# Patient Record
Sex: FEMALE | Race: WHITE | NOT HISPANIC OR LATINO
[De-identification: names, ages, dates, MRNs, and addresses within clinical notes are randomized per-mention and may not be internally consistent; named-entity substitution may affect disease eponyms.]

---

## 2017-05-09 ENCOUNTER — APPOINTMENT (OUTPATIENT)
Age: 52
End: 2017-05-09

## 2017-05-09 VITALS
BODY MASS INDEX: 25.21 KG/M2 | SYSTOLIC BLOOD PRESSURE: 117 MMHG | RESPIRATION RATE: 17 BRPM | HEART RATE: 67 BPM | DIASTOLIC BLOOD PRESSURE: 82 MMHG | HEIGHT: 62 IN | TEMPERATURE: 97.9 F | WEIGHT: 137 LBS

## 2017-05-11 LAB
AFP-TM SERPL-MCNC: 1 NG/ML
ALBUMIN SERPL ELPH-MCNC: 3.8 G/DL
ALP BLD-CCNC: 46 U/L
ALT SERPL-CCNC: 17 U/L
ANION GAP SERPL CALC-SCNC: 14 MMOL/L
AST SERPL-CCNC: 20 U/L
BASOPHILS # BLD AUTO: 0.04 K/UL
BASOPHILS NFR BLD AUTO: 0.7 %
BILIRUB SERPL-MCNC: 0.3 MG/DL
BUN SERPL-MCNC: 13 MG/DL
CALCIUM SERPL-MCNC: 9.4 MG/DL
CHLORIDE SERPL-SCNC: 101 MMOL/L
CO2 SERPL-SCNC: 24 MMOL/L
CREAT SERPL-MCNC: 0.82 MG/DL
EOSINOPHIL # BLD AUTO: 0.16 K/UL
EOSINOPHIL NFR BLD AUTO: 2.7 %
HBV SURFACE AB SERPL IA-ACNC: <3 MIU/ML
HCT VFR BLD CALC: 40.6 %
HGB BLD-MCNC: 13.7 G/DL
IMM GRANULOCYTES NFR BLD AUTO: 0.2 %
LYMPHOCYTES # BLD AUTO: 2.17 K/UL
LYMPHOCYTES NFR BLD AUTO: 36.9 %
MAN DIFF?: NORMAL
MCHC RBC-ENTMCNC: 30.7 PG
MCHC RBC-ENTMCNC: 33.7 GM/DL
MCV RBC AUTO: 91 FL
MONOCYTES # BLD AUTO: 0.49 K/UL
MONOCYTES NFR BLD AUTO: 8.3 %
NEUTROPHILS # BLD AUTO: 3.01 K/UL
NEUTROPHILS NFR BLD AUTO: 51.2 %
PLATELET # BLD AUTO: 155 K/UL
POTASSIUM SERPL-SCNC: 4 MMOL/L
PROT SERPL-MCNC: 6.8 G/DL
RBC # BLD: 4.46 M/UL
RBC # FLD: 13.6 %
SODIUM SERPL-SCNC: 139 MMOL/L
WBC # FLD AUTO: 5.88 K/UL

## 2017-07-11 ENCOUNTER — APPOINTMENT (OUTPATIENT)
Dept: ULTRASOUND IMAGING | Facility: IMAGING CENTER | Age: 52
End: 2017-07-11

## 2017-07-11 ENCOUNTER — OUTPATIENT (OUTPATIENT)
Dept: OUTPATIENT SERVICES | Facility: HOSPITAL | Age: 52
LOS: 1 days | End: 2017-07-11
Payer: COMMERCIAL

## 2017-07-11 ENCOUNTER — APPOINTMENT (OUTPATIENT)
Age: 52
End: 2017-07-11

## 2017-07-11 DIAGNOSIS — K74.60 UNSPECIFIED CIRRHOSIS OF LIVER: ICD-10-CM

## 2017-07-11 PROCEDURE — 76700 US EXAM ABDOM COMPLETE: CPT

## 2017-07-14 ENCOUNTER — OUTPATIENT (OUTPATIENT)
Dept: OUTPATIENT SERVICES | Facility: HOSPITAL | Age: 52
LOS: 1 days | End: 2017-07-14
Payer: COMMERCIAL

## 2017-07-14 ENCOUNTER — APPOINTMENT (OUTPATIENT)
Age: 52
End: 2017-07-14

## 2017-07-14 DIAGNOSIS — B19.20 UNSPECIFIED VIRAL HEPATITIS C WITHOUT HEPATIC COMA: ICD-10-CM

## 2017-07-14 PROCEDURE — G0121: CPT

## 2017-07-14 PROCEDURE — 45378 DIAGNOSTIC COLONOSCOPY: CPT | Mod: GC

## 2017-08-08 ENCOUNTER — APPOINTMENT (OUTPATIENT)
Age: 52
End: 2017-08-08
Payer: COMMERCIAL

## 2017-08-08 PROCEDURE — 90471 IMMUNIZATION ADMIN: CPT

## 2017-08-08 PROCEDURE — 90740 HEPB VACC 3 DOSE IMMUNSUP IM: CPT

## 2017-09-12 ENCOUNTER — APPOINTMENT (OUTPATIENT)
Age: 52
End: 2017-09-12
Payer: COMMERCIAL

## 2017-09-12 PROCEDURE — 90740 HEPB VACC 3 DOSE IMMUNSUP IM: CPT

## 2017-09-12 PROCEDURE — 90471 IMMUNIZATION ADMIN: CPT

## 2017-11-10 ENCOUNTER — APPOINTMENT (OUTPATIENT)
Age: 52
End: 2017-11-10
Payer: COMMERCIAL

## 2017-11-10 VITALS
BODY MASS INDEX: 25.21 KG/M2 | RESPIRATION RATE: 18 BRPM | WEIGHT: 137 LBS | HEIGHT: 62 IN | DIASTOLIC BLOOD PRESSURE: 77 MMHG | HEART RATE: 65 BPM | TEMPERATURE: 97.7 F | SYSTOLIC BLOOD PRESSURE: 111 MMHG

## 2017-11-10 LAB
BASOPHILS # BLD AUTO: 0.04 K/UL
BASOPHILS NFR BLD AUTO: 0.7 %
EOSINOPHIL # BLD AUTO: 0.15 K/UL
EOSINOPHIL NFR BLD AUTO: 2.5 %
HCT VFR BLD CALC: 44.9 %
HGB BLD-MCNC: 14.6 G/DL
IMM GRANULOCYTES NFR BLD AUTO: 0.3 %
INR PPP: 1.04 RATIO
LYMPHOCYTES # BLD AUTO: 2.28 K/UL
LYMPHOCYTES NFR BLD AUTO: 38.7 %
MAN DIFF?: NORMAL
MCHC RBC-ENTMCNC: 30.3 PG
MCHC RBC-ENTMCNC: 32.5 GM/DL
MCV RBC AUTO: 93.2 FL
MONOCYTES # BLD AUTO: 0.48 K/UL
MONOCYTES NFR BLD AUTO: 8.1 %
NEUTROPHILS # BLD AUTO: 2.92 K/UL
NEUTROPHILS NFR BLD AUTO: 49.7 %
PLATELET # BLD AUTO: 173 K/UL
PT BLD: 11.8 SEC
RBC # BLD: 4.82 M/UL
RBC # FLD: 13.8 %
WBC # FLD AUTO: 5.89 K/UL

## 2017-11-10 PROCEDURE — 99213 OFFICE O/P EST LOW 20 MIN: CPT

## 2017-11-10 RX ORDER — MUPIROCIN 20 MG/G
2 OINTMENT TOPICAL
Qty: 22 | Refills: 0 | Status: COMPLETED | COMMUNITY
Start: 2017-07-07

## 2017-11-10 RX ORDER — SODIUM SULFATE, POTASSIUM SULFATE, MAGNESIUM SULFATE 17.5; 3.13; 1.6 G/ML; G/ML; G/ML
17.5-3.13-1.6 SOLUTION, CONCENTRATE ORAL
Qty: 1 | Refills: 0 | Status: DISCONTINUED | COMMUNITY
Start: 2017-05-09 | End: 2017-11-10

## 2017-11-13 LAB
AFP-TM SERPL-MCNC: 0.9 NG/ML
ALBUMIN SERPL ELPH-MCNC: 4.3 G/DL
ALP BLD-CCNC: 44 U/L
ALT SERPL-CCNC: 17 U/L
ANION GAP SERPL CALC-SCNC: 14 MMOL/L
AST SERPL-CCNC: 31 U/L
BILIRUB SERPL-MCNC: 0.5 MG/DL
BUN SERPL-MCNC: 14 MG/DL
CALCIUM SERPL-MCNC: 9.5 MG/DL
CHLORIDE SERPL-SCNC: 101 MMOL/L
CO2 SERPL-SCNC: 25 MMOL/L
CREAT SERPL-MCNC: 0.73 MG/DL
POTASSIUM SERPL-SCNC: 4.6 MMOL/L
PROT SERPL-MCNC: 7.4 G/DL
SODIUM SERPL-SCNC: 140 MMOL/L

## 2017-11-14 LAB
HCV RNA SERPL NAA DL=5-ACNC: NOT DETECTED
HCV RNA SERPL NAA+PROBE-LOG IU: NOT DETECTED LOGIU/ML

## 2017-11-22 ENCOUNTER — OUTPATIENT (OUTPATIENT)
Dept: OUTPATIENT SERVICES | Facility: HOSPITAL | Age: 52
LOS: 1 days | End: 2017-11-22
Payer: COMMERCIAL

## 2017-11-22 ENCOUNTER — APPOINTMENT (OUTPATIENT)
Dept: SURGERY | Facility: CLINIC | Age: 52
End: 2017-11-22
Payer: COMMERCIAL

## 2017-11-22 ENCOUNTER — APPOINTMENT (OUTPATIENT)
Dept: MRI IMAGING | Facility: IMAGING CENTER | Age: 52
End: 2017-11-22
Payer: COMMERCIAL

## 2017-11-22 DIAGNOSIS — Z00.8 ENCOUNTER FOR OTHER GENERAL EXAMINATION: ICD-10-CM

## 2017-11-22 PROCEDURE — 0159T: CPT | Mod: 26

## 2017-11-22 PROCEDURE — C8908: CPT

## 2017-11-22 PROCEDURE — A9585: CPT

## 2017-11-22 PROCEDURE — C8937: CPT

## 2017-11-22 PROCEDURE — 99205K: CUSTOM

## 2017-11-22 PROCEDURE — 77059 MRI BREAST BILATERAL: CPT | Mod: 26

## 2017-12-01 ENCOUNTER — RESULT REVIEW (OUTPATIENT)
Age: 52
End: 2017-12-01

## 2017-12-01 ENCOUNTER — OUTPATIENT (OUTPATIENT)
Dept: OUTPATIENT SERVICES | Facility: HOSPITAL | Age: 52
LOS: 1 days | End: 2017-12-01
Payer: COMMERCIAL

## 2017-12-01 ENCOUNTER — APPOINTMENT (OUTPATIENT)
Dept: CT IMAGING | Facility: IMAGING CENTER | Age: 52
End: 2017-12-01
Payer: COMMERCIAL

## 2017-12-01 DIAGNOSIS — Z00.8 ENCOUNTER FOR OTHER GENERAL EXAMINATION: ICD-10-CM

## 2017-12-01 PROCEDURE — 74174 CTA ABD&PLVS W/CONTRAST: CPT | Mod: 26

## 2017-12-01 PROCEDURE — 74174 CTA ABD&PLVS W/CONTRAST: CPT

## 2017-12-18 ENCOUNTER — OUTPATIENT (OUTPATIENT)
Dept: OUTPATIENT SERVICES | Facility: HOSPITAL | Age: 52
LOS: 1 days | End: 2017-12-18
Payer: COMMERCIAL

## 2017-12-18 VITALS
WEIGHT: 136.03 LBS | RESPIRATION RATE: 16 BRPM | HEIGHT: 62 IN | DIASTOLIC BLOOD PRESSURE: 76 MMHG | TEMPERATURE: 98 F | SYSTOLIC BLOOD PRESSURE: 108 MMHG | HEART RATE: 66 BPM

## 2017-12-18 DIAGNOSIS — Z01.818 ENCOUNTER FOR OTHER PREPROCEDURAL EXAMINATION: ICD-10-CM

## 2017-12-18 DIAGNOSIS — Z90.13 ACQUIRED ABSENCE OF BILATERAL BREASTS AND NIPPLES: ICD-10-CM

## 2017-12-18 DIAGNOSIS — C50.919 MALIGNANT NEOPLASM OF UNSPECIFIED SITE OF UNSPECIFIED FEMALE BREAST: Chronic | ICD-10-CM

## 2017-12-18 DIAGNOSIS — Z33.2 ENCOUNTER FOR ELECTIVE TERMINATION OF PREGNANCY: Chronic | ICD-10-CM

## 2017-12-18 DIAGNOSIS — O00.90 UNSPECIFIED ECTOPIC PREGNANCY WITHOUT INTRAUTERINE PREGNANCY: Chronic | ICD-10-CM

## 2017-12-18 DIAGNOSIS — C50.919 MALIGNANT NEOPLASM OF UNSPECIFIED SITE OF UNSPECIFIED FEMALE BREAST: ICD-10-CM

## 2017-12-18 DIAGNOSIS — M12.9 ARTHROPATHY, UNSPECIFIED: Chronic | ICD-10-CM

## 2017-12-18 LAB
ALBUMIN SERPL ELPH-MCNC: 4.1 G/DL — SIGNIFICANT CHANGE UP (ref 3.3–5)
ALBUMIN SERPL ELPH-MCNC: 4.1 G/DL — SIGNIFICANT CHANGE UP (ref 3.3–5)
ALP SERPL-CCNC: 46 U/L — SIGNIFICANT CHANGE UP (ref 40–120)
ALP SERPL-CCNC: 46 U/L — SIGNIFICANT CHANGE UP (ref 40–120)
ALT FLD-CCNC: 17 U/L — SIGNIFICANT CHANGE UP (ref 4–33)
ALT FLD-CCNC: 17 U/L — SIGNIFICANT CHANGE UP (ref 4–33)
AST SERPL-CCNC: 19 U/L — SIGNIFICANT CHANGE UP (ref 4–32)
AST SERPL-CCNC: 19 U/L — SIGNIFICANT CHANGE UP (ref 4–32)
BILIRUB DIRECT SERPL-MCNC: 0.1 MG/DL — SIGNIFICANT CHANGE UP (ref 0.1–0.2)
BILIRUB SERPL-MCNC: 0.5 MG/DL — SIGNIFICANT CHANGE UP (ref 0.2–1.2)
BILIRUB SERPL-MCNC: 0.5 MG/DL — SIGNIFICANT CHANGE UP (ref 0.2–1.2)
BLD GP AB SCN SERPL QL: NEGATIVE — SIGNIFICANT CHANGE UP
BUN SERPL-MCNC: 15 MG/DL — SIGNIFICANT CHANGE UP (ref 7–23)
CALCIUM SERPL-MCNC: 8.9 MG/DL — SIGNIFICANT CHANGE UP (ref 8.4–10.5)
CHLORIDE SERPL-SCNC: 105 MMOL/L — SIGNIFICANT CHANGE UP (ref 98–107)
CO2 SERPL-SCNC: 25 MMOL/L — SIGNIFICANT CHANGE UP (ref 22–31)
CREAT SERPL-MCNC: 0.79 MG/DL — SIGNIFICANT CHANGE UP (ref 0.5–1.3)
GLUCOSE SERPL-MCNC: 75 MG/DL — SIGNIFICANT CHANGE UP (ref 70–99)
HCT VFR BLD CALC: 44 % — SIGNIFICANT CHANGE UP (ref 34.5–45)
HGB BLD-MCNC: 13.9 G/DL — SIGNIFICANT CHANGE UP (ref 11.5–15.5)
MCHC RBC-ENTMCNC: 29.1 PG — SIGNIFICANT CHANGE UP (ref 27–34)
MCHC RBC-ENTMCNC: 31.6 % — LOW (ref 32–36)
MCV RBC AUTO: 92.1 FL — SIGNIFICANT CHANGE UP (ref 80–100)
NRBC # FLD: 0 — SIGNIFICANT CHANGE UP
PLATELET # BLD AUTO: 150 K/UL — SIGNIFICANT CHANGE UP (ref 150–400)
PMV BLD: 12.1 FL — SIGNIFICANT CHANGE UP (ref 7–13)
POTASSIUM SERPL-MCNC: 4.1 MMOL/L — SIGNIFICANT CHANGE UP (ref 3.5–5.3)
POTASSIUM SERPL-SCNC: 4.1 MMOL/L — SIGNIFICANT CHANGE UP (ref 3.5–5.3)
PROT SERPL-MCNC: 6.8 G/DL — SIGNIFICANT CHANGE UP (ref 6–8.3)
PROT SERPL-MCNC: 6.8 G/DL — SIGNIFICANT CHANGE UP (ref 6–8.3)
RBC # BLD: 4.78 M/UL — SIGNIFICANT CHANGE UP (ref 3.8–5.2)
RBC # FLD: 13.1 % — SIGNIFICANT CHANGE UP (ref 10.3–14.5)
RH IG SCN BLD-IMP: NEGATIVE — SIGNIFICANT CHANGE UP
SODIUM SERPL-SCNC: 142 MMOL/L — SIGNIFICANT CHANGE UP (ref 135–145)
WBC # BLD: 5.88 K/UL — SIGNIFICANT CHANGE UP (ref 3.8–10.5)
WBC # FLD AUTO: 5.88 K/UL — SIGNIFICANT CHANGE UP (ref 3.8–10.5)

## 2017-12-18 PROCEDURE — 93010 ELECTROCARDIOGRAM REPORT: CPT

## 2017-12-18 PROCEDURE — 71020: CPT | Mod: 26

## 2017-12-18 RX ORDER — SODIUM CHLORIDE 9 MG/ML
1000 INJECTION, SOLUTION INTRAVENOUS
Qty: 0 | Refills: 0 | Status: DISCONTINUED | OUTPATIENT
Start: 2017-12-26 | End: 2017-12-26

## 2017-12-18 NOTE — H&P PST ADULT - NSANTHOSAYNRD_GEN_A_CORE
No. ANDREE screening performed.  STOP BANG Legend: 0-2 = LOW Risk; 3-4 = INTERMEDIATE Risk; 5-8 = HIGH Risk

## 2017-12-18 NOTE — H&P PST ADULT - FAMILY HISTORY
Mother  Still living? Yes, Estimated age: Age Unknown  Family history of breast cancer in mother, Age at diagnosis: Age Unknown     Aunt  Still living? Yes, Estimated age: Age Unknown  Family history of breast cancer, Age at diagnosis: Age Unknown

## 2017-12-18 NOTE — H&P PST ADULT - PMH
Breast cancer  approx Nov/Dec. 2017 -- diagnosed with b/l breast cancer  Hepatitis C  diagnosed in 2001 -- treated with medication  Language barrier  Native language Tongan -- comprehends and verbalizes English well  Migraines

## 2017-12-18 NOTE — H&P PST ADULT - HISTORY OF PRESENT ILLNESS
This is a 51 y/o female whose native language is [place] Zealander; comprehends and verbalizes English well. Patient  presents  with h/o benign b/l breast biopsies approximately 2015. Recent mammography and sonogram revealed left breast mass and subsequent MRI revealed right breast mass. Subsequent b/l breast biopsies were positive for cancer. Scheduled forb/l nipple sparing simple mastectomies, sentinel lymph node biopsies, possible axillary dissections, b/l deep inferior epigastric  flap on 12-26-17

## 2017-12-18 NOTE — H&P PST ADULT - PROBLEM SELECTOR PLAN 1
This is a  51 y/o female who is scheduled for b/l nipple sparing simple mastectomies, sentinel lymph node biopsies, possible axillary dissections, b/l MAGALYS flap on 12-26-17  * Given scrub cleanser and pre op instructions

## 2017-12-18 NOTE — H&P PST ADULT - SOURCE OF INFORMATION, PROFILE
patient/cell [phone]; home [phone]; authorized , Mykel, and children, Yvette Gramajo Joshua to receive information

## 2017-12-18 NOTE — H&P PST ADULT - PSH
Arthropathy  left knee  Breast cancer  biopsy of b/l breasts approx Nov/Dec. 2017 -- diagnosed with b/l breast cancer  Ectopic pregnancy  1997  Termination of pregnancy (fetus)

## 2017-12-22 ENCOUNTER — APPOINTMENT (OUTPATIENT)
Dept: CT IMAGING | Facility: IMAGING CENTER | Age: 52
End: 2017-12-22
Payer: COMMERCIAL

## 2017-12-22 ENCOUNTER — OUTPATIENT (OUTPATIENT)
Dept: OUTPATIENT SERVICES | Facility: HOSPITAL | Age: 52
LOS: 1 days | End: 2017-12-22
Payer: COMMERCIAL

## 2017-12-22 DIAGNOSIS — O00.90 UNSPECIFIED ECTOPIC PREGNANCY WITHOUT INTRAUTERINE PREGNANCY: Chronic | ICD-10-CM

## 2017-12-22 DIAGNOSIS — C50.919 MALIGNANT NEOPLASM OF UNSPECIFIED SITE OF UNSPECIFIED FEMALE BREAST: Chronic | ICD-10-CM

## 2017-12-22 DIAGNOSIS — Z33.2 ENCOUNTER FOR ELECTIVE TERMINATION OF PREGNANCY: Chronic | ICD-10-CM

## 2017-12-22 DIAGNOSIS — Z00.8 ENCOUNTER FOR OTHER GENERAL EXAMINATION: ICD-10-CM

## 2017-12-22 DIAGNOSIS — M12.9 ARTHROPATHY, UNSPECIFIED: Chronic | ICD-10-CM

## 2017-12-22 PROCEDURE — 71250 CT THORAX DX C-: CPT

## 2017-12-22 PROCEDURE — 71250 CT THORAX DX C-: CPT | Mod: 26

## 2017-12-26 ENCOUNTER — TRANSCRIPTION ENCOUNTER (OUTPATIENT)
Age: 52
End: 2017-12-26

## 2017-12-26 ENCOUNTER — APPOINTMENT (OUTPATIENT)
Dept: SURGERY | Facility: HOSPITAL | Age: 52
End: 2017-12-26

## 2017-12-26 ENCOUNTER — RESULT REVIEW (OUTPATIENT)
Age: 52
End: 2017-12-26

## 2017-12-26 ENCOUNTER — APPOINTMENT (OUTPATIENT)
Dept: NUCLEAR MEDICINE | Facility: HOSPITAL | Age: 52
End: 2017-12-26

## 2017-12-26 ENCOUNTER — INPATIENT (INPATIENT)
Facility: HOSPITAL | Age: 52
LOS: 2 days | Discharge: HOME | End: 2017-12-29
Attending: PLASTIC SURGERY | Admitting: PLASTIC SURGERY
Payer: COMMERCIAL

## 2017-12-26 VITALS
DIASTOLIC BLOOD PRESSURE: 61 MMHG | HEIGHT: 62 IN | WEIGHT: 136.03 LBS | TEMPERATURE: 98 F | RESPIRATION RATE: 15 BRPM | SYSTOLIC BLOOD PRESSURE: 105 MMHG | OXYGEN SATURATION: 100 % | HEART RATE: 66 BPM

## 2017-12-26 DIAGNOSIS — C50.919 MALIGNANT NEOPLASM OF UNSPECIFIED SITE OF UNSPECIFIED FEMALE BREAST: Chronic | ICD-10-CM

## 2017-12-26 DIAGNOSIS — O00.90 UNSPECIFIED ECTOPIC PREGNANCY WITHOUT INTRAUTERINE PREGNANCY: Chronic | ICD-10-CM

## 2017-12-26 DIAGNOSIS — Z33.2 ENCOUNTER FOR ELECTIVE TERMINATION OF PREGNANCY: Chronic | ICD-10-CM

## 2017-12-26 DIAGNOSIS — M12.9 ARTHROPATHY, UNSPECIFIED: Chronic | ICD-10-CM

## 2017-12-26 DIAGNOSIS — Z90.13 ACQUIRED ABSENCE OF BILATERAL BREASTS AND NIPPLES: ICD-10-CM

## 2017-12-26 LAB
BASE EXCESS BLDA CALC-SCNC: -1.2 MMOL/L — SIGNIFICANT CHANGE UP
BASE EXCESS BLDA CALC-SCNC: -1.4 MMOL/L — SIGNIFICANT CHANGE UP
BASE EXCESS BLDA CALC-SCNC: -3.5 MMOL/L — SIGNIFICANT CHANGE UP
BUN SERPL-MCNC: 6 MG/DL — LOW (ref 7–23)
CA-I BLDA-SCNC: 1.12 MMOL/L — LOW (ref 1.15–1.29)
CA-I BLDA-SCNC: 1.12 MMOL/L — LOW (ref 1.15–1.29)
CA-I BLDA-SCNC: 1.15 MMOL/L — SIGNIFICANT CHANGE UP (ref 1.15–1.29)
CALCIUM SERPL-MCNC: 7.1 MG/DL — LOW (ref 8.4–10.5)
CHLORIDE SERPL-SCNC: 103 MMOL/L — SIGNIFICANT CHANGE UP (ref 98–107)
CO2 SERPL-SCNC: 20 MMOL/L — LOW (ref 22–31)
CREAT SERPL-MCNC: 0.41 MG/DL — LOW (ref 0.5–1.3)
GLUCOSE BLDA-MCNC: 108 MG/DL — HIGH (ref 70–99)
GLUCOSE BLDA-MCNC: 114 MG/DL — HIGH (ref 70–99)
GLUCOSE BLDA-MCNC: 162 MG/DL — HIGH (ref 70–99)
GLUCOSE SERPL-MCNC: 192 MG/DL — HIGH (ref 70–99)
HCG UR QL: NEGATIVE — SIGNIFICANT CHANGE UP
HCO3 BLDA-SCNC: 21 MMOL/L — LOW (ref 22–26)
HCO3 BLDA-SCNC: 23 MMOL/L — SIGNIFICANT CHANGE UP (ref 22–26)
HCO3 BLDA-SCNC: 24 MMOL/L — SIGNIFICANT CHANGE UP (ref 22–26)
HCT VFR BLD CALC: 26.5 % — LOW (ref 34.5–45)
HCT VFR BLD CALC: 30.9 % — LOW (ref 34.5–45)
HCT VFR BLDA CALC: 33.9 % — LOW (ref 34.5–46.5)
HCT VFR BLDA CALC: 34.4 % — LOW (ref 34.5–46.5)
HCT VFR BLDA CALC: 38.4 % — SIGNIFICANT CHANGE UP (ref 34.5–46.5)
HGB BLD-MCNC: 10 G/DL — LOW (ref 11.5–15.5)
HGB BLD-MCNC: 9 G/DL — LOW (ref 11.5–15.5)
HGB BLDA-MCNC: 11 G/DL — LOW (ref 11.5–15.5)
HGB BLDA-MCNC: 11.1 G/DL — LOW (ref 11.5–15.5)
HGB BLDA-MCNC: 12.5 G/DL — SIGNIFICANT CHANGE UP (ref 11.5–15.5)
MCHC RBC-ENTMCNC: 29.3 PG — SIGNIFICANT CHANGE UP (ref 27–34)
MCHC RBC-ENTMCNC: 31.5 PG — SIGNIFICANT CHANGE UP (ref 27–34)
MCHC RBC-ENTMCNC: 32.4 % — SIGNIFICANT CHANGE UP (ref 32–36)
MCHC RBC-ENTMCNC: 34 % — SIGNIFICANT CHANGE UP (ref 32–36)
MCV RBC AUTO: 90.6 FL — SIGNIFICANT CHANGE UP (ref 80–100)
MCV RBC AUTO: 92.7 FL — SIGNIFICANT CHANGE UP (ref 80–100)
NRBC # FLD: 0 — SIGNIFICANT CHANGE UP
NRBC # FLD: 0 — SIGNIFICANT CHANGE UP
PCO2 BLDA: 35 MMHG — SIGNIFICANT CHANGE UP (ref 32–48)
PCO2 BLDA: 40 MMHG — SIGNIFICANT CHANGE UP (ref 32–48)
PCO2 BLDA: 42 MMHG — SIGNIFICANT CHANGE UP (ref 32–48)
PH BLDA: 7.33 PH — LOW (ref 7.35–7.45)
PH BLDA: 7.38 PH — SIGNIFICANT CHANGE UP (ref 7.35–7.45)
PH BLDA: 7.43 PH — SIGNIFICANT CHANGE UP (ref 7.35–7.45)
PLATELET # BLD AUTO: 86 K/UL — LOW (ref 150–400)
PLATELET # BLD AUTO: 98 K/UL — LOW (ref 150–400)
PMV BLD: 11.3 FL — SIGNIFICANT CHANGE UP (ref 7–13)
PMV BLD: 11.6 FL — SIGNIFICANT CHANGE UP (ref 7–13)
PO2 BLDA: 263 MMHG — HIGH (ref 83–108)
PO2 BLDA: 274 MMHG — HIGH (ref 83–108)
PO2 BLDA: 499 MMHG — HIGH (ref 83–108)
POTASSIUM BLDA-SCNC: 3.2 MMOL/L — LOW (ref 3.4–4.5)
POTASSIUM BLDA-SCNC: 3.4 MMOL/L — SIGNIFICANT CHANGE UP (ref 3.4–4.5)
POTASSIUM BLDA-SCNC: 3.6 MMOL/L — SIGNIFICANT CHANGE UP (ref 3.4–4.5)
POTASSIUM SERPL-MCNC: 3.7 MMOL/L — SIGNIFICANT CHANGE UP (ref 3.5–5.3)
POTASSIUM SERPL-SCNC: 3.7 MMOL/L — SIGNIFICANT CHANGE UP (ref 3.5–5.3)
RBC # BLD: 2.86 M/UL — LOW (ref 3.8–5.2)
RBC # BLD: 3.41 M/UL — LOW (ref 3.8–5.2)
RBC # FLD: 13.1 % — SIGNIFICANT CHANGE UP (ref 10.3–14.5)
RBC # FLD: 13.2 % — SIGNIFICANT CHANGE UP (ref 10.3–14.5)
SAO2 % BLDA: 98.1 % — SIGNIFICANT CHANGE UP (ref 95–99)
SAO2 % BLDA: 99 % — SIGNIFICANT CHANGE UP (ref 95–99)
SAO2 % BLDA: 99.1 % — HIGH (ref 95–99)
SODIUM BLDA-SCNC: 131 MMOL/L — LOW (ref 136–146)
SODIUM BLDA-SCNC: 134 MMOL/L — LOW (ref 136–146)
SODIUM BLDA-SCNC: 135 MMOL/L — LOW (ref 136–146)
SODIUM SERPL-SCNC: 137 MMOL/L — SIGNIFICANT CHANGE UP (ref 135–145)
WBC # BLD: 6.69 K/UL — SIGNIFICANT CHANGE UP (ref 3.8–10.5)
WBC # BLD: 8.28 K/UL — SIGNIFICANT CHANGE UP (ref 3.8–10.5)
WBC # FLD AUTO: 6.69 K/UL — SIGNIFICANT CHANGE UP (ref 3.8–10.5)
WBC # FLD AUTO: 8.28 K/UL — SIGNIFICANT CHANGE UP (ref 3.8–10.5)

## 2017-12-26 PROCEDURE — 88331 PATH CONSLTJ SURG 1 BLK 1SPC: CPT | Mod: 26

## 2017-12-26 PROCEDURE — 88304 TISSUE EXAM BY PATHOLOGIST: CPT | Mod: 26

## 2017-12-26 PROCEDURE — 88307 TISSUE EXAM BY PATHOLOGIST: CPT | Mod: 26

## 2017-12-26 PROCEDURE — 19303K: CUSTOM | Mod: 50

## 2017-12-26 PROCEDURE — 38525K: CUSTOM | Mod: 50

## 2017-12-26 PROCEDURE — 88309 TISSUE EXAM BY PATHOLOGIST: CPT | Mod: 26

## 2017-12-26 PROCEDURE — 88305 TISSUE EXAM BY PATHOLOGIST: CPT | Mod: 26

## 2017-12-26 PROCEDURE — 38792K: CUSTOM | Mod: 50

## 2017-12-26 RX ORDER — KETOROLAC TROMETHAMINE 30 MG/ML
15 SYRINGE (ML) INJECTION EVERY 6 HOURS
Qty: 0 | Refills: 0 | Status: DISCONTINUED | OUTPATIENT
Start: 2017-12-26 | End: 2017-12-28

## 2017-12-26 RX ORDER — ONDANSETRON 8 MG/1
4 TABLET, FILM COATED ORAL EVERY 6 HOURS
Qty: 0 | Refills: 0 | Status: DISCONTINUED | OUTPATIENT
Start: 2017-12-26 | End: 2017-12-29

## 2017-12-26 RX ORDER — HYDROMORPHONE HYDROCHLORIDE 2 MG/ML
1 INJECTION INTRAMUSCULAR; INTRAVENOUS; SUBCUTANEOUS ONCE
Qty: 0 | Refills: 0 | Status: DISCONTINUED | OUTPATIENT
Start: 2017-12-26 | End: 2017-12-27

## 2017-12-26 RX ORDER — SODIUM CHLORIDE 9 MG/ML
1000 INJECTION, SOLUTION INTRAVENOUS
Qty: 0 | Refills: 0 | Status: DISCONTINUED | OUTPATIENT
Start: 2017-12-26 | End: 2017-12-28

## 2017-12-26 RX ORDER — ACETAMINOPHEN 500 MG
1000 TABLET ORAL ONCE
Qty: 0 | Refills: 0 | Status: COMPLETED | OUTPATIENT
Start: 2017-12-27 | End: 2017-12-27

## 2017-12-26 RX ORDER — FENTANYL CITRATE 50 UG/ML
25 INJECTION INTRAVENOUS
Qty: 0 | Refills: 0 | Status: DISCONTINUED | OUTPATIENT
Start: 2017-12-26 | End: 2017-12-27

## 2017-12-26 RX ORDER — ONDANSETRON 8 MG/1
4 TABLET, FILM COATED ORAL ONCE
Qty: 0 | Refills: 0 | Status: COMPLETED | OUTPATIENT
Start: 2017-12-26 | End: 2017-12-26

## 2017-12-26 RX ORDER — ALBUMIN HUMAN 25 %
250 VIAL (ML) INTRAVENOUS ONCE
Qty: 0 | Refills: 0 | Status: COMPLETED | OUTPATIENT
Start: 2017-12-26 | End: 2017-12-26

## 2017-12-26 RX ORDER — HYDROMORPHONE HYDROCHLORIDE 2 MG/ML
0.5 INJECTION INTRAMUSCULAR; INTRAVENOUS; SUBCUTANEOUS
Qty: 0 | Refills: 0 | Status: DISCONTINUED | OUTPATIENT
Start: 2017-12-26 | End: 2017-12-26

## 2017-12-26 RX ORDER — HEPARIN SODIUM 5000 [USP'U]/ML
5000 INJECTION INTRAVENOUS; SUBCUTANEOUS EVERY 12 HOURS
Qty: 0 | Refills: 0 | Status: DISCONTINUED | OUTPATIENT
Start: 2017-12-26 | End: 2017-12-29

## 2017-12-26 RX ORDER — DOCUSATE SODIUM 100 MG
100 CAPSULE ORAL THREE TIMES A DAY
Qty: 0 | Refills: 0 | Status: DISCONTINUED | OUTPATIENT
Start: 2017-12-26 | End: 2017-12-29

## 2017-12-26 RX ORDER — DEXAMETHASONE 0.5 MG/5ML
6 ELIXIR ORAL ONCE
Qty: 0 | Refills: 0 | Status: COMPLETED | OUTPATIENT
Start: 2017-12-26 | End: 2017-12-26

## 2017-12-26 RX ORDER — SODIUM CHLORIDE 9 MG/ML
1000 INJECTION, SOLUTION INTRAVENOUS ONCE
Qty: 0 | Refills: 0 | Status: COMPLETED | OUTPATIENT
Start: 2017-12-26 | End: 2017-12-27

## 2017-12-26 RX ORDER — HYDROMORPHONE HYDROCHLORIDE 2 MG/ML
0.5 INJECTION INTRAMUSCULAR; INTRAVENOUS; SUBCUTANEOUS
Qty: 0 | Refills: 0 | Status: DISCONTINUED | OUTPATIENT
Start: 2017-12-26 | End: 2017-12-27

## 2017-12-26 RX ORDER — OXYCODONE HYDROCHLORIDE 5 MG/1
5 TABLET ORAL EVERY 4 HOURS
Qty: 0 | Refills: 0 | Status: DISCONTINUED | OUTPATIENT
Start: 2017-12-26 | End: 2017-12-27

## 2017-12-26 RX ORDER — FENTANYL CITRATE 50 UG/ML
25 INJECTION INTRAVENOUS
Qty: 0 | Refills: 0 | Status: DISCONTINUED | OUTPATIENT
Start: 2017-12-26 | End: 2017-12-26

## 2017-12-26 RX ORDER — CEFAZOLIN SODIUM 1 G
2000 VIAL (EA) INJECTION EVERY 8 HOURS
Qty: 0 | Refills: 0 | Status: COMPLETED | OUTPATIENT
Start: 2017-12-26 | End: 2017-12-27

## 2017-12-26 RX ORDER — DIPHENHYDRAMINE HCL 50 MG
25 CAPSULE ORAL EVERY 4 HOURS
Qty: 0 | Refills: 0 | Status: DISCONTINUED | OUTPATIENT
Start: 2017-12-26 | End: 2017-12-29

## 2017-12-26 RX ORDER — ACETAMINOPHEN 500 MG
1000 TABLET ORAL ONCE
Qty: 0 | Refills: 0 | Status: COMPLETED | OUTPATIENT
Start: 2017-12-26 | End: 2017-12-26

## 2017-12-26 RX ORDER — OXYCODONE HYDROCHLORIDE 5 MG/1
5 TABLET ORAL EVERY 4 HOURS
Qty: 0 | Refills: 0 | Status: DISCONTINUED | OUTPATIENT
Start: 2017-12-26 | End: 2017-12-29

## 2017-12-26 RX ORDER — ONDANSETRON 8 MG/1
4 TABLET, FILM COATED ORAL ONCE
Qty: 0 | Refills: 0 | Status: DISCONTINUED | OUTPATIENT
Start: 2017-12-26 | End: 2017-12-26

## 2017-12-26 RX ADMIN — Medication 500 MILLILITER(S): at 23:10

## 2017-12-26 RX ADMIN — Medication 15 MILLIGRAM(S): at 23:53

## 2017-12-26 RX ADMIN — Medication 400 MILLIGRAM(S): at 21:55

## 2017-12-26 RX ADMIN — Medication 500 MILLILITER(S): at 22:35

## 2017-12-26 RX ADMIN — Medication 6 MILLIGRAM(S): at 21:50

## 2017-12-26 RX ADMIN — Medication 100 MILLIGRAM(S): at 21:54

## 2017-12-26 RX ADMIN — SODIUM CHLORIDE 125 MILLILITER(S): 9 INJECTION, SOLUTION INTRAVENOUS at 23:00

## 2017-12-26 RX ADMIN — ONDANSETRON 4 MILLIGRAM(S): 8 TABLET, FILM COATED ORAL at 20:40

## 2017-12-26 NOTE — BRIEF OPERATIVE NOTE - PRE-OP DX
Malignant neoplasm of lower-outer quadrant of left female breast, unspecified estrogen receptor status  12/26/2017    Active  Wilian Gordon

## 2017-12-26 NOTE — DISCHARGE NOTE ADULT - PATIENT PORTAL LINK FT
“You can access the [facility] Patient Portal, offered by [facility], by registering with the following website: [email or web address]”

## 2017-12-26 NOTE — DISCHARGE NOTE ADULT - MEDICATION SUMMARY - MEDICATIONS TO TAKE
I will START or STAY ON the medications listed below when I get home from the hospital:    calcium magnesium and vitamin D tablet once a day  -- Indication: For Home med    acetaminophen 325 mg oral tablet  -- 2 tab(s) by mouth every 6 hours -for mild pain  -- Indication: For pain    oxyCODONE-acetaminophen 5 mg-325 mg oral tablet  -- 1 tab(s) by mouth every 4 to 6 hours, As Needed -for severe pain MDD:6 tabs   -- Caution federal law prohibits the transfer of this drug to any person other  than the person for whom it was prescribed.  May cause drowsiness.  Alcohol may intensify this effect.  Use care when operating dangerous machinery.  This prescription cannot be refilled.  This product contains acetaminophen.  Do not use  with any other product containing acetaminophen to prevent possible liver damage.  Using more of this medication than prescribed may cause serious breathing problems.    -- Indication: For pain    Relpax 40 mg oral tablet  -- 1 tab(s) by mouth once a day, As Needed  -- Indication: For Home med    naproxen 375 mg oral delayed release tablet  -- 1 tab(s) by mouth 2 times a day, As Needed last dose 12/18  -- Indication: For Home med    Delsym 30 mg/5 mL oral suspension, extended release  -- 5 milliliter(s) by mouth every 12 hours, As Needed  -- Indication: For Home med    bisacodyl 10 mg rectal suppository  -- 1 suppository(ies) rectally once a day, As needed, Constipation  -- Indication: For constipation    docusate sodium 100 mg oral capsule  -- 1 cap(s) by mouth 3 times a day, As Needed - for constipation  -- Indication: For constipation    Senna 8.6 mg oral tablet  -- 1 tab(s) by mouth once a day (at bedtime), As Needed - for constipation  -- Indication: For constipation    Multiple Vitamins oral capsule  -- 1 cap(s) by mouth once a day last dose12/18  -- Indication: For Home med    Vitamin C 1000 mg oral tablet, chewable  -- orally once a day  -- Indication: For Home med

## 2017-12-26 NOTE — DISCHARGE NOTE ADULT - ADDITIONAL INSTRUCTIONS
Please follow up with Dr. Cisse within x1 week after discharge from the hospital. You may call [phone] to schedule an appointment.    Please follow up with Dr. Childs within 1-2 weeks after discharge from the hospital. You may call [phone] to schedule an appointment.

## 2017-12-26 NOTE — DISCHARGE NOTE ADULT - PLAN OF CARE
Post-operative Recovery - Return to your regular diet  - No heavy lifting more than 10 lbs and avoid strenuous activities  - Please keep drain sites and incisions clean and dry.  - Do not lift your operated arm(s) above shoulder height. This is to protect the vascular anastomosis (the ‘plumbing’ of your new breast).  - Do not push or pull yourself onto or off the bed with your operated arm(s). Instead use the roll technique to get in or out of bed.   - You may begin a walking program, but avoid breaking a sweat   - Avoid tub bathing until the sutures are well healed, usually three weeks.  - Empty and record drain output twice a day.  - Call 911 and return to the ED for chest pain, shortness of breath, significant increase in pain, or significant change in color of surgical sites. - Return to your regular diet  - No heavy lifting more than 10 lbs and avoid strenuous activities  - May shower.  - Please keep drain sites and incisions clean and dry when not showering.  - Do not lift your operated arm(s) above shoulder height. This is to protect the vascular anastomosis (the ‘plumbing’ of your new breast).  - Do not push or pull yourself onto or off the bed with your operated arm(s). Instead use the roll technique to get in or out of bed.   - You may begin a walking program, but avoid breaking a sweat   - Avoid tub bathing until the sutures are well healed, usually three weeks.  - Empty and record drain output twice a day.  - Call 911 and return to the ED for chest pain, shortness of breath, significant increase in pain, or significant change in color of surgical sites.

## 2017-12-26 NOTE — BRIEF OPERATIVE NOTE - SPECIMENS
R & L internal mammary lymph node
L breast, L retroareolar tissue, L sentinel node, R breast, R sentinel node

## 2017-12-26 NOTE — DISCHARGE NOTE ADULT - CONDITIONS AT DISCHARGE
vss,bilateral flap +ve cap refill ,ruth milking ,emptying and recapping showed to  , able to return demonstrate without trouble ,iv discontinued ,discharge instructions  given pt verbalize understanding it

## 2017-12-26 NOTE — BRIEF OPERATIVE NOTE - OPERATION/FINDINGS
b/l MAGALYS
B/L mastectomies, b/l SLNB, both negative on frozen section  PRS MAGALYS reconstruction to follow

## 2017-12-26 NOTE — ASU PATIENT PROFILE, ADULT - PMH
Breast cancer  approx Nov/Dec. 2017 -- diagnosed with b/l breast cancer  Hepatitis C  diagnosed in 2001 -- treated with medication  Language barrier  Native language Cook Islander -- comprehends and verbalizes English well  Migraines

## 2017-12-26 NOTE — DISCHARGE NOTE ADULT - HOSPITAL COURSE
53 y/o F underwent bilateral nipple-sparing mastectomies and bilateral deep inferior epigastric  flap breast reconstruction with Dr. Cisse and Dr. Childs in the OR. The patient tolerated the procedure well. 6 drains were placed: 2 in each breast and 2 in the abdomen. Postoperatively the patient was sent to the PACU. The patient remained in the PACU overnight. The patient's flaps were monitored by Vioptix and by clinical examination. On POD 1, the patient was hemodynamically stable; was transferred to a surgical floor; was advanced to a regular diet; was placed on her home medications; was out of bed to a chair and ambulating, and the patient's Maxwell catheter was removed. On POD 2, the Vioptix monitors were removed. During the patient's hospital course, the patient's pain was controlled by IV pain medications and then by PO pain medications.    At the time of discharge, the patient was hemodynamically stable, was tolerating PO diet, was voiding urine and passing stool, was ambulating, and was comfortable with adequate pain control. The patient was instructed to follow up with Dr. Cisse within x1 week after discharge from the hospital and Dr. Gallardo within x1-2 week after discharge from the hospital. The patient felt comfortable with discharge. The patient was discharged with a prescription for oral pain medication. The patient had no other issues. 53 y/o F underwent bilateral nipple-sparing mastectomies and bilateral deep inferior epigastric  flap breast reconstruction with Dr. Cisse and Dr. Childs in the OR. The patient tolerated the procedure well. 6 drains were placed: 2 in each breast and 2 in the abdomen. Postoperatively the patient was sent to the PACU. The patient remained in the PACU overnight. The patient's flaps were monitored by Vioptix and by clinical examination. On POD 1, the patient was hemodynamically stable; was transferred to a surgical floor; was advanced to a regular diet; was placed on her home medications; was out of bed to a chair and ambulating, and the patient's Maxwell catheter was removed. On POD 2, the Vioptix monitors were removed. During the patient's hospital course, the patient's pain was controlled by IV pain medications and then by PO pain medications.    At the time of discharge, the patient was hemodynamically stable, was tolerating PO diet, was voiding urine and passing stool, was ambulating, and was comfortable with adequate pain control. The patient was instructed to follow up with Dr. Cisse within x1 week after discharge from the hospital and Dr. Gallardo within x1-2 week after discharge from the hospital. The patient felt comfortable with discharge. The patient was provided a prescription of oral pain medication by the attending preoperatively. The patient had no other issues.

## 2017-12-26 NOTE — DISCHARGE NOTE ADULT - CARE PROVIDER_API CALL
Carlos A Cisse), Plastic Surgery  [address]  [address]  [place] [address]  Phone: [phone]  Fax: [phone]    Cathryn Childs), PLJ Breast Surgery  [address]  [address]  [place], [place] [id number]  Phone: [phone]  Fax: [phone]

## 2017-12-26 NOTE — DISCHARGE NOTE ADULT - CARE PLAN
Principal Discharge DX:	Breast cancer  Goal:	Post-operative Recovery  Instructions for follow-up, activity and diet:	- Return to your regular diet  - No heavy lifting more than 10 lbs and avoid strenuous activities  - Please keep drain sites and incisions clean and dry.  - Do not lift your operated arm(s) above shoulder height. This is to protect the vascular anastomosis (the ‘plumbing’ of your new breast).  - Do not push or pull yourself onto or off the bed with your operated arm(s). Instead use the roll technique to get in or out of bed.   - You may begin a walking program, but avoid breaking a sweat   - Avoid tub bathing until the sutures are well healed, usually three weeks.  - Empty and record drain output twice a day.  - Call 911 and return to the ED for chest pain, shortness of breath, significant increase in pain, or significant change in color of surgical sites. Principal Discharge DX:	Breast cancer  Goal:	Post-operative Recovery  Instructions for follow-up, activity and diet:	- Return to your regular diet  - No heavy lifting more than 10 lbs and avoid strenuous activities  - May shower.  - Please keep drain sites and incisions clean and dry when not showering.  - Do not lift your operated arm(s) above shoulder height. This is to protect the vascular anastomosis (the ‘plumbing’ of your new breast).  - Do not push or pull yourself onto or off the bed with your operated arm(s). Instead use the roll technique to get in or out of bed.   - You may begin a walking program, but avoid breaking a sweat   - Avoid tub bathing until the sutures are well healed, usually three weeks.  - Empty and record drain output twice a day.  - Call 911 and return to the ED for chest pain, shortness of breath, significant increase in pain, or significant change in color of surgical sites.

## 2017-12-26 NOTE — CHART NOTE - NSCHARTNOTEFT_GEN_A_CORE
Post Operative Check    Patient seen & examined at bedside  No n/v.  Pain is appropriately controlled.  Tolerating sips of clears.    Objective    Vital signs  T(F): , Max: 98.1 (12-26-17 @ 06:26)  HR: 67 (12-26-17 @ 19:30)  BP: 83/52 (12-26-17 @ 19:30)  SpO2: 100% (12-26-17 @ 19:30)    Output     12-26 @ 07:01  -  12-26 @ 19:44  --------------------------------------------------------  IN: 0 mL / OUT: 170 mL / NET: -170 mL    Gen NAD  Abd transverse midline incision c/d/i  SIOBHAN's SS  Breasts b/l breast incisions c/d/i with no overlying steri's  Signals R 58% L 75%   madrid secured, draining clear urine    Labs    12-26 @ 18:30    WBC 8.28  / Hct 30.9  / SCr --    52 year old female s/p bilateral mastectomies with DIEPS    - Fluid bolus for slight hypotension  - Monitor H&H  - Monitor I/O's  - Check doppler signals   - Primary care per plastics surgery    Kevin Montero  x [phone]

## 2017-12-27 LAB
BASOPHILS NFR SPEC: 0 % — SIGNIFICANT CHANGE UP (ref 0–2)
BUN SERPL-MCNC: 4 MG/DL — LOW (ref 7–23)
CALCIUM SERPL-MCNC: 7.8 MG/DL — LOW (ref 8.4–10.5)
CHLORIDE SERPL-SCNC: 108 MMOL/L — HIGH (ref 98–107)
CO2 SERPL-SCNC: 22 MMOL/L — SIGNIFICANT CHANGE UP (ref 22–31)
CREAT SERPL-MCNC: 0.5 MG/DL — SIGNIFICANT CHANGE UP (ref 0.5–1.3)
EOSINOPHIL NFR FLD: 0 % — SIGNIFICANT CHANGE UP (ref 0–6)
GIANT PLATELETS BLD QL SMEAR: PRESENT — SIGNIFICANT CHANGE UP
GLUCOSE SERPL-MCNC: 168 MG/DL — HIGH (ref 70–99)
HCT VFR BLD CALC: 26.7 % — LOW (ref 34.5–45)
HGB BLD-MCNC: 9.1 G/DL — LOW (ref 11.5–15.5)
LYMPHOCYTES NFR SPEC AUTO: 4.5 % — LOW (ref 13–44)
MCHC RBC-ENTMCNC: 31.1 PG — SIGNIFICANT CHANGE UP (ref 27–34)
MCHC RBC-ENTMCNC: 34.1 % — SIGNIFICANT CHANGE UP (ref 32–36)
MCV RBC AUTO: 91.1 FL — SIGNIFICANT CHANGE UP (ref 80–100)
MONOCYTES NFR BLD: 0.9 % — LOW (ref 2–9)
MORPHOLOGY BLD-IMP: NORMAL — SIGNIFICANT CHANGE UP
NEUTROPHIL AB SER-ACNC: 88.3 % — HIGH (ref 43–77)
NEUTS BAND # BLD: 6.3 % — HIGH (ref 0–6)
NRBC # FLD: 0 — SIGNIFICANT CHANGE UP
PLATELET # BLD AUTO: 80 K/UL — LOW (ref 150–400)
PLATELET COUNT - ESTIMATE: SIGNIFICANT CHANGE UP
PMV BLD: 12.1 FL — SIGNIFICANT CHANGE UP (ref 7–13)
POTASSIUM SERPL-MCNC: 3.8 MMOL/L — SIGNIFICANT CHANGE UP (ref 3.5–5.3)
POTASSIUM SERPL-SCNC: 3.8 MMOL/L — SIGNIFICANT CHANGE UP (ref 3.5–5.3)
RBC # BLD: 2.93 M/UL — LOW (ref 3.8–5.2)
RBC # FLD: 13.1 % — SIGNIFICANT CHANGE UP (ref 10.3–14.5)
SODIUM SERPL-SCNC: 142 MMOL/L — SIGNIFICANT CHANGE UP (ref 135–145)
WBC # BLD: 5.37 K/UL — SIGNIFICANT CHANGE UP (ref 3.8–10.5)
WBC # FLD AUTO: 5.37 K/UL — SIGNIFICANT CHANGE UP (ref 3.8–10.5)

## 2017-12-27 RX ORDER — ACETAMINOPHEN 500 MG
1000 TABLET ORAL ONCE
Qty: 0 | Refills: 0 | Status: COMPLETED | OUTPATIENT
Start: 2017-12-27 | End: 2017-12-27

## 2017-12-27 RX ORDER — ACETAMINOPHEN 500 MG
650 TABLET ORAL EVERY 6 HOURS
Qty: 0 | Refills: 0 | Status: DISCONTINUED | OUTPATIENT
Start: 2017-12-27 | End: 2017-12-27

## 2017-12-27 RX ORDER — ACETAMINOPHEN 500 MG
650 TABLET ORAL EVERY 6 HOURS
Qty: 0 | Refills: 0 | Status: DISCONTINUED | OUTPATIENT
Start: 2017-12-28 | End: 2017-12-29

## 2017-12-27 RX ORDER — ACETAMINOPHEN 500 MG
1000 TABLET ORAL ONCE
Qty: 0 | Refills: 0 | Status: COMPLETED | OUTPATIENT
Start: 2017-12-28 | End: 2017-12-28

## 2017-12-27 RX ORDER — BENZOCAINE AND MENTHOL 5; 1 G/100ML; G/100ML
1 LIQUID ORAL
Qty: 0 | Refills: 0 | Status: DISCONTINUED | OUTPATIENT
Start: 2017-12-27 | End: 2017-12-29

## 2017-12-27 RX ADMIN — HEPARIN SODIUM 5000 UNIT(S): 5000 INJECTION INTRAVENOUS; SUBCUTANEOUS at 19:44

## 2017-12-27 RX ADMIN — Medication 100 MILLIGRAM(S): at 05:18

## 2017-12-27 RX ADMIN — Medication 1000 MILLIGRAM(S): at 05:05

## 2017-12-27 RX ADMIN — Medication 15 MILLIGRAM(S): at 12:24

## 2017-12-27 RX ADMIN — BENZOCAINE AND MENTHOL 1 LOZENGE: 5; 1 LIQUID ORAL at 22:18

## 2017-12-27 RX ADMIN — SODIUM CHLORIDE 1000 MILLILITER(S): 9 INJECTION, SOLUTION INTRAVENOUS at 00:53

## 2017-12-27 RX ADMIN — Medication 100 MILLIGRAM(S): at 21:28

## 2017-12-27 RX ADMIN — Medication 400 MILLIGRAM(S): at 22:18

## 2017-12-27 RX ADMIN — Medication 15 MILLIGRAM(S): at 05:18

## 2017-12-27 RX ADMIN — Medication 1000 MILLIGRAM(S): at 22:48

## 2017-12-27 RX ADMIN — HEPARIN SODIUM 5000 UNIT(S): 5000 INJECTION INTRAVENOUS; SUBCUTANEOUS at 05:19

## 2017-12-27 RX ADMIN — Medication 400 MILLIGRAM(S): at 10:52

## 2017-12-27 RX ADMIN — Medication 100 MILLIGRAM(S): at 12:23

## 2017-12-27 RX ADMIN — Medication 400 MILLIGRAM(S): at 16:20

## 2017-12-27 RX ADMIN — Medication 15 MILLIGRAM(S): at 06:11

## 2017-12-27 RX ADMIN — Medication 15 MILLIGRAM(S): at 19:44

## 2017-12-27 RX ADMIN — SODIUM CHLORIDE 75 MILLILITER(S): 9 INJECTION, SOLUTION INTRAVENOUS at 12:24

## 2017-12-27 RX ADMIN — Medication 400 MILLIGRAM(S): at 04:21

## 2017-12-27 NOTE — PROGRESS NOTE ADULT - SUBJECTIVE AND OBJECTIVE BOX
GENERAL SURGERY DAILY PROGRESS NOTE:     Subjective:  Pt seen and examined in PACU. Feeling well. Pain controlled. Has not been OOB yet.  Says her blood pressure normally runs 90s systolic.            Objective:    MEDICATIONS  (STANDING):  acetaminophen  IVPB. 1000 milliGRAM(s) IV Intermittent once  docusate sodium 100 milliGRAM(s) Oral three times a day  heparin  Injectable 5000 Unit(s) SubCutaneous every 12 hours  ketorolac   Injectable 15 milliGRAM(s) IV Push every 6 hours  lactated ringers. 1000 milliLiter(s) (125 mL/Hr) IV Continuous <Continuous>    MEDICATIONS  (PRN):  diphenhydrAMINE   Capsule 25 milliGRAM(s) Oral every 4 hours PRN Rash and/or Itching  fentaNYL    Injectable 25 MICROGram(s) IV Push every 5 minutes PRN Mild Pain (1 - 3)  HYDROmorphone  Injectable 1 milliGRAM(s) IV Push once PRN Severe Pain (7 - 10)  HYDROmorphone  Injectable 0.5 milliGRAM(s) IV Push every 10 minutes PRN Moderate Pain (4 - 6)  ondansetron Injectable 4 milliGRAM(s) IV Push every 6 hours PRN Nausea and/or Vomiting  oxyCODONE    IR 5 milliGRAM(s) Oral every 4 hours PRN For moderate pain  oxyCODONE    IR 5 milliGRAM(s) Oral every 4 hours PRN breakthrough pain      Vital Signs Last 24 Hrs  T(C): 37.1 (27 Dec 2017 04:00), Max: 37.1 (27 Dec 2017 04:00)  T(F): 98.8 (27 Dec 2017 04:00), Max: 98.8 (27 Dec 2017 04:00)  HR: 93 (27 Dec 2017 07:00) (65 - 106)  BP: 94/52 (27 Dec 2017 07:00) (80/44 - 105/61)  BP(mean): --  RR: 20 (27 Dec 2017 07:00) (14 - 22)  SpO2: 100% (27 Dec 2017 07:00) (100% - 100%)    NAD, awake and alert  Respirations nonlabored  CV regular  B/l MAGALYS flaps soft, pink, viable  B/l axillae soft, w/o hematoma  SIOBHAN drains serosanguinous  Abdomen soft, nontender, nondistended  No guarding or rebound tenderness  Low transverse incision c/d/i  Extremities warm      I&O's Detail    26 Dec 2017 07:01  -  27 Dec 2017 07:00  --------------------------------------------------------  IN:    IV PiggyBack: 650 mL    Lactated Ringers IV Bolus: 1000 mL    lactated ringers.: 1625 mL  Total IN: 3275 mL    OUT:    Bulb: 31 mL    Bulb: 26 mL    Bulb: 68 mL    Bulb: 107 mL    Bulb: 55.5 mL    Bulb: 70.5 mL    Indwelling Catheter - Urethral: 3220 mL  Total OUT: 3578 mL    Total NET: -303 mL          Daily     Daily     LABS:                        9.1    5.37  )-----------( 80       ( 27 Dec 2017 04:00 )             26.7     12-27    142  |  108<H>  |  4<L>  ----------------------------<  168<H>  3.8   |  22  |  0.50    Ca    7.8<L>      27 Dec 2017 04:00            RADIOLOGY & ADDITIONAL STUDIES:

## 2017-12-27 NOTE — PROGRESS NOTE ADULT - SUBJECTIVE AND OBJECTIVE BOX
NAD  Afebrile, BP - 90/50, HR - 85, good u.o.  ViOptix 55%, 75%  Bilateral breast free flap viable, closures intact.  Abdomen flat, closure intact.  Drains serosang.  Hct - 26.7 (stable)

## 2017-12-27 NOTE — PATIENT PROFILE ADULT. - PMH
Breast cancer  approx Nov/Dec. 2017 -- diagnosed with b/l breast cancer  Hepatitis C  diagnosed in 2001 -- treated with medication  Language barrier  Native language [place] Islander -- comprehends and verbalizes English well  Migraines

## 2017-12-27 NOTE — PATIENT PROFILE ADULT. - SOURCE OF INFORMATION, PROFILE
patient/cell [phone]; home [phone]; authorized , Mykel, and children, Yvette Gramajo Joshua to receive information

## 2017-12-27 NOTE — PROGRESS NOTE ADULT - ASSESSMENT
Stable POD 1  Transfer to floor  Advance diet  OOB to chair, ambulate later as tolerated  Continue ViOptix

## 2017-12-27 NOTE — PROGRESS NOTE ADULT - ASSESSMENT
52F POD#1 s/p b/l mastectomies w/ MAGALYS reconstruction  - pain contol  - advance diet as tolerated  - OOB/ PT/ Incentive spiromtery  - care per PRS    B Evan MCGUIRE  [id number]

## 2017-12-27 NOTE — CHART NOTE - NSCHARTNOTEFT_GEN_A_CORE
Called overnight for low SBP to 70s. Patient received 500 albumin ordered by anesthesia. Improvement to 80s on a-line an cuff pressures. Repeat CBC ordered by anesthesia in PACU H/H 10/30 to 9/26.5. Called with request for blood transfusion.     Patient seen and examined by me at bedside. Patient is alert and awake, no complaints of feeling dizzy or lightheaded.  SBP in 80s.     Vital Signs Last 24 Hrs  T(C): 36.4 (27 Dec 2017 00:00), Max: 36.7 (26 Dec 2017 06:26)  T(F): 97.6 (27 Dec 2017 00:00), Max: 98.1 (26 Dec 2017 06:26)  HR: 82 (27 Dec 2017 00:15) (65 - 106)  BP: 85/44 (27 Dec 2017 00:15) (80/44 - 105/61)  BP(mean): --  RR: 15 (27 Dec 2017 00:15) (14 - 22)  SpO2: 100% (27 Dec 2017 00:15) (100% - 100%)    I&O's Detail    26 Dec 2017 07:01  -  27 Dec 2017 00:53  --------------------------------------------------------  IN:    IV PiggyBack: 500 mL    lactated ringers.: 750 mL  Total IN: 1250 mL    OUT:    Bulb: 32.5 mL    Bulb: 15 mL    Bulb: 37.5 mL    Bulb: 40 mL    Bulb: 15 mL    Bulb: 50 mL    Indwelling Catheter - Urethral: 655 mL  Total OUT: 845 mL    Total NET: 405 mL    Right and left breast flaps are viable. Pink skin paddles without congestions. Soft, warm.   Abdomen soft.  No signs of bleeding or hematoma.      Patient presentation d/q Dr. Cisse and Dr. Preston.  Patient appears clinically stable despite low SBP. Baseline in low 100s pre-op.   No concern for bleeding at this time. HR is stable and UOP is adequate.  Will hold off on blood transfusion for now.   -1L LR bolus  -repeat CBC 0400  -continue current management

## 2017-12-28 ENCOUNTER — RESULT REVIEW (OUTPATIENT)
Age: 52
End: 2017-12-28

## 2017-12-28 LAB — SURGICAL PATHOLOGY STUDY: SIGNIFICANT CHANGE UP

## 2017-12-28 RX ORDER — KETOROLAC TROMETHAMINE 30 MG/ML
10 SYRINGE (ML) INJECTION EVERY 6 HOURS
Qty: 0 | Refills: 0 | Status: DISCONTINUED | OUTPATIENT
Start: 2017-12-28 | End: 2017-12-29

## 2017-12-28 RX ORDER — ACETAMINOPHEN 500 MG
2 TABLET ORAL
Qty: 0 | Refills: 0 | COMMUNITY
Start: 2017-12-28

## 2017-12-28 RX ORDER — DOCUSATE SODIUM 100 MG
1 CAPSULE ORAL
Qty: 0 | Refills: 0 | COMMUNITY
Start: 2017-12-28

## 2017-12-28 RX ADMIN — Medication 10 MILLIGRAM(S): at 21:30

## 2017-12-28 RX ADMIN — Medication 10 MILLIGRAM(S): at 20:51

## 2017-12-28 RX ADMIN — Medication 400 MILLIGRAM(S): at 04:32

## 2017-12-28 RX ADMIN — HEPARIN SODIUM 5000 UNIT(S): 5000 INJECTION INTRAVENOUS; SUBCUTANEOUS at 17:27

## 2017-12-28 RX ADMIN — Medication 650 MILLIGRAM(S): at 17:26

## 2017-12-28 RX ADMIN — Medication 10 MILLIGRAM(S): at 14:31

## 2017-12-28 RX ADMIN — Medication 15 MILLIGRAM(S): at 01:51

## 2017-12-28 RX ADMIN — Medication 10 MILLIGRAM(S): at 08:51

## 2017-12-28 RX ADMIN — HEPARIN SODIUM 5000 UNIT(S): 5000 INJECTION INTRAVENOUS; SUBCUTANEOUS at 06:14

## 2017-12-28 RX ADMIN — Medication 100 MILLIGRAM(S): at 06:15

## 2017-12-28 RX ADMIN — Medication 650 MILLIGRAM(S): at 10:54

## 2017-12-28 RX ADMIN — Medication 100 MILLIGRAM(S): at 23:21

## 2017-12-28 RX ADMIN — Medication 100 MILLIGRAM(S): at 13:39

## 2017-12-28 RX ADMIN — Medication 650 MILLIGRAM(S): at 23:21

## 2017-12-28 NOTE — PROGRESS NOTE ADULT - SUBJECTIVE AND OBJECTIVE BOX
Pt doing well this morning, no complaints. Has been ambulating and voiding. AVSS. Vioptix stable    On exam, bilateral breasts are soft, flaps viable and warm. Minimal ecchymosis noted inferiorly. Abdomen soft and flat. Incisions cdi. Drains ss    Plan  -Dc vioptix this morning  -Continue flap checks  -Continue drain care  -Ambulate  -Pain control  -Possible dc home tomorrow

## 2017-12-28 NOTE — PROGRESS NOTE ADULT - ASSESSMENT
52F POD#2 s/p b/l mastectomies w/ SLNB and b/l MAGALYS Reconstruction, recovering well  - pain control  - diet as tolerated  - ambulate  - incentive spirometry  - d/c planning per PRS    B Evan MCGUIRE  [phone]

## 2017-12-28 NOTE — PROGRESS NOTE ADULT - SUBJECTIVE AND OBJECTIVE BOX
GENERAL SURGERY DAILY PROGRESS NOTE:     Subjective:  Pt. seen and examined. Pain is "tolerable." Tolerating diet. +void +OOB            Objective:    MEDICATIONS  (STANDING):  acetaminophen   Tablet 650 milliGRAM(s) Oral every 6 hours  docusate sodium 100 milliGRAM(s) Oral three times a day  heparin  Injectable 5000 Unit(s) SubCutaneous every 12 hours  ketorolac 10 milliGRAM(s) Oral every 6 hours  sodium biphosphate Rectal Enema 1 Enema Rectal once    MEDICATIONS  (PRN):  benzocaine 15 mG/menthol 3.6 mG Lozenge 1 Lozenge Oral every 3 hours PRN Sore Throat  bisacodyl Suppository 10 milliGRAM(s) Rectal daily PRN Constipation  diphenhydrAMINE   Capsule 25 milliGRAM(s) Oral every 4 hours PRN Rash and/or Itching  ondansetron Injectable 4 milliGRAM(s) IV Push every 6 hours PRN Nausea and/or Vomiting  oxyCODONE    IR 5 milliGRAM(s) Oral every 4 hours PRN breakthrough pain      Vital Signs Last 24 Hrs  T(C): 36.7 (28 Dec 2017 09:01), Max: 37.6 (27 Dec 2017 17:34)  T(F): 98 (28 Dec 2017 09:01), Max: 99.7 (27 Dec 2017 17:34)  HR: 93 (28 Dec 2017 09:01) (77 - 100)  BP: 85/49 (28 Dec 2017 09:01) (84/43 - 94/59)  BP(mean): --  RR: 18 (28 Dec 2017 09:01) (16 - 18)  SpO2: 97% (28 Dec 2017 09:01) (97% - 100%)    NAD, awake and alert  Respirations nonlabored  B/l MAGALYS flaps soft, pink  L flap with some ecchymosis inferiorly  B/L axillae soft  JPs serosanguinous  Abdomen soft, nontender, nondistended  No guarding or rebound tenderness  Low transverse incision c/d/i  Extremities warm    I&O's Detail    27 Dec 2017 07:01  -  28 Dec 2017 07:00  --------------------------------------------------------  IN:    IV PiggyBack: 500 mL    lactated ringers.: 1825 mL    Oral Fluid: 1260 mL  Total IN: 3585 mL    OUT:    Bulb: 37.5 mL    Bulb: 112.5 mL    Bulb: 100 mL    Bulb: 65 mL    Bulb: 75 mL    Bulb: 11.5 mL    Indwelling Catheter - Urethral: 1500 mL    Voided: 1600 mL  Total OUT: 3501.5 mL    Total NET: 83.5 mL      28 Dec 2017 07:01  -  28 Dec 2017 09:44  --------------------------------------------------------  IN:  Total IN: 0 mL    OUT:    Voided: 550 mL  Total OUT: 550 mL    Total NET: -550 mL          Daily     Daily     LABS:                        9.1    5.37  )-----------( 80       ( 27 Dec 2017 04:00 )             26.7     12-27    142  |  108<H>  |  4<L>  ----------------------------<  168<H>  3.8   |  22  |  0.50    Ca    7.8<L>      27 Dec 2017 04:00            RADIOLOGY & ADDITIONAL STUDIES:

## 2017-12-29 VITALS
SYSTOLIC BLOOD PRESSURE: 94 MMHG | TEMPERATURE: 98 F | OXYGEN SATURATION: 97 % | HEART RATE: 82 BPM | DIASTOLIC BLOOD PRESSURE: 57 MMHG | RESPIRATION RATE: 18 BRPM

## 2017-12-29 RX ORDER — BACITRACIN ZINC 500 UNIT/G
1 OINTMENT IN PACKET (EA) TOPICAL
Qty: 0 | Refills: 0 | Status: DISCONTINUED | OUTPATIENT
Start: 2017-12-29 | End: 2017-12-29

## 2017-12-29 RX ORDER — BACITRACIN ZINC 500 UNIT/G
1 OINTMENT IN PACKET (EA) TOPICAL
Qty: 0 | Refills: 0 | COMMUNITY
Start: 2017-12-29

## 2017-12-29 RX ADMIN — Medication 10 MILLIGRAM(S): at 03:25

## 2017-12-29 RX ADMIN — Medication 650 MILLIGRAM(S): at 05:44

## 2017-12-29 RX ADMIN — HEPARIN SODIUM 5000 UNIT(S): 5000 INJECTION INTRAVENOUS; SUBCUTANEOUS at 05:43

## 2017-12-29 RX ADMIN — Medication 10 MILLIGRAM(S): at 04:15

## 2017-12-29 RX ADMIN — Medication 100 MILLIGRAM(S): at 05:44

## 2017-12-29 RX ADMIN — Medication 650 MILLIGRAM(S): at 12:16

## 2017-12-29 RX ADMIN — Medication 10 MILLIGRAM(S): at 10:17

## 2017-12-29 RX ADMIN — Medication 10 MILLIGRAM(S): at 10:16

## 2017-12-29 NOTE — PROGRESS NOTE ADULT - SUBJECTIVE AND OBJECTIVE BOX
GENERAL SURGERY DAILY PROGRESS NOTE:     Subjective:  Pt seen and examined. Feels well. Eating well. Has not had BM. Discussed use of OTC stool softeners prn if requiring narcotics. +OOB. Pain controlled.        Objective:    MEDICATIONS  (STANDING):  acetaminophen   Tablet 650 milliGRAM(s) Oral every 6 hours  BACItracin   Ointment 1 Application(s) Topical two times a day  docusate sodium 100 milliGRAM(s) Oral three times a day  heparin  Injectable 5000 Unit(s) SubCutaneous every 12 hours  ketorolac 10 milliGRAM(s) Oral every 6 hours  sodium biphosphate Rectal Enema 1 Enema Rectal once    MEDICATIONS  (PRN):  benzocaine 15 mG/menthol 3.6 mG Lozenge 1 Lozenge Oral every 3 hours PRN Sore Throat  bisacodyl Suppository 10 milliGRAM(s) Rectal daily PRN Constipation  diphenhydrAMINE   Capsule 25 milliGRAM(s) Oral every 4 hours PRN Rash and/or Itching  ondansetron Injectable 4 milliGRAM(s) IV Push every 6 hours PRN Nausea and/or Vomiting  oxyCODONE    IR 5 milliGRAM(s) Oral every 4 hours PRN breakthrough pain      Vital Signs Last 24 Hrs  T(C): 36.7 (29 Dec 2017 06:07), Max: 37.5 (28 Dec 2017 21:08)  T(F): 98 (29 Dec 2017 06:07), Max: 99.5 (28 Dec 2017 21:08)  HR: 87 (29 Dec 2017 06:07) (83 - 93)  BP: 94/68 (29 Dec 2017 06:07) (85/49 - 103/65)  BP(mean): --  RR: 18 (29 Dec 2017 06:07) (16 - 18)  SpO2: 98% (29 Dec 2017 06:07) (95% - 100%)    NAD, awake and alert  Respirations nonlabored  B/l MAGALYS flaps soft, viable  Ecchymosis on L flap  Axillae soft  JPs serosanguinous  Abdomen soft, nontender, nondistended  No guarding or rebound tenderness   Incision clean and dry    I&O's Detail    28 Dec 2017 07:01  -  29 Dec 2017 07:00  --------------------------------------------------------  IN:  Total IN: 0 mL    OUT:    Bulb: 25 mL    Bulb: 33 mL    Bulb: 107 mL    Bulb: 77.5 mL    Bulb: 108 mL    Bulb: 57.5 mL    Voided: 4850 mL  Total OUT: 5258 mL    Total NET: -5258 mL          Daily     Daily     LABS:                RADIOLOGY & ADDITIONAL STUDIES:

## 2017-12-29 NOTE — PROGRESS NOTE ADULT - SUBJECTIVE AND OBJECTIVE BOX
NAD  Afebrile, VSS  Bilateral breast free flap viable, closures intact. Areas of ecchymosis on native breast skin bilaterally.  Abdomen flat, closure intact.  Drains serosang.

## 2017-12-29 NOTE — PROGRESS NOTE ADULT - ASSESSMENT
52F s/p b/l mastectomy w/ MAGALYS Recon  - pain control  - stool softeners prn  - stable for d/c from Gen Surg standpoint  - f/u w/ Dr. Childs outpatient    B Evan MCGUIRE  [id number]

## 2017-12-29 NOTE — PROGRESS NOTE ADULT - ASSESSMENT
Stable POD 3  Flaps viable  Ready for discharge with drains  Instructions given  May shower  Bacitracin to areas of skin ecchymosis  Follow-up next week

## 2018-01-03 ENCOUNTER — APPOINTMENT (OUTPATIENT)
Dept: SURGERY | Facility: CLINIC | Age: 53
End: 2018-01-03
Payer: COMMERCIAL

## 2018-01-03 PROCEDURE — 99024 POSTOP FOLLOW-UP VISIT: CPT

## 2018-01-12 ENCOUNTER — OUTPATIENT (OUTPATIENT)
Dept: OUTPATIENT SERVICES | Facility: HOSPITAL | Age: 53
LOS: 1 days | Discharge: HOME | End: 2018-01-12

## 2018-01-12 DIAGNOSIS — O00.90 UNSPECIFIED ECTOPIC PREGNANCY WITHOUT INTRAUTERINE PREGNANCY: Chronic | ICD-10-CM

## 2018-01-12 DIAGNOSIS — C50.919 MALIGNANT NEOPLASM OF UNSPECIFIED SITE OF UNSPECIFIED FEMALE BREAST: ICD-10-CM

## 2018-01-12 DIAGNOSIS — C50.919 MALIGNANT NEOPLASM OF UNSPECIFIED SITE OF UNSPECIFIED FEMALE BREAST: Chronic | ICD-10-CM

## 2018-01-12 DIAGNOSIS — M12.9 ARTHROPATHY, UNSPECIFIED: Chronic | ICD-10-CM

## 2018-01-12 DIAGNOSIS — Z33.2 ENCOUNTER FOR ELECTIVE TERMINATION OF PREGNANCY: Chronic | ICD-10-CM

## 2018-01-17 ENCOUNTER — RESULT REVIEW (OUTPATIENT)
Age: 53
End: 2018-01-17

## 2018-01-17 ENCOUNTER — APPOINTMENT (OUTPATIENT)
Dept: HEMATOLOGY ONCOLOGY | Facility: CLINIC | Age: 53
End: 2018-01-17
Payer: COMMERCIAL

## 2018-01-17 VITALS
DIASTOLIC BLOOD PRESSURE: 72 MMHG | WEIGHT: 133.82 LBS | BODY MASS INDEX: 24.32 KG/M2 | TEMPERATURE: 98 F | SYSTOLIC BLOOD PRESSURE: 106 MMHG | RESPIRATION RATE: 16 BRPM | OXYGEN SATURATION: 99 % | HEART RATE: 69 BPM | HEIGHT: 62.4 IN

## 2018-01-17 DIAGNOSIS — Z80.41 FAMILY HISTORY OF MALIGNANT NEOPLASM OF OVARY: ICD-10-CM

## 2018-01-17 DIAGNOSIS — Z80.3 FAMILY HISTORY OF MALIGNANT NEOPLASM OF BREAST: ICD-10-CM

## 2018-01-17 DIAGNOSIS — Z83.2 FAMILY HISTORY OF DISEASES OF THE BLOOD AND BLOOD-FORMING ORGANS AND CERTAIN DISORDERS INVOLVING THE IMMUNE MECHANISM: ICD-10-CM

## 2018-01-17 DIAGNOSIS — Z86.69 PERSONAL HISTORY OF OTHER DISEASES OF THE NERVOUS SYSTEM AND SENSE ORGANS: ICD-10-CM

## 2018-01-17 LAB
BASOPHILS # BLD AUTO: 0 K/UL — SIGNIFICANT CHANGE UP (ref 0–0.2)
BASOPHILS NFR BLD AUTO: 0.5 % — SIGNIFICANT CHANGE UP (ref 0–2)
EOSINOPHIL # BLD AUTO: 0.1 K/UL — SIGNIFICANT CHANGE UP (ref 0–0.5)
EOSINOPHIL NFR BLD AUTO: 2.9 % — SIGNIFICANT CHANGE UP (ref 0–6)
HCT VFR BLD CALC: 35.7 % — SIGNIFICANT CHANGE UP (ref 34.5–45)
HGB BLD-MCNC: 11.8 G/DL — SIGNIFICANT CHANGE UP (ref 11.5–15.5)
LYMPHOCYTES # BLD AUTO: 1.3 K/UL — SIGNIFICANT CHANGE UP (ref 1–3.3)
LYMPHOCYTES # BLD AUTO: 27.1 % — SIGNIFICANT CHANGE UP (ref 13–44)
MCHC RBC-ENTMCNC: 30 PG — SIGNIFICANT CHANGE UP (ref 27–34)
MCHC RBC-ENTMCNC: 33 G/DL — SIGNIFICANT CHANGE UP (ref 32–36)
MCV RBC AUTO: 90.8 FL — SIGNIFICANT CHANGE UP (ref 80–100)
MONOCYTES # BLD AUTO: 0.6 K/UL — SIGNIFICANT CHANGE UP (ref 0–0.9)
MONOCYTES NFR BLD AUTO: 12.6 % — SIGNIFICANT CHANGE UP (ref 2–14)
NEUTROPHILS # BLD AUTO: 2.7 K/UL — SIGNIFICANT CHANGE UP (ref 1.8–7.4)
NEUTROPHILS NFR BLD AUTO: 57 % — SIGNIFICANT CHANGE UP (ref 43–77)
PLATELET # BLD AUTO: 174 K/UL — SIGNIFICANT CHANGE UP (ref 150–400)
RBC # BLD: 3.93 M/UL — SIGNIFICANT CHANGE UP (ref 3.8–5.2)
RBC # FLD: 12.5 % — SIGNIFICANT CHANGE UP (ref 10.3–14.5)
WBC # BLD: 4.8 K/UL — SIGNIFICANT CHANGE UP (ref 3.8–10.5)
WBC # FLD AUTO: 4.8 K/UL — SIGNIFICANT CHANGE UP (ref 3.8–10.5)

## 2018-01-17 PROCEDURE — 99205 OFFICE O/P NEW HI 60 MIN: CPT

## 2018-01-17 RX ORDER — ONDANSETRON 4 MG/1
4 TABLET ORAL
Qty: 10 | Refills: 0 | Status: DISCONTINUED | COMMUNITY
Start: 2017-12-20

## 2018-01-17 RX ORDER — TRAMADOL HYDROCHLORIDE 50 MG/1
50 TABLET, COATED ORAL
Qty: 30 | Refills: 0 | Status: DISCONTINUED | COMMUNITY
Start: 2017-12-20

## 2018-01-17 RX ORDER — SCOPOLAMINE 1.5 MG/1
1 PATCH, EXTENDED RELEASE TRANSDERMAL
Qty: 1 | Refills: 0 | Status: DISCONTINUED | COMMUNITY
Start: 2017-12-20

## 2018-01-30 LAB
25(OH)D3 SERPL-MCNC: 27.7 NG/ML
ALBUMIN SERPL ELPH-MCNC: 4 G/DL
ALP BLD-CCNC: 54 U/L
ALT SERPL-CCNC: 14 U/L
ANION GAP SERPL CALC-SCNC: 12 MMOL/L
AST SERPL-CCNC: 17 U/L
BILIRUB SERPL-MCNC: 0.5 MG/DL
BUN SERPL-MCNC: 12 MG/DL
CALCIUM SERPL-MCNC: 9.6 MG/DL
CHLORIDE SERPL-SCNC: 103 MMOL/L
CO2 SERPL-SCNC: 28 MMOL/L
CREAT SERPL-MCNC: 0.78 MG/DL
GLUCOSE SERPL-MCNC: 101 MG/DL
POTASSIUM SERPL-SCNC: 4.2 MMOL/L
PROT SERPL-MCNC: 6.7 G/DL
SODIUM SERPL-SCNC: 143 MMOL/L

## 2018-02-26 ENCOUNTER — OUTPATIENT (OUTPATIENT)
Dept: OUTPATIENT SERVICES | Facility: HOSPITAL | Age: 53
LOS: 1 days | End: 2018-02-26
Payer: COMMERCIAL

## 2018-02-26 ENCOUNTER — APPOINTMENT (OUTPATIENT)
Dept: ULTRASOUND IMAGING | Facility: IMAGING CENTER | Age: 53
End: 2018-02-26
Payer: COMMERCIAL

## 2018-02-26 DIAGNOSIS — B19.20 UNSPECIFIED VIRAL HEPATITIS C WITHOUT HEPATIC COMA: ICD-10-CM

## 2018-02-26 DIAGNOSIS — Z33.2 ENCOUNTER FOR ELECTIVE TERMINATION OF PREGNANCY: Chronic | ICD-10-CM

## 2018-02-26 DIAGNOSIS — O00.90 UNSPECIFIED ECTOPIC PREGNANCY WITHOUT INTRAUTERINE PREGNANCY: Chronic | ICD-10-CM

## 2018-02-26 DIAGNOSIS — C50.919 MALIGNANT NEOPLASM OF UNSPECIFIED SITE OF UNSPECIFIED FEMALE BREAST: Chronic | ICD-10-CM

## 2018-02-26 DIAGNOSIS — M12.9 ARTHROPATHY, UNSPECIFIED: Chronic | ICD-10-CM

## 2018-02-26 PROCEDURE — 76700 US EXAM ABDOM COMPLETE: CPT | Mod: 26

## 2018-02-26 PROCEDURE — 76700 US EXAM ABDOM COMPLETE: CPT

## 2018-04-12 ENCOUNTER — OUTPATIENT (OUTPATIENT)
Dept: OUTPATIENT SERVICES | Facility: HOSPITAL | Age: 53
LOS: 1 days | Discharge: HOME | End: 2018-04-12

## 2018-04-12 DIAGNOSIS — Z33.2 ENCOUNTER FOR ELECTIVE TERMINATION OF PREGNANCY: Chronic | ICD-10-CM

## 2018-04-12 DIAGNOSIS — M12.9 ARTHROPATHY, UNSPECIFIED: Chronic | ICD-10-CM

## 2018-04-12 DIAGNOSIS — O00.90 UNSPECIFIED ECTOPIC PREGNANCY WITHOUT INTRAUTERINE PREGNANCY: Chronic | ICD-10-CM

## 2018-04-12 DIAGNOSIS — C50.919 MALIGNANT NEOPLASM OF UNSPECIFIED SITE OF UNSPECIFIED FEMALE BREAST: ICD-10-CM

## 2018-04-12 DIAGNOSIS — C50.919 MALIGNANT NEOPLASM OF UNSPECIFIED SITE OF UNSPECIFIED FEMALE BREAST: Chronic | ICD-10-CM

## 2018-04-16 ENCOUNTER — RESULT REVIEW (OUTPATIENT)
Age: 53
End: 2018-04-16

## 2018-04-16 ENCOUNTER — APPOINTMENT (OUTPATIENT)
Dept: HEMATOLOGY ONCOLOGY | Facility: CLINIC | Age: 53
End: 2018-04-16
Payer: COMMERCIAL

## 2018-04-16 VITALS
WEIGHT: 135.8 LBS | BODY MASS INDEX: 24.52 KG/M2 | SYSTOLIC BLOOD PRESSURE: 100 MMHG | TEMPERATURE: 98.2 F | HEART RATE: 69 BPM | OXYGEN SATURATION: 99 % | DIASTOLIC BLOOD PRESSURE: 69 MMHG | RESPIRATION RATE: 16 BRPM

## 2018-04-16 LAB
BASOPHILS # BLD AUTO: 0 K/UL — SIGNIFICANT CHANGE UP (ref 0–0.2)
BASOPHILS NFR BLD AUTO: 0.7 % — SIGNIFICANT CHANGE UP (ref 0–2)
EOSINOPHIL # BLD AUTO: 0.1 K/UL — SIGNIFICANT CHANGE UP (ref 0–0.5)
EOSINOPHIL NFR BLD AUTO: 2.6 % — SIGNIFICANT CHANGE UP (ref 0–6)
HCT VFR BLD CALC: 39.6 % — SIGNIFICANT CHANGE UP (ref 34.5–45)
HGB BLD-MCNC: 12.8 G/DL — SIGNIFICANT CHANGE UP (ref 11.5–15.5)
LYMPHOCYTES # BLD AUTO: 1.5 K/UL — SIGNIFICANT CHANGE UP (ref 1–3.3)
LYMPHOCYTES # BLD AUTO: 31.6 % — SIGNIFICANT CHANGE UP (ref 13–44)
MCHC RBC-ENTMCNC: 27.2 PG — SIGNIFICANT CHANGE UP (ref 27–34)
MCHC RBC-ENTMCNC: 32.3 G/DL — SIGNIFICANT CHANGE UP (ref 32–36)
MCV RBC AUTO: 84 FL — SIGNIFICANT CHANGE UP (ref 80–100)
MONOCYTES # BLD AUTO: 0.4 K/UL — SIGNIFICANT CHANGE UP (ref 0–0.9)
MONOCYTES NFR BLD AUTO: 9.4 % — SIGNIFICANT CHANGE UP (ref 2–14)
NEUTROPHILS # BLD AUTO: 2.6 K/UL — SIGNIFICANT CHANGE UP (ref 1.8–7.4)
NEUTROPHILS NFR BLD AUTO: 55.7 % — SIGNIFICANT CHANGE UP (ref 43–77)
PLATELET # BLD AUTO: 136 K/UL — LOW (ref 150–400)
RBC # BLD: 4.71 M/UL — SIGNIFICANT CHANGE UP (ref 3.8–5.2)
RBC # FLD: 15.5 % — HIGH (ref 10.3–14.5)
WBC # BLD: 4.8 K/UL — SIGNIFICANT CHANGE UP (ref 3.8–10.5)
WBC # FLD AUTO: 4.8 K/UL — SIGNIFICANT CHANGE UP (ref 3.8–10.5)

## 2018-04-16 PROCEDURE — 99215 OFFICE O/P EST HI 40 MIN: CPT

## 2018-04-24 LAB
25(OH)D3 SERPL-MCNC: 31 NG/ML
ALBUMIN SERPL ELPH-MCNC: 3.9 G/DL
ALP BLD-CCNC: 38 U/L
ALT SERPL-CCNC: 16 U/L
ANION GAP SERPL CALC-SCNC: 8 MMOL/L
AST SERPL-CCNC: 17 U/L
BILIRUB SERPL-MCNC: 0.4 MG/DL
BUN SERPL-MCNC: 14 MG/DL
CALCIUM SERPL-MCNC: 8.8 MG/DL
CHLORIDE SERPL-SCNC: 107 MMOL/L
CO2 SERPL-SCNC: 25 MMOL/L
CREAT SERPL-MCNC: 0.84 MG/DL
GLUCOSE SERPL-MCNC: 96 MG/DL
POTASSIUM SERPL-SCNC: 4 MMOL/L
PROT SERPL-MCNC: 6.6 G/DL
SODIUM SERPL-SCNC: 140 MMOL/L

## 2018-05-04 ENCOUNTER — APPOINTMENT (OUTPATIENT)
Dept: HEPATOLOGY | Facility: CLINIC | Age: 53
End: 2018-05-04
Payer: COMMERCIAL

## 2018-05-04 VITALS
SYSTOLIC BLOOD PRESSURE: 105 MMHG | BODY MASS INDEX: 23.98 KG/M2 | HEIGHT: 62.4 IN | HEART RATE: 62 BPM | RESPIRATION RATE: 16 BRPM | WEIGHT: 132 LBS | TEMPERATURE: 98 F | OXYGEN SATURATION: 98 % | DIASTOLIC BLOOD PRESSURE: 71 MMHG

## 2018-05-04 PROCEDURE — 99213 OFFICE O/P EST LOW 20 MIN: CPT

## 2018-05-07 LAB — HBV SURFACE AB SERPL IA-ACNC: >1000 MIU/ML

## 2018-07-09 ENCOUNTER — OUTPATIENT (OUTPATIENT)
Dept: OUTPATIENT SERVICES | Facility: HOSPITAL | Age: 53
LOS: 1 days | End: 2018-07-09
Payer: COMMERCIAL

## 2018-07-09 VITALS
RESPIRATION RATE: 18 BRPM | OXYGEN SATURATION: 98 % | DIASTOLIC BLOOD PRESSURE: 70 MMHG | TEMPERATURE: 97 F | SYSTOLIC BLOOD PRESSURE: 124 MMHG | WEIGHT: 132.94 LBS | HEIGHT: 62 IN | HEART RATE: 72 BPM

## 2018-07-09 DIAGNOSIS — O00.90 UNSPECIFIED ECTOPIC PREGNANCY WITHOUT INTRAUTERINE PREGNANCY: Chronic | ICD-10-CM

## 2018-07-09 DIAGNOSIS — Z85.3 PERSONAL HISTORY OF MALIGNANT NEOPLASM OF BREAST: ICD-10-CM

## 2018-07-09 DIAGNOSIS — Z90.13 ACQUIRED ABSENCE OF BILATERAL BREASTS AND NIPPLES: Chronic | ICD-10-CM

## 2018-07-09 DIAGNOSIS — Z90.13 ACQUIRED ABSENCE OF BILATERAL BREASTS AND NIPPLES: ICD-10-CM

## 2018-07-09 DIAGNOSIS — Z33.2 ENCOUNTER FOR ELECTIVE TERMINATION OF PREGNANCY: Chronic | ICD-10-CM

## 2018-07-09 DIAGNOSIS — C50.919 MALIGNANT NEOPLASM OF UNSPECIFIED SITE OF UNSPECIFIED FEMALE BREAST: Chronic | ICD-10-CM

## 2018-07-09 DIAGNOSIS — M12.9 ARTHROPATHY, UNSPECIFIED: Chronic | ICD-10-CM

## 2018-07-09 DIAGNOSIS — Z01.818 ENCOUNTER FOR OTHER PREPROCEDURAL EXAMINATION: ICD-10-CM

## 2018-07-09 LAB
ALBUMIN SERPL ELPH-MCNC: 4.1 G/DL — SIGNIFICANT CHANGE UP (ref 3.3–5)
ALP SERPL-CCNC: 39 U/L — LOW (ref 40–120)
ALT FLD-CCNC: 23 U/L — SIGNIFICANT CHANGE UP (ref 10–45)
ANION GAP SERPL CALC-SCNC: 12 MMOL/L — SIGNIFICANT CHANGE UP (ref 5–17)
AST SERPL-CCNC: 21 U/L — SIGNIFICANT CHANGE UP (ref 10–40)
BILIRUB SERPL-MCNC: 0.5 MG/DL — SIGNIFICANT CHANGE UP (ref 0.2–1.2)
BUN SERPL-MCNC: 11 MG/DL — SIGNIFICANT CHANGE UP (ref 7–23)
CALCIUM SERPL-MCNC: 9.2 MG/DL — SIGNIFICANT CHANGE UP (ref 8.4–10.5)
CHLORIDE SERPL-SCNC: 104 MMOL/L — SIGNIFICANT CHANGE UP (ref 96–108)
CO2 SERPL-SCNC: 25 MMOL/L — SIGNIFICANT CHANGE UP (ref 22–31)
CREAT SERPL-MCNC: 0.78 MG/DL — SIGNIFICANT CHANGE UP (ref 0.5–1.3)
GLUCOSE SERPL-MCNC: 87 MG/DL — SIGNIFICANT CHANGE UP (ref 70–99)
HCT VFR BLD CALC: 40 % — SIGNIFICANT CHANGE UP (ref 34.5–45)
HGB BLD-MCNC: 13 G/DL — SIGNIFICANT CHANGE UP (ref 11.5–15.5)
MCHC RBC-ENTMCNC: 29.2 PG — SIGNIFICANT CHANGE UP (ref 27–34)
MCHC RBC-ENTMCNC: 32.5 GM/DL — SIGNIFICANT CHANGE UP (ref 32–36)
MCV RBC AUTO: 89.9 FL — SIGNIFICANT CHANGE UP (ref 80–100)
PLATELET # BLD AUTO: 144 K/UL — LOW (ref 150–400)
POTASSIUM SERPL-MCNC: 4 MMOL/L — SIGNIFICANT CHANGE UP (ref 3.5–5.3)
POTASSIUM SERPL-SCNC: 4 MMOL/L — SIGNIFICANT CHANGE UP (ref 3.5–5.3)
PROT SERPL-MCNC: 6.8 G/DL — SIGNIFICANT CHANGE UP (ref 6–8.3)
RBC # BLD: 4.45 M/UL — SIGNIFICANT CHANGE UP (ref 3.8–5.2)
RBC # FLD: 14.3 % — SIGNIFICANT CHANGE UP (ref 10.3–14.5)
SODIUM SERPL-SCNC: 141 MMOL/L — SIGNIFICANT CHANGE UP (ref 135–145)
WBC # BLD: 5.74 K/UL — SIGNIFICANT CHANGE UP (ref 3.8–10.5)
WBC # FLD AUTO: 5.74 K/UL — SIGNIFICANT CHANGE UP (ref 3.8–10.5)

## 2018-07-09 PROCEDURE — 85027 COMPLETE CBC AUTOMATED: CPT

## 2018-07-09 PROCEDURE — 80053 COMPREHEN METABOLIC PANEL: CPT

## 2018-07-09 PROCEDURE — G0463: CPT

## 2018-07-09 RX ORDER — SODIUM CHLORIDE 9 MG/ML
3 INJECTION INTRAMUSCULAR; INTRAVENOUS; SUBCUTANEOUS EVERY 8 HOURS
Qty: 0 | Refills: 0 | Status: DISCONTINUED | OUTPATIENT
Start: 2018-07-16 | End: 2018-07-30

## 2018-07-09 RX ORDER — SENNA PLUS 8.6 MG/1
1 TABLET ORAL
Qty: 0 | Refills: 0 | COMMUNITY

## 2018-07-09 RX ORDER — DEXTROMETHORPHAN POLISTIREX 30 MG/5 ML
5 SUSPENSION, EXTENDED RELEASE 12 HR ORAL
Qty: 0 | Refills: 0 | COMMUNITY

## 2018-07-09 RX ORDER — LIDOCAINE HCL 20 MG/ML
0.2 VIAL (ML) INJECTION ONCE
Qty: 0 | Refills: 0 | Status: DISCONTINUED | OUTPATIENT
Start: 2018-07-16 | End: 2018-07-30

## 2018-07-09 RX ORDER — APREPITANT 80 MG/1
40 CAPSULE ORAL ONCE
Qty: 0 | Refills: 0 | Status: COMPLETED | OUTPATIENT
Start: 2018-07-16 | End: 2018-07-16

## 2018-07-09 RX ORDER — ASCORBIC ACID 60 MG
0 TABLET,CHEWABLE ORAL
Qty: 0 | Refills: 0 | COMMUNITY

## 2018-07-09 NOTE — H&P PST ADULT - PMH
Breast cancer  approx Nov/Dec. 2017 -- diagnosed with b/l breast cancer  Hepatitis C  diagnosed in 2001 -- treated with medication  Language barrier  Native language Georgian -- comprehends and verbalizes English well  Migraines Breast cancer  approx Nov/Dec. 2017 -- diagnosed with b/l breast cancer  Hepatitis C  diagnosed in 2001 -- treated with medication  Migraines

## 2018-07-09 NOTE — H&P PST ADULT - HISTORY OF PRESENT ILLNESS
This is a 52 y/o female with history of breast cancer 2017, she presents today for breast reconstruction

## 2018-07-09 NOTE — H&P PST ADULT - ATTENDING COMMENTS
Patient is preop for bilateral breast revision reconstruction to include scar revision, skin excision, fat grafting and reduction of nipple size. Also plan abdominal and bilateral hip scar revision. Planned procedure and risks reviewed. Marking done. Consent signed.

## 2018-07-09 NOTE — H&P PST ADULT - PSH
Arthropathy  left knee  Breast cancer  biopsy of b/l breasts approx Nov/Dec. 2017 -- diagnosed with b/l breast cancer  Ectopic pregnancy  1997  Termination of pregnancy (fetus) Arthropathy  left knee  Breast cancer  biopsy of b/l breasts approx Nov/Dec. 2017 -- diagnosed with b/l breast cancer  Ectopic pregnancy  1997  S/P mastectomy, bilateral  with MAGALYS flap 2017  Termination of pregnancy (fetus)

## 2018-07-11 ENCOUNTER — APPOINTMENT (OUTPATIENT)
Dept: SURGERY | Facility: CLINIC | Age: 53
End: 2018-07-11
Payer: COMMERCIAL

## 2018-07-11 PROCEDURE — 99213K: CUSTOM

## 2018-07-15 ENCOUNTER — TRANSCRIPTION ENCOUNTER (OUTPATIENT)
Age: 53
End: 2018-07-15

## 2018-07-16 ENCOUNTER — RESULT REVIEW (OUTPATIENT)
Age: 53
End: 2018-07-16

## 2018-07-16 ENCOUNTER — OUTPATIENT (OUTPATIENT)
Dept: OUTPATIENT SERVICES | Facility: HOSPITAL | Age: 53
LOS: 1 days | End: 2018-07-16
Payer: COMMERCIAL

## 2018-07-16 VITALS
HEART RATE: 80 BPM | OXYGEN SATURATION: 98 % | SYSTOLIC BLOOD PRESSURE: 97 MMHG | DIASTOLIC BLOOD PRESSURE: 56 MMHG | RESPIRATION RATE: 16 BRPM

## 2018-07-16 VITALS
OXYGEN SATURATION: 98 % | HEIGHT: 62 IN | TEMPERATURE: 98 F | RESPIRATION RATE: 18 BRPM | HEART RATE: 72 BPM | WEIGHT: 132.94 LBS | DIASTOLIC BLOOD PRESSURE: 70 MMHG | SYSTOLIC BLOOD PRESSURE: 124 MMHG

## 2018-07-16 DIAGNOSIS — O00.90 UNSPECIFIED ECTOPIC PREGNANCY WITHOUT INTRAUTERINE PREGNANCY: Chronic | ICD-10-CM

## 2018-07-16 DIAGNOSIS — Z90.13 ACQUIRED ABSENCE OF BILATERAL BREASTS AND NIPPLES: Chronic | ICD-10-CM

## 2018-07-16 DIAGNOSIS — Z90.13 ACQUIRED ABSENCE OF BILATERAL BREASTS AND NIPPLES: ICD-10-CM

## 2018-07-16 DIAGNOSIS — Z85.3 PERSONAL HISTORY OF MALIGNANT NEOPLASM OF BREAST: ICD-10-CM

## 2018-07-16 DIAGNOSIS — M12.9 ARTHROPATHY, UNSPECIFIED: Chronic | ICD-10-CM

## 2018-07-16 DIAGNOSIS — Z33.2 ENCOUNTER FOR ELECTIVE TERMINATION OF PREGNANCY: Chronic | ICD-10-CM

## 2018-07-16 PROBLEM — Z78.9 OTHER SPECIFIED HEALTH STATUS: Chronic | Status: INACTIVE | Noted: 2017-12-18 | Resolved: 2018-07-09

## 2018-07-16 PROCEDURE — 14301 TIS TRNFR ANY 30.1-60 SQ CM: CPT

## 2018-07-16 PROCEDURE — 13101 CMPLX RPR TRUNK 2.6-7.5 CM: CPT | Mod: XS

## 2018-07-16 PROCEDURE — 20926: CPT

## 2018-07-16 PROCEDURE — 14000 TIS TRNFR TRUNK 10 SQ CM/<: CPT | Mod: XS

## 2018-07-16 PROCEDURE — 88304 TISSUE EXAM BY PATHOLOGIST: CPT | Mod: 26

## 2018-07-16 PROCEDURE — 88304 TISSUE EXAM BY PATHOLOGIST: CPT

## 2018-07-16 RX ORDER — CELECOXIB 200 MG/1
200 CAPSULE ORAL ONCE
Qty: 0 | Refills: 0 | Status: COMPLETED | OUTPATIENT
Start: 2018-07-16 | End: 2018-07-16

## 2018-07-16 RX ORDER — HYDROMORPHONE HYDROCHLORIDE 2 MG/ML
0.25 INJECTION INTRAMUSCULAR; INTRAVENOUS; SUBCUTANEOUS
Qty: 0 | Refills: 0 | Status: DISCONTINUED | OUTPATIENT
Start: 2018-07-16 | End: 2018-07-16

## 2018-07-16 RX ORDER — OXYCODONE HYDROCHLORIDE 5 MG/1
5 TABLET ORAL ONCE
Qty: 0 | Refills: 0 | Status: DISCONTINUED | OUTPATIENT
Start: 2018-07-16 | End: 2018-07-16

## 2018-07-16 RX ORDER — ACETAMINOPHEN 500 MG
1000 TABLET ORAL ONCE
Qty: 0 | Refills: 0 | Status: COMPLETED | OUTPATIENT
Start: 2018-07-16 | End: 2018-07-16

## 2018-07-16 RX ORDER — SODIUM CHLORIDE 9 MG/ML
1000 INJECTION, SOLUTION INTRAVENOUS
Qty: 0 | Refills: 0 | Status: DISCONTINUED | OUTPATIENT
Start: 2018-07-16 | End: 2018-07-30

## 2018-07-16 RX ORDER — CELECOXIB 200 MG/1
200 CAPSULE ORAL ONCE
Qty: 0 | Refills: 0 | Status: DISCONTINUED | OUTPATIENT
Start: 2018-07-16 | End: 2018-07-30

## 2018-07-16 RX ORDER — CEFAZOLIN SODIUM 1 G
2000 VIAL (EA) INJECTION ONCE
Qty: 0 | Refills: 0 | Status: COMPLETED | OUTPATIENT
Start: 2018-07-16 | End: 2018-07-16

## 2018-07-16 RX ORDER — ONDANSETRON 8 MG/1
4 TABLET, FILM COATED ORAL ONCE
Qty: 0 | Refills: 0 | Status: DISCONTINUED | OUTPATIENT
Start: 2018-07-16 | End: 2018-07-30

## 2018-07-16 RX ADMIN — APREPITANT 40 MILLIGRAM(S): 80 CAPSULE ORAL at 11:27

## 2018-07-16 NOTE — ASU DISCHARGE PLAN (ADULT/PEDIATRIC). - NOTIFY
Persistent Nausea and Vomiting/Fever greater than 101/Bleeding that does not stop/Swelling that continues/Pain not relieved by Medications/Inability to Tolerate Liquids or Foods

## 2018-07-16 NOTE — BRIEF OPERATIVE NOTE - OPERATION/FINDINGS
Revision of Abdominal Scar with skin flap advancement, Revision of Bilateral Hip Scars, Revision of bilateral Reconstructed Breasts, Liposuction of bilateral Thighs with Fat Grafting to Bilateral Breasts.

## 2018-07-16 NOTE — ASU DISCHARGE PLAN (ADULT/PEDIATRIC). - MEDICATION SUMMARY - MEDICATIONS TO TAKE
I will START or STAY ON the medications listed below when I get home from the hospital:    Relpax 40 mg oral tablet  -- 1 tab(s) by mouth once a day, As Needed  -- Indication: For History of malignant neoplasm of breast    Aspir 81 oral delayed release tablet  -- 1 tab(s) by mouth once a day (at bedtime)  continue ASA pre op  -- Indication: For History of malignant neoplasm of breast    tamoxifen 10 mg oral tablet  -- 1 tab(s) by mouth once a day  -- Indication: For History of malignant neoplasm of breast

## 2018-07-16 NOTE — ASU PATIENT PROFILE, ADULT - PMH
Breast cancer  approx Nov/Dec. 2017 -- diagnosed with b/l breast cancer  Hepatitis C  diagnosed in 2001 -- treated with medication  Migraines

## 2018-07-16 NOTE — BRIEF OPERATIVE NOTE - PROCEDURE
<<-----Click on this checkbox to enter Procedure Revision of reconstructed breast  07/16/2018  Bilateral Breasts  Active  VIRGEN  Fat grafting  07/16/2018  Fat grafting to bilateral Breasts  Active  VIRGEN Scar revision, 8.1 cm or more  07/16/2018  Revision of Abdominal Scar & Bilateral Hip Scars  Active  JSAMAS

## 2018-07-18 LAB — SURGICAL PATHOLOGY STUDY: SIGNIFICANT CHANGE UP

## 2018-07-28 NOTE — H&P PST ADULT - GENERAL
Benefits, risks, and possible complications of procedure explained to patient/caregiver who verbalized understanding and gave verbal consent. Benefits, risks, and possible complications of procedure explained to patient/caregiver who verbalized understanding and gave verbal consent. details…

## 2018-10-12 ENCOUNTER — OUTPATIENT (OUTPATIENT)
Dept: OUTPATIENT SERVICES | Facility: HOSPITAL | Age: 53
LOS: 1 days | Discharge: HOME | End: 2018-10-12

## 2018-10-12 DIAGNOSIS — Z33.2 ENCOUNTER FOR ELECTIVE TERMINATION OF PREGNANCY: Chronic | ICD-10-CM

## 2018-10-12 DIAGNOSIS — Z90.13 ACQUIRED ABSENCE OF BILATERAL BREASTS AND NIPPLES: Chronic | ICD-10-CM

## 2018-10-12 DIAGNOSIS — C50.919 MALIGNANT NEOPLASM OF UNSPECIFIED SITE OF UNSPECIFIED FEMALE BREAST: ICD-10-CM

## 2018-10-12 DIAGNOSIS — O00.90 UNSPECIFIED ECTOPIC PREGNANCY WITHOUT INTRAUTERINE PREGNANCY: Chronic | ICD-10-CM

## 2018-10-12 DIAGNOSIS — M12.9 ARTHROPATHY, UNSPECIFIED: Chronic | ICD-10-CM

## 2018-10-12 PROBLEM — G43.909 MIGRAINE, UNSPECIFIED, NOT INTRACTABLE, WITHOUT STATUS MIGRAINOSUS: Chronic | Status: ACTIVE | Noted: 2017-12-18

## 2018-10-12 PROBLEM — B19.20 UNSPECIFIED VIRAL HEPATITIS C WITHOUT HEPATIC COMA: Chronic | Status: ACTIVE | Noted: 2017-12-18

## 2018-10-22 ENCOUNTER — APPOINTMENT (OUTPATIENT)
Dept: HEMATOLOGY ONCOLOGY | Facility: CLINIC | Age: 53
End: 2018-10-22
Payer: COMMERCIAL

## 2018-10-22 ENCOUNTER — RESULT REVIEW (OUTPATIENT)
Age: 53
End: 2018-10-22

## 2018-10-22 ENCOUNTER — LABORATORY RESULT (OUTPATIENT)
Age: 53
End: 2018-10-22

## 2018-10-22 VITALS
HEART RATE: 67 BPM | OXYGEN SATURATION: 99 % | RESPIRATION RATE: 16 BRPM | TEMPERATURE: 98.5 F | SYSTOLIC BLOOD PRESSURE: 107 MMHG | WEIGHT: 131 LBS | DIASTOLIC BLOOD PRESSURE: 75 MMHG | BODY MASS INDEX: 23.65 KG/M2

## 2018-10-22 LAB
BASOPHILS # BLD AUTO: 0 K/UL — SIGNIFICANT CHANGE UP (ref 0–0.2)
BASOPHILS NFR BLD AUTO: 0.8 % — SIGNIFICANT CHANGE UP (ref 0–2)
EOSINOPHIL # BLD AUTO: 0.1 K/UL — SIGNIFICANT CHANGE UP (ref 0–0.5)
EOSINOPHIL NFR BLD AUTO: 2 % — SIGNIFICANT CHANGE UP (ref 0–6)
HCT VFR BLD CALC: 43.1 % — SIGNIFICANT CHANGE UP (ref 34.5–45)
HGB BLD-MCNC: 14.3 G/DL — SIGNIFICANT CHANGE UP (ref 11.5–15.5)
LYMPHOCYTES # BLD AUTO: 1.7 K/UL — SIGNIFICANT CHANGE UP (ref 1–3.3)
LYMPHOCYTES # BLD AUTO: 32.3 % — SIGNIFICANT CHANGE UP (ref 13–44)
MCHC RBC-ENTMCNC: 29.7 PG — SIGNIFICANT CHANGE UP (ref 27–34)
MCHC RBC-ENTMCNC: 33.3 G/DL — SIGNIFICANT CHANGE UP (ref 32–36)
MCV RBC AUTO: 89.3 FL — SIGNIFICANT CHANGE UP (ref 80–100)
MONOCYTES # BLD AUTO: 0.5 K/UL — SIGNIFICANT CHANGE UP (ref 0–0.9)
MONOCYTES NFR BLD AUTO: 8.8 % — SIGNIFICANT CHANGE UP (ref 2–14)
NEUTROPHILS # BLD AUTO: 3 K/UL — SIGNIFICANT CHANGE UP (ref 1.8–7.4)
NEUTROPHILS NFR BLD AUTO: 56.1 % — SIGNIFICANT CHANGE UP (ref 43–77)
PLATELET # BLD AUTO: 133 K/UL — LOW (ref 150–400)
RBC # BLD: 4.82 M/UL — SIGNIFICANT CHANGE UP (ref 3.8–5.2)
RBC # FLD: 12.5 % — SIGNIFICANT CHANGE UP (ref 10.3–14.5)
WBC # BLD: 5.3 K/UL — SIGNIFICANT CHANGE UP (ref 3.8–10.5)
WBC # FLD AUTO: 5.3 K/UL — SIGNIFICANT CHANGE UP (ref 3.8–10.5)

## 2018-10-22 PROCEDURE — 99215 OFFICE O/P EST HI 40 MIN: CPT

## 2018-10-25 LAB
24R-OH-CALCIDIOL SERPL-MCNC: 47.1 PG/ML
CANCER AG27-29 SERPL-ACNC: 13.9 U/ML
CEA SERPL-MCNC: 1.6 NG/ML

## 2018-11-01 ENCOUNTER — APPOINTMENT (OUTPATIENT)
Dept: HEPATOLOGY | Facility: CLINIC | Age: 53
End: 2018-11-01
Payer: COMMERCIAL

## 2018-11-01 VITALS
TEMPERATURE: 98.5 F | WEIGHT: 130 LBS | BODY MASS INDEX: 23.92 KG/M2 | RESPIRATION RATE: 14 BRPM | SYSTOLIC BLOOD PRESSURE: 110 MMHG | DIASTOLIC BLOOD PRESSURE: 74 MMHG | HEART RATE: 76 BPM | HEIGHT: 62 IN

## 2018-11-01 PROCEDURE — 99213 OFFICE O/P EST LOW 20 MIN: CPT

## 2018-11-07 ENCOUNTER — FORM ENCOUNTER (OUTPATIENT)
Age: 53
End: 2018-11-07

## 2018-11-08 ENCOUNTER — APPOINTMENT (OUTPATIENT)
Dept: ULTRASOUND IMAGING | Facility: IMAGING CENTER | Age: 53
End: 2018-11-08
Payer: COMMERCIAL

## 2018-11-08 ENCOUNTER — OUTPATIENT (OUTPATIENT)
Dept: OUTPATIENT SERVICES | Facility: HOSPITAL | Age: 53
LOS: 1 days | End: 2018-11-08
Payer: COMMERCIAL

## 2018-11-08 DIAGNOSIS — O00.90 UNSPECIFIED ECTOPIC PREGNANCY WITHOUT INTRAUTERINE PREGNANCY: Chronic | ICD-10-CM

## 2018-11-08 DIAGNOSIS — Z90.13 ACQUIRED ABSENCE OF BILATERAL BREASTS AND NIPPLES: Chronic | ICD-10-CM

## 2018-11-08 DIAGNOSIS — M12.9 ARTHROPATHY, UNSPECIFIED: Chronic | ICD-10-CM

## 2018-11-08 DIAGNOSIS — Z86.19 PERSONAL HISTORY OF OTHER INFECTIOUS AND PARASITIC DISEASES: ICD-10-CM

## 2018-11-08 DIAGNOSIS — Z33.2 ENCOUNTER FOR ELECTIVE TERMINATION OF PREGNANCY: Chronic | ICD-10-CM

## 2018-11-08 PROCEDURE — 76700 US EXAM ABDOM COMPLETE: CPT

## 2018-11-08 PROCEDURE — 76700 US EXAM ABDOM COMPLETE: CPT | Mod: 26

## 2018-12-24 NOTE — PRE-ANESTHESIA EVALUATION ADULT - NSANTHAGERD_ENT_A_CORE

## 2019-01-16 NOTE — ASU PATIENT PROFILE, ADULT - REASON FOR ADMISSION, PROFILE
[facility]:  Ambulatory Surgery [facility]... bilateral breast revision reconstruction

## 2019-03-15 ENCOUNTER — TRANSCRIPTION ENCOUNTER (OUTPATIENT)
Age: 54
End: 2019-03-15

## 2019-04-12 ENCOUNTER — EMERGENCY (EMERGENCY)
Facility: HOSPITAL | Age: 54
LOS: 1 days | Discharge: HOME | End: 2019-04-12
Attending: EMERGENCY MEDICINE | Admitting: EMERGENCY MEDICINE
Payer: COMMERCIAL

## 2019-04-12 VITALS
TEMPERATURE: 98 F | RESPIRATION RATE: 16 BRPM | HEART RATE: 72 BPM | OXYGEN SATURATION: 100 % | DIASTOLIC BLOOD PRESSURE: 97 MMHG | SYSTOLIC BLOOD PRESSURE: 146 MMHG

## 2019-04-12 VITALS
HEART RATE: 77 BPM | OXYGEN SATURATION: 99 % | TEMPERATURE: 99 F | DIASTOLIC BLOOD PRESSURE: 69 MMHG | RESPIRATION RATE: 14 BRPM | SYSTOLIC BLOOD PRESSURE: 110 MMHG

## 2019-04-12 DIAGNOSIS — O00.90 UNSPECIFIED ECTOPIC PREGNANCY WITHOUT INTRAUTERINE PREGNANCY: Chronic | ICD-10-CM

## 2019-04-12 DIAGNOSIS — Z90.13 ACQUIRED ABSENCE OF BILATERAL BREASTS AND NIPPLES: Chronic | ICD-10-CM

## 2019-04-12 DIAGNOSIS — M12.9 ARTHROPATHY, UNSPECIFIED: Chronic | ICD-10-CM

## 2019-04-12 DIAGNOSIS — Z33.2 ENCOUNTER FOR ELECTIVE TERMINATION OF PREGNANCY: Chronic | ICD-10-CM

## 2019-04-12 LAB
ALBUMIN SERPL ELPH-MCNC: 4.1 G/DL — SIGNIFICANT CHANGE UP (ref 3.3–5)
ALP SERPL-CCNC: 45 U/L — SIGNIFICANT CHANGE UP (ref 40–120)
ALT FLD-CCNC: 16 U/L — SIGNIFICANT CHANGE UP (ref 4–33)
ANION GAP SERPL CALC-SCNC: 10 MMO/L — SIGNIFICANT CHANGE UP (ref 7–14)
APTT BLD: 30.5 SEC — SIGNIFICANT CHANGE UP (ref 27.5–36.3)
AST SERPL-CCNC: 20 U/L — SIGNIFICANT CHANGE UP (ref 4–32)
BASOPHILS # BLD AUTO: 0.05 K/UL — SIGNIFICANT CHANGE UP (ref 0–0.2)
BASOPHILS NFR BLD AUTO: 1 % — SIGNIFICANT CHANGE UP (ref 0–2)
BILIRUB SERPL-MCNC: 0.5 MG/DL — SIGNIFICANT CHANGE UP (ref 0.2–1.2)
BUN SERPL-MCNC: 11 MG/DL — SIGNIFICANT CHANGE UP (ref 7–23)
CALCIUM SERPL-MCNC: 9.6 MG/DL — SIGNIFICANT CHANGE UP (ref 8.4–10.5)
CHLORIDE SERPL-SCNC: 108 MMOL/L — HIGH (ref 98–107)
CO2 SERPL-SCNC: 25 MMOL/L — SIGNIFICANT CHANGE UP (ref 22–31)
CREAT SERPL-MCNC: 0.72 MG/DL — SIGNIFICANT CHANGE UP (ref 0.5–1.3)
D DIMER BLD IA.RAPID-MCNC: < 150 NG/ML — SIGNIFICANT CHANGE UP
EOSINOPHIL # BLD AUTO: 0.08 K/UL — SIGNIFICANT CHANGE UP (ref 0–0.5)
EOSINOPHIL NFR BLD AUTO: 1.6 % — SIGNIFICANT CHANGE UP (ref 0–6)
GLUCOSE SERPL-MCNC: 99 MG/DL — SIGNIFICANT CHANGE UP (ref 70–99)
HCT VFR BLD CALC: 42.5 % — SIGNIFICANT CHANGE UP (ref 34.5–45)
HGB BLD-MCNC: 13.8 G/DL — SIGNIFICANT CHANGE UP (ref 11.5–15.5)
IMM GRANULOCYTES NFR BLD AUTO: 0.2 % — SIGNIFICANT CHANGE UP (ref 0–1.5)
INR BLD: 1.08 — SIGNIFICANT CHANGE UP (ref 0.88–1.17)
LYMPHOCYTES # BLD AUTO: 1.47 K/UL — SIGNIFICANT CHANGE UP (ref 1–3.3)
LYMPHOCYTES # BLD AUTO: 29.6 % — SIGNIFICANT CHANGE UP (ref 13–44)
MCHC RBC-ENTMCNC: 30.1 PG — SIGNIFICANT CHANGE UP (ref 27–34)
MCHC RBC-ENTMCNC: 32.5 % — SIGNIFICANT CHANGE UP (ref 32–36)
MCV RBC AUTO: 92.8 FL — SIGNIFICANT CHANGE UP (ref 80–100)
MONOCYTES # BLD AUTO: 0.5 K/UL — SIGNIFICANT CHANGE UP (ref 0–0.9)
MONOCYTES NFR BLD AUTO: 10.1 % — SIGNIFICANT CHANGE UP (ref 2–14)
NEUTROPHILS # BLD AUTO: 2.86 K/UL — SIGNIFICANT CHANGE UP (ref 1.8–7.4)
NEUTROPHILS NFR BLD AUTO: 57.5 % — SIGNIFICANT CHANGE UP (ref 43–77)
NRBC # FLD: 0 K/UL — SIGNIFICANT CHANGE UP (ref 0–0)
NT-PROBNP SERPL-SCNC: 157.8 PG/ML — SIGNIFICANT CHANGE UP
PLATELET # BLD AUTO: 125 K/UL — LOW (ref 150–400)
PMV BLD: 11.9 FL — SIGNIFICANT CHANGE UP (ref 7–13)
POTASSIUM SERPL-MCNC: 4.4 MMOL/L — SIGNIFICANT CHANGE UP (ref 3.5–5.3)
POTASSIUM SERPL-SCNC: 4.4 MMOL/L — SIGNIFICANT CHANGE UP (ref 3.5–5.3)
PROT SERPL-MCNC: 6.8 G/DL — SIGNIFICANT CHANGE UP (ref 6–8.3)
PROTHROM AB SERPL-ACNC: 12.4 SEC — SIGNIFICANT CHANGE UP (ref 9.8–13.1)
RBC # BLD: 4.58 M/UL — SIGNIFICANT CHANGE UP (ref 3.8–5.2)
RBC # FLD: 13.1 % — SIGNIFICANT CHANGE UP (ref 10.3–14.5)
SODIUM SERPL-SCNC: 143 MMOL/L — SIGNIFICANT CHANGE UP (ref 135–145)
TROPONIN T, HIGH SENSITIVITY: < 6 NG/L — SIGNIFICANT CHANGE UP (ref ?–14)
WBC # BLD: 4.97 K/UL — SIGNIFICANT CHANGE UP (ref 3.8–10.5)
WBC # FLD AUTO: 4.97 K/UL — SIGNIFICANT CHANGE UP (ref 3.8–10.5)

## 2019-04-12 PROCEDURE — 99283 EMERGENCY DEPT VISIT LOW MDM: CPT

## 2019-04-12 PROCEDURE — 71045 X-RAY EXAM CHEST 1 VIEW: CPT | Mod: 26

## 2019-04-12 RX ORDER — AZITHROMYCIN 500 MG/1
1 TABLET, FILM COATED ORAL
Qty: 5 | Refills: 0
Start: 2019-04-12 | End: 2019-04-16

## 2019-04-12 NOTE — ED PROVIDER NOTE - PSH
Arthropathy  left knee  Ectopic pregnancy  1997  S/P mastectomy, bilateral  with MAGALYS flap 2017  Termination of pregnancy (fetus)

## 2019-04-12 NOTE — ED PROVIDER NOTE - NS ED ROS FT
Constitutional: no fever and no chills  Eyes: no discharge, no irritation, no pain, no visual changes  ENMT: no ear pain or hearing loss, no dysphagia, (+) sore throat  Neck: no pain, no stiffness, no swollen glands  CV: no chest pain, no palpitations, no edema  Resp: (+) cough, (+) hemoptysis, no shortness of breath  Abd: no abdominal pain, no nausea or vomiting, no diarrhea  : no dysuria, no hematuria  MSK: no back pain, no neck pain, no joint pain  Neuro: no LOC, no gait abnormality, no headache, no sensory deficits, no weakness  Skin: no rashes, no lacerations, no lesions

## 2019-04-12 NOTE — ED PROVIDER NOTE - OBJECTIVE STATEMENT
52 y/o F PMH breast CA s/p b/l mastectomy and reconstruction on tamoxifen, Hep C w/ mild cirrhosis presenting with sore throat since last night and nonproductive cough this AM with moderate hemoptysis x3-4. Pt reporst blood mixed with sputum with each episode of coughing this morning. She denies any fevers/chill, diaphoresis, headache, neck pain, chest pain, SOB, palpitations, abd pain, n/v/d, hematuria, melena/hematochezia, bruising/bleeding, leg swelling or skin rash. Traveled to [place] beginning of March otherwise no other recent trave. No hx of blood clots, smoking, or recent surgery.

## 2019-04-12 NOTE — ED PROVIDER NOTE - CLINICAL SUMMARY MEDICAL DECISION MAKING FREE TEXT BOX
52 y/o F PMH breast CA s/p b/l mastectomy and reconstruction on tamoxifen, Hep C w/ mild cirrhosis presenting with sore throat, nonproductive cough and hemoptysis. Afebrile, vital signs stable. Benign physical exam. Centor Score 0. Most likely acute bronchitis, however given medical hx and estrogen modulating therapy also considering PE. Will obtain CBC, CMP, coags, troponin, BNP, CXR, d-dimer and reevaluate.

## 2019-04-12 NOTE — ED ADULT TRIAGE NOTE - CHIEF COMPLAINT QUOTE
Pt c/o sore throat x yesterday w cough today w blood tinged sputum. Pt denies sob, breathing even and unlabored.

## 2019-04-12 NOTE — ED PROVIDER NOTE - ATTENDING CONTRIBUTION TO CARE
The patient seen and examined  The patient presents with cough and mild hemoptysis.  No CP, No SOB    Lab including d-dimer and CXR WNL    Bronchitis    I, Phil Araujo, performed the initial face to face bedside interview with this patient regarding history of present illness, review of symptoms and relevant past medical, social and family history.  I completed an independent physical examination.  I was the initial provider who evaluated this patient. I have signed out the follow up of any pending tests (i.e. labs, radiological studies) to the resident.  I have communicated the patient’s plan of care and disposition with the resident..

## 2019-04-12 NOTE — ED PROVIDER NOTE - PHYSICAL EXAMINATION
PHYSICAL EXAM:  GENERAL: Sitting comfortable in bed, in no acute distress  HENMT: Atraumatic, moist mucous membranes, no oropharyngeal exudates or vesicles, uvula is midline  EYES: Clear bilaterally, PERRL, EOMs intact b/l  HEART: RRR, S1/S2, no murmur/gallops/rubs  RESPIRATORY: Clear to auscultation bilaterally, no wheezes/rhonchi/rales  ABDOMEN: +BS, soft, nontender, nondistended  EXTREMITIES: No lower extremity edema, +2 radial pulses b/l  NEURO:  A&Ox4, no focal motor deficits or sensory deficits   Heme/LYMPH: No ecchymosis or bruising, no anterior/posterior cervical or supraclavicular LAD  SKIN:  Skin normal color for race, warm, dry and intact. No evidence of rash.

## 2019-04-16 ENCOUNTER — OUTPATIENT (OUTPATIENT)
Dept: OUTPATIENT SERVICES | Facility: HOSPITAL | Age: 54
LOS: 1 days | Discharge: HOME | End: 2019-04-16

## 2019-04-16 DIAGNOSIS — Z90.13 ACQUIRED ABSENCE OF BILATERAL BREASTS AND NIPPLES: Chronic | ICD-10-CM

## 2019-04-16 DIAGNOSIS — C50.919 MALIGNANT NEOPLASM OF UNSPECIFIED SITE OF UNSPECIFIED FEMALE BREAST: ICD-10-CM

## 2019-04-16 DIAGNOSIS — M12.9 ARTHROPATHY, UNSPECIFIED: Chronic | ICD-10-CM

## 2019-04-16 DIAGNOSIS — Z33.2 ENCOUNTER FOR ELECTIVE TERMINATION OF PREGNANCY: Chronic | ICD-10-CM

## 2019-04-16 DIAGNOSIS — O00.90 UNSPECIFIED ECTOPIC PREGNANCY WITHOUT INTRAUTERINE PREGNANCY: Chronic | ICD-10-CM

## 2019-04-22 ENCOUNTER — APPOINTMENT (OUTPATIENT)
Dept: HEMATOLOGY ONCOLOGY | Facility: CLINIC | Age: 54
End: 2019-04-22
Payer: COMMERCIAL

## 2019-04-22 ENCOUNTER — RESULT REVIEW (OUTPATIENT)
Age: 54
End: 2019-04-22

## 2019-04-22 VITALS
RESPIRATION RATE: 15 BRPM | DIASTOLIC BLOOD PRESSURE: 79 MMHG | HEART RATE: 66 BPM | OXYGEN SATURATION: 97 % | BODY MASS INDEX: 25.16 KG/M2 | WEIGHT: 137.57 LBS | SYSTOLIC BLOOD PRESSURE: 112 MMHG | TEMPERATURE: 98.6 F

## 2019-04-22 LAB
BASOPHILS # BLD AUTO: 0 K/UL — SIGNIFICANT CHANGE UP (ref 0–0.2)
BASOPHILS NFR BLD AUTO: 0.5 % — SIGNIFICANT CHANGE UP (ref 0–2)
EOSINOPHIL # BLD AUTO: 0.1 K/UL — SIGNIFICANT CHANGE UP (ref 0–0.5)
EOSINOPHIL NFR BLD AUTO: 1.4 % — SIGNIFICANT CHANGE UP (ref 0–6)
HCT VFR BLD CALC: 37.9 % — SIGNIFICANT CHANGE UP (ref 34.5–45)
HGB BLD-MCNC: 13.5 G/DL — SIGNIFICANT CHANGE UP (ref 11.5–15.5)
LYMPHOCYTES # BLD AUTO: 1.7 K/UL — SIGNIFICANT CHANGE UP (ref 1–3.3)
LYMPHOCYTES # BLD AUTO: 30.5 % — SIGNIFICANT CHANGE UP (ref 13–44)
MCHC RBC-ENTMCNC: 32.4 PG — SIGNIFICANT CHANGE UP (ref 27–34)
MCHC RBC-ENTMCNC: 35.5 G/DL — SIGNIFICANT CHANGE UP (ref 32–36)
MCV RBC AUTO: 91.1 FL — SIGNIFICANT CHANGE UP (ref 80–100)
MONOCYTES # BLD AUTO: 0.5 K/UL — SIGNIFICANT CHANGE UP (ref 0–0.9)
MONOCYTES NFR BLD AUTO: 9.1 % — SIGNIFICANT CHANGE UP (ref 2–14)
NEUTROPHILS # BLD AUTO: 3.2 K/UL — SIGNIFICANT CHANGE UP (ref 1.8–7.4)
NEUTROPHILS NFR BLD AUTO: 58.5 % — SIGNIFICANT CHANGE UP (ref 43–77)
PLATELET # BLD AUTO: 127 K/UL — LOW (ref 150–400)
RBC # BLD: 4.16 M/UL — SIGNIFICANT CHANGE UP (ref 3.8–5.2)
RBC # FLD: 12.2 % — SIGNIFICANT CHANGE UP (ref 10.3–14.5)
WBC # BLD: 5.5 K/UL — SIGNIFICANT CHANGE UP (ref 3.8–10.5)
WBC # FLD AUTO: 5.5 K/UL — SIGNIFICANT CHANGE UP (ref 3.8–10.5)

## 2019-04-22 PROCEDURE — 99214 OFFICE O/P EST MOD 30 MIN: CPT

## 2019-04-22 NOTE — OB HISTORY
[Regular Cycle Intervals] : periods have been regular [Normal Amount/Duration] : was of a normal amount and duration [___] : Living: [unfilled]

## 2019-04-23 LAB
25(OH)D3 SERPL-MCNC: 36.5 NG/ML
ALBUMIN SERPL ELPH-MCNC: 4.1 G/DL
ALP BLD-CCNC: 43 U/L
ALT SERPL-CCNC: 17 U/L
ANION GAP SERPL CALC-SCNC: 9 MMOL/L
AST SERPL-CCNC: 20 U/L
BILIRUB SERPL-MCNC: 0.4 MG/DL
BUN SERPL-MCNC: 14 MG/DL
CALCIUM SERPL-MCNC: 9.7 MG/DL
CHLORIDE SERPL-SCNC: 105 MMOL/L
CO2 SERPL-SCNC: 27 MMOL/L
CREAT SERPL-MCNC: 0.72 MG/DL
ESTRADIOL SERPL-MCNC: 306 PG/ML
FSH SERPL-MCNC: 21.9 IU/L
GLUCOSE SERPL-MCNC: 89 MG/DL
POTASSIUM SERPL-SCNC: 4.5 MMOL/L
PROT SERPL-MCNC: 6.5 G/DL
SODIUM SERPL-SCNC: 141 MMOL/L

## 2019-04-25 NOTE — PHYSICAL EXAM
[Fully active, able to carry on all pre-disease performance without restriction] : Status 0 - Fully active, able to carry on all pre-disease performance without restriction [Normal] : grossly intact [de-identified] : healed MAGALYS flap. [de-identified] : b/ nipple sparing mastectomies with reconstruction. No CW or axillary nodules

## 2019-04-25 NOTE — ASSESSMENT
[Curative] : Goals of care discussed with patient: Curative [FreeTextEntry1] : This is a 53-year-old premenopausal lady who is diagnosed with stage IA (T1b N0) left breast well-differentiated invasive ductal carcinoma ER positive, [place] positive and HER-2/geraldo negative . She underwent bilateral nipple sparing mastectomies and is healing well. Oncotype DX score is 14 which predicts 9% risk of distant recurrence after 5 years of tamoxifen. Her medical h/o is significant for treated HCV with undetectable viral load. Started Ann  February 16,2018\par \par - Breast ca: SONAL. She is tolerating tamoxifen very well without significant side effects. Reports good compliance. Plan to continue tamoxifen for 10 years. Will switch to AI after menopause. Annual gyn and opthal f/u. No breast imaging needed. I will check CBC, CMP and vitamin D today. FSH and estradiol checked today- NOT IN MENOPAUSE.\par - Mild tolerable hot flashes- advised to wear layers and use fan as needed \par - Irregular periods: Periods are every other month now, but no additional spotting or bleeding. She will followup with her gynecologist every 6-12 months and report any unusual vaginal bleeding or spotting. Continue contraception with condoms. She will see second opinion GYn re endometrial bx\par -Hepatitis C Cirrhosis: Has mild thrombocytopenia 2/2 cirrhosis. Will follow up on LFTS. Check AFP today\par - I educated her about signs and symptoms of DVT/PE. Patient will report if she develops acute onset chest pain shortness of breath, leg swelling or calf pain. \par - Life style modifications, healthy diet and exercise d/w her\par \par RTC 6m.

## 2019-04-25 NOTE — REASON FOR VISIT
[Follow-Up Visit] : a follow-up [Spouse] : spouse [FreeTextEntry2] : Breast ca ER/WA POSITIVE, HER-2 NEGATIVE

## 2019-04-25 NOTE — HISTORY OF PRESENT ILLNESS
[Treatment Protocol] : Treatment Protocol [1 - Distress Level] : Distress Level: 1 [Patient given social work contact information and resource sheet] : Patient was given social work contact information and resource sheet [IA] : IA [de-identified] : Ms. TOMER GOMEZ is a 53 year old female here for an evaluation of breast cancer. Her oncologic history is as follows:\par \par She underwent a routine screening mammogram on 11/1/17 which showed an indeterminate 0.6 cm mass in the left breast. She underwent left breast biopsy on 11/15/17 which showed invasive well-differentiated ductal carcinoma, [place] score 4/9, with low-grade DCIS. ER/RI 90%, HER-2/geraldo negative.\par \par She underwent bilateral mastectomies and bilateral sentinel axillary lymph nodes on 12/26/17. Pathology from the right side showed radial scar and lymph nodes were negative. Pathology from the left side showed invasive ductal carcinoma, well-differentiated, 0.7 cm, with low-grade DCIS. Margins were negative. 2 sentinel lymph nodes were negative. Oncotype DX recurrence score was 14 [de-identified] : \par GENETICS: GENE DX VUS in PALB2 [FreeTextEntry1] : started Tamoxifen February 16,2018 [de-identified] : Ms. TOMER GOMEZ is here for a follow up visit today for left breast cancer on endocrine therapy since February 16,2018.\par She had an episode of URI 3 weeks ago, had sore throat, blood in sputum x 3. No fevers, no SOB, no CP. She went to ER. CXR was negative, treated with abx, sx completely resolved now. No leg swelling or calf pain. \par She is tolerating tamoxifen well with  + mild tolerable hot flashes. good compliance. No mood changes, wt gain, vaginal dryness, SOB, chest pain, Leg swelling or clotting issues.\par LMP date 10/3/18 periods are irregular since starting tamoxifen , no intermittent spotting, no vaginal discharge. Using contraception condoms\par GYN -  Dr. Marcin Curiel. , last seen  4/2019. a TVS showed thick lining, bx was recommended. She is going for a second opinion. \par no need for breast imaging. Energy and appetite good, wt stable. Working full time\par Reminded to see opthal every year. Needs to follow up. \par Has Hepatitis C which is stable no issues. 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

## 2019-04-25 NOTE — CONSULT LETTER
[Dear  ___] : Dear  [unfilled], [Consult Letter:] : I had the pleasure of evaluating your patient, [unfilled]. [Please see my note below.] : Please see my note below. [Consult Closing:] : Thank you very much for allowing me to participate in the care of this patient.  If you have any questions, please do not hesitate to contact me. [Sincerely,] : Sincerely, [DrDayami  ___] : Dr. BOWLING [DrDayami ___] : Dr. BOWLING [FreeTextEntry3] : Tayler Ellis M.D.\par  of Medicine\par [facility]\par [facility]\par [address] [place] [address]\par [address]\par Tele # [phone]; Fax [phone]

## 2019-05-02 ENCOUNTER — NON-APPOINTMENT (OUTPATIENT)
Age: 54
End: 2019-05-02

## 2019-05-02 ENCOUNTER — APPOINTMENT (OUTPATIENT)
Dept: OPHTHALMOLOGY | Facility: CLINIC | Age: 54
End: 2019-05-02
Payer: COMMERCIAL

## 2019-05-02 PROCEDURE — 92004 COMPRE OPH EXAM NEW PT 1/>: CPT

## 2019-05-10 NOTE — H&P PST ADULT - VENOUS THROMBOEMBOLISM CURRENT LABS/TEST RESULTS
· Keep a list of your medicines with you. List all of the prescription medicines, nonprescription medicines, supplements, natural remedies, and vitamins that you take. Tell your healthcare providers who treat you about all of the products you are taking. Your provider can provide you with a form to keep track of them. Just ask. · Follow the directions that come with your medicine, including information about food or alcohol. Make sure you know how and when to take your medicine. Do not take more or less than you are supposed to take. · Keep all medicines out of the reach of children. · Store medicines according to the directions on the label. · Monitor yourself. Learn to know how your body reacts to your new medicine and keep track of how it makes you feel before attempting (If your provider has allowed you to do so) to drive or go to work. · Seek emergency medical attention if you think you have used too much of this medicine. An overdose of any prescription medicine can be fatal. Overdose symptoms may include extreme drowsiness, muscle weakness, confusion, cold and clammy skin, pinpoint pupils, shallow breathing, slow heart rate, fainting, or coma. · Don't share prescription medicines with others, even when they seem to have the same symptoms. What may be good for you may be harmful to others. · If you are no longer taking a prescribed medication and you have pills left please take your pills out of their original containers. Mix crushed pills with an undesirable substance, such as cat litter or used coffee grounds. Put the mixture into a disposable container with a lid, such as an empty margarine tub, or into a sealable bag. Cover up or remove any of your personal information on the empty containers by covering it with black permanent marker or duct tape. Place the sealed container with the mixture, and the empty drug containers, in the trash.    · If you use a medication that is in the form of a patch, dispose of used patches by folding them in half so that the sticky sides meet, and then flushing them down a toilet. They should not be placed in the household trash where children or pets can find them. · If you have any questions, ask your provider or pharmacist for more information. · Be sure to keep all appointments for provider visits or tests. We are committed to providing you with the best care possible. In order to help us achieve these goals please remember to bring all medications, herbal products, and over the counter supplements with you to each visit. If your provider has ordered testing for you, please be sure to follow up with our office if you have not received results within 7 days after the testing took place. *If you receive a survey after visiting one of our offices, please take time to share your experience concerning your physician office visit. These surveys are confidential and no health information about you is shared. We are eager to improve for you and we are counting on your feedback to help make that happen. none

## 2019-05-17 ENCOUNTER — APPOINTMENT (OUTPATIENT)
Dept: HEPATOLOGY | Facility: CLINIC | Age: 54
End: 2019-05-17
Payer: COMMERCIAL

## 2019-05-17 VITALS
WEIGHT: 134 LBS | HEIGHT: 62 IN | HEART RATE: 79 BPM | DIASTOLIC BLOOD PRESSURE: 74 MMHG | SYSTOLIC BLOOD PRESSURE: 106 MMHG | RESPIRATION RATE: 15 BRPM | TEMPERATURE: 98 F | BODY MASS INDEX: 24.66 KG/M2

## 2019-05-17 PROCEDURE — ZZZZZ: CPT

## 2019-05-17 PROCEDURE — 91200 LIVER ELASTOGRAPHY: CPT

## 2019-05-17 PROCEDURE — 99214 OFFICE O/P EST MOD 30 MIN: CPT | Mod: 25

## 2019-05-17 NOTE — CONSULT LETTER
[Courtesy Letter:] : I had the pleasure of seeing your patient, [unfilled], in my office today. [Dear  ___] : Dear  [unfilled], [Consult Closing:] : Thank you very much for allowing me to participate in the care of this patient.  If you have any questions, please do not hesitate to contact me. [Please see my note below.] : Please see my note below. [Sincerely,] : Sincerely, [FreeTextEntry2] : Dr. Nestor Knott [Emma Poe, N.P] : Emma Poe, N.P

## 2019-05-17 NOTE — HISTORY OF PRESENT ILLNESS
[de-identified] : Nilay comes in today for follow up. She has history of hepatitis C and cirrhosis with genotype 1B. She started Harvoni on 3/24/15 and completed on 6/16/15. She achieved sustained virologic response. She feels well. No abdominal pain or changes in bowel habits.  \par \par She diagnosed with hepatitis C in 2000. \par She underwent a liver biopsy at [facility] in 2010 showing stage 3-4 disease. \par Fibroscan test from 1/6/15 was consistent with F3-4 disease. \par Fibroscan test performed 5/9/17 showed F2, steatosis S0. \par Fibroscan test from 5/17/19 showed F0-F1, S0\par \par Her risk factor appears to be medical injections while in school in the [place].\par \par She completed hepatitis B vaccination in Nov 2015. She did not respond to standard hep B vaccination and was given dose dose for 3 consecutive months completed on 9/12/17. Blood test from May 2018 confirmed immunity. \par \par She was diagnosed with breast cancer and had bilateral mastectomy in Dec 2017. She is now taking Tamoxifen. \par \par EGD from December 22, 2014 no varices. \par Colonoscopy from 7/14/17 entire examined colon was normal, she has internal hemorrhoids. No family history of colon cancer. She will repeat in 10 years. \par \par Ultrasound from 2/26/18 showed no hepatic lesion. \par \par blood tests from November 21, 2016 ALT 12, AST 15, total bilirubin 0.5, HCV RNA not detected.abdominal ultrasound from December 23, 2016 shows no hepatic lesions.\par \par Abdo US from Jon 2016 showed no hepatitic lesions. \par Abdominal sonogram from June 19, 2015 shows no hepatic lesions.\par Blood tests from April 20, 2015 platelets 134,000, ALT 18, AST 20, HCVRNA not detected. \par \par Abdominal ultrasound from December 23, 2014 shows no evidence of hepatic mass.

## 2019-05-17 NOTE — PHYSICAL EXAM
[Hepatojugular Reflux] : patient did not have a sustained hepatojugular reflux [Scleral Icterus] : No Scleral Icterus [Splenomegaly] : no splenomegaly [Spider Angioma] : No spider angioma(s) were observed [Liver Size (___ Cm)] : Liver size [unfilled] cm [Non-Tender] : non-tender [Asterixis] : no asterixis observed [Jaundice] : No jaundice [General Appearance - Alert] : alert [Palmar Erythema] : no Palmar Erythema [Neck Appearance] : the appearance of the neck was normal [Neck Cervical Mass (___cm)] : no neck mass was observed [General Appearance - In No Acute Distress] : in no acute distress [Thyroid Nodule] : there were no palpable thyroid nodules [Thyroid Diffuse Enlargement] : the thyroid was not enlarged [Jugular Venous Distention Increased] : there was no jugular-venous distention [Auscultation Breath Sounds / Voice Sounds] : lungs were clear to auscultation bilaterally [Heart Rate And Rhythm] : heart rate was normal and rhythm regular [Heart Sounds] : normal S1 and S2 [Murmurs] : no murmurs [Heart Sounds Gallop] : no gallops [Heart Sounds Pericardial Friction Rub] : no pericardial rub [Edema] : there was no peripheral edema [Bowel Sounds] : normal bowel sounds [] : no hepato-splenomegaly [Abdomen Mass (___ Cm)] : no abdominal mass palpated [Abdomen Soft] : soft [Abdomen Tenderness] : non-tender [Oriented To Time, Place, And Person] : oriented to person, place, and time [Affect] : the affect was normal

## 2019-05-17 NOTE — ASSESSMENT
[FreeTextEntry1] : 53-year-old woman with history of hepatitis C, genotype 1B and F3-F4 disease on liver biopsy in 2010. She completed Harvoni on 6/16/15. Patient is a sustained virologic responder.I explained the meaning of sustained virologic response. I explained that hepatitis C antibody will always present for life. I explained that the patient will not be able to donate blood because of the positive antibody. I have explained that the antibody is not protective and that hepatitis C infection can be acquired again if exposed. I reviewed the risk factors for acquiring hepatitis C.\par \par She underwent a liver biopsy at [facility] in 2010 showing stage 3-4 disease. Fibroscan test today showed F0-F1 which is an improvement from F3-F4. I recommended that we continue to follow her every 6 months with laboratory studies and abdominal imaging. \par \par  She is due for abdominal US  and she will call me to discuss results. If no significant abnormalities, I will see her back in 6 months. \par \par

## 2019-06-27 ENCOUNTER — FORM ENCOUNTER (OUTPATIENT)
Age: 54
End: 2019-06-27

## 2019-06-28 ENCOUNTER — APPOINTMENT (OUTPATIENT)
Dept: ULTRASOUND IMAGING | Facility: IMAGING CENTER | Age: 54
End: 2019-06-28
Payer: COMMERCIAL

## 2019-06-28 ENCOUNTER — OUTPATIENT (OUTPATIENT)
Dept: OUTPATIENT SERVICES | Facility: HOSPITAL | Age: 54
LOS: 1 days | End: 2019-06-28
Payer: COMMERCIAL

## 2019-06-28 DIAGNOSIS — Z33.2 ENCOUNTER FOR ELECTIVE TERMINATION OF PREGNANCY: Chronic | ICD-10-CM

## 2019-06-28 DIAGNOSIS — M12.9 ARTHROPATHY, UNSPECIFIED: Chronic | ICD-10-CM

## 2019-06-28 DIAGNOSIS — O00.90 UNSPECIFIED ECTOPIC PREGNANCY WITHOUT INTRAUTERINE PREGNANCY: Chronic | ICD-10-CM

## 2019-06-28 DIAGNOSIS — Z90.13 ACQUIRED ABSENCE OF BILATERAL BREASTS AND NIPPLES: Chronic | ICD-10-CM

## 2019-06-28 DIAGNOSIS — Z86.19 PERSONAL HISTORY OF OTHER INFECTIOUS AND PARASITIC DISEASES: ICD-10-CM

## 2019-06-28 PROCEDURE — 76700 US EXAM ABDOM COMPLETE: CPT

## 2019-06-28 PROCEDURE — 76700 US EXAM ABDOM COMPLETE: CPT | Mod: 26

## 2019-08-07 ENCOUNTER — APPOINTMENT (OUTPATIENT)
Dept: SURGERY | Facility: CLINIC | Age: 54
End: 2019-08-07
Payer: COMMERCIAL

## 2019-08-07 PROCEDURE — 99213K: CUSTOM

## 2019-10-10 ENCOUNTER — RX RENEWAL (OUTPATIENT)
Age: 54
End: 2019-10-10

## 2019-11-11 ENCOUNTER — APPOINTMENT (OUTPATIENT)
Dept: HEPATOLOGY | Facility: CLINIC | Age: 54
End: 2019-11-11

## 2019-11-14 ENCOUNTER — APPOINTMENT (OUTPATIENT)
Dept: HEPATOLOGY | Facility: CLINIC | Age: 54
End: 2019-11-14
Payer: COMMERCIAL

## 2019-11-14 VITALS
HEIGHT: 62 IN | WEIGHT: 134 LBS | DIASTOLIC BLOOD PRESSURE: 68 MMHG | HEART RATE: 73 BPM | SYSTOLIC BLOOD PRESSURE: 100 MMHG | RESPIRATION RATE: 15 BRPM | TEMPERATURE: 98.1 F | BODY MASS INDEX: 24.66 KG/M2

## 2019-11-14 PROCEDURE — 99214 OFFICE O/P EST MOD 30 MIN: CPT

## 2019-11-15 LAB
AFP-TM SERPL-MCNC: <1.8 NG/ML
ALBUMIN SERPL ELPH-MCNC: 4.1 G/DL
ALP BLD-CCNC: 47 U/L
ALT SERPL-CCNC: 17 U/L
ANION GAP SERPL CALC-SCNC: 12 MMOL/L
AST SERPL-CCNC: 17 U/L
BASOPHILS # BLD AUTO: 0.06 K/UL
BASOPHILS NFR BLD AUTO: 1.1 %
BILIRUB SERPL-MCNC: 0.2 MG/DL
BUN SERPL-MCNC: 13 MG/DL
CALCIUM SERPL-MCNC: 9 MG/DL
CHLORIDE SERPL-SCNC: 106 MMOL/L
CO2 SERPL-SCNC: 24 MMOL/L
CREAT SERPL-MCNC: 0.85 MG/DL
EOSINOPHIL # BLD AUTO: 0.15 K/UL
EOSINOPHIL NFR BLD AUTO: 2.6 %
HCT VFR BLD CALC: 41.9 %
HGB BLD-MCNC: 13.3 G/DL
IMM GRANULOCYTES NFR BLD AUTO: 0.2 %
LYMPHOCYTES # BLD AUTO: 1.95 K/UL
LYMPHOCYTES NFR BLD AUTO: 34.2 %
MAN DIFF?: NORMAL
MCHC RBC-ENTMCNC: 29.8 PG
MCHC RBC-ENTMCNC: 31.7 GM/DL
MCV RBC AUTO: 93.7 FL
MONOCYTES # BLD AUTO: 0.64 K/UL
MONOCYTES NFR BLD AUTO: 11.2 %
NEUTROPHILS # BLD AUTO: 2.9 K/UL
NEUTROPHILS NFR BLD AUTO: 50.7 %
PLATELET # BLD AUTO: 172 K/UL
POTASSIUM SERPL-SCNC: 4 MMOL/L
PROT SERPL-MCNC: 6.4 G/DL
RBC # BLD: 4.47 M/UL
RBC # FLD: 13 %
SODIUM SERPL-SCNC: 142 MMOL/L
WBC # FLD AUTO: 5.71 K/UL

## 2019-11-16 LAB
HCV RNA SERPL NAA DL=5-ACNC: NOT DETECTED IU/ML
HCV RNA SERPL NAA+PROBE-LOG IU: NOT DETECTED LOGIU/ML

## 2019-12-03 ENCOUNTER — OUTPATIENT (OUTPATIENT)
Dept: OUTPATIENT SERVICES | Facility: HOSPITAL | Age: 54
LOS: 1 days | Discharge: HOME | End: 2019-12-03

## 2019-12-03 DIAGNOSIS — O00.90 UNSPECIFIED ECTOPIC PREGNANCY WITHOUT INTRAUTERINE PREGNANCY: Chronic | ICD-10-CM

## 2019-12-03 DIAGNOSIS — C50.919 MALIGNANT NEOPLASM OF UNSPECIFIED SITE OF UNSPECIFIED FEMALE BREAST: ICD-10-CM

## 2019-12-03 DIAGNOSIS — M12.9 ARTHROPATHY, UNSPECIFIED: Chronic | ICD-10-CM

## 2019-12-03 DIAGNOSIS — Z33.2 ENCOUNTER FOR ELECTIVE TERMINATION OF PREGNANCY: Chronic | ICD-10-CM

## 2019-12-03 DIAGNOSIS — Z90.13 ACQUIRED ABSENCE OF BILATERAL BREASTS AND NIPPLES: Chronic | ICD-10-CM

## 2019-12-09 ENCOUNTER — APPOINTMENT (OUTPATIENT)
Dept: HEMATOLOGY ONCOLOGY | Facility: CLINIC | Age: 54
End: 2019-12-09
Payer: COMMERCIAL

## 2019-12-09 VITALS
HEART RATE: 64 BPM | DIASTOLIC BLOOD PRESSURE: 74 MMHG | TEMPERATURE: 97.8 F | SYSTOLIC BLOOD PRESSURE: 110 MMHG | WEIGHT: 134.57 LBS | RESPIRATION RATE: 18 BRPM | BODY MASS INDEX: 24.61 KG/M2 | OXYGEN SATURATION: 98 %

## 2019-12-09 PROCEDURE — 99214 OFFICE O/P EST MOD 30 MIN: CPT

## 2019-12-10 ENCOUNTER — FORM ENCOUNTER (OUTPATIENT)
Age: 54
End: 2019-12-10

## 2019-12-10 NOTE — CONSULT LETTER
[Dear  ___] : Dear  [unfilled], [Consult Letter:] : I had the pleasure of evaluating your patient, [unfilled]. [Please see my note below.] : Please see my note below. [Consult Closing:] : Thank you very much for allowing me to participate in the care of this patient.  If you have any questions, please do not hesitate to contact me. [Sincerely,] : Sincerely, [DrDayami  ___] : Dr. BOWLING [DrDayami ___] : Dr. BOWLING [FreeTextEntry3] : Tayler Ellis M.D.\par  of Medicine\par [facility]\par [facility] [place] [facility]\par [address] [place] [address]\par [address]\par Tele # [phone]; Fax [phone]

## 2019-12-10 NOTE — RESULTS/DATA
[FreeTextEntry1] : \par Laboratory data, radiology and pathology reviewed in detail at the time of visit.\par

## 2019-12-10 NOTE — ASSESSMENT
[Curative] : Goals of care discussed with patient: Curative [FreeTextEntry1] : This is a 53-year-old premenopausal lady who is diagnosed with stage IA (T1b N0) left breast well-differentiated invasive ductal carcinoma ER positive, [place] positive and HER-2/geraldo negative . She underwent bilateral nipple sparing mastectomies and is healing well. Oncotype DX score is 14 which predicts 9% risk of distant recurrence after 5 years of tamoxifen. Her medical h/o is significant for treated HCV with undetectable viral load. Started Ann  February 16,2018\par \par - Breast ca: SONAL. She is tolerating tamoxifen very well without significant side effects. Reports good compliance. Plan to continue tamoxifen for 10 years. Will switch to AI after menopause. Annual gyn and opthal f/u. No breast imaging needed. I will check CBC, CMP and vitamin D today. FSH and estradiol checked - NOT IN MENOPAUSE.\par - Mild tolerable hot flashes- advised to wear layers and use fan as needed \par - Irregular periods: She will followup with her gynecologist every 6-12 months and report any unusual vaginal bleeding or spotting. Continue contraception with condoms.\par -Hepatitis C Cirrhosis: Has mild thrombocytopenia 2/2 cirrhosis. LFT and AF normal\par - I educated her about signs and symptoms of DVT/PE. Patient will report if she develops acute onset chest pain shortness of breath, leg swelling or calf pain. \par - Life style modifications, healthy diet and exercise d/w her\par - She c/o tenderness in the left axilla on exam. No palpable masses. Check sono\par \par RTC 6m.

## 2019-12-10 NOTE — REASON FOR VISIT
[Follow-Up Visit] : a follow-up [Spouse] : spouse [Other: _____] : [unfilled] [FreeTextEntry2] : Breast ca ER/[place] POSITIVE, HER-2 NEGATIVE

## 2019-12-10 NOTE — PHYSICAL EXAM
[Fully active, able to carry on all pre-disease performance without restriction] : Status 0 - Fully active, able to carry on all pre-disease performance without restriction [Normal] : affect appropriate [de-identified] : healed MAGALYS flap. [de-identified] : b/ nipple sparing mastectomies with reconstruction. No CW or axillary nodules

## 2019-12-10 NOTE — HISTORY OF PRESENT ILLNESS
[Treatment Protocol] : Treatment Protocol [1 - Distress Level] : Distress Level: 1 [Patient given social work contact information and resource sheet] : Patient was given social work contact information and resource sheet [IA] : IA [de-identified] : Ms. TOMER GOMEZ is a 53 year old female here for an evaluation of breast cancer. Her oncologic history is as follows:\par \par She underwent a routine screening mammogram on 11/1/17 which showed an indeterminate 0.6 cm mass in the left breast. She underwent left breast biopsy on 11/15/17 which showed invasive well-differentiated ductal carcinoma, Conner score 4/9, with low-grade DCIS. ER/NM 90%, HER-2/geraldo negative.\par \par She underwent bilateral mastectomies and bilateral sentinel axillary lymph nodes on 12/26/17. Pathology from the right side showed radial scar and lymph nodes were negative. Pathology from the left side showed invasive ductal carcinoma, well-differentiated, 0.7 cm, with low-grade DCIS. Margins were negative. 2 sentinel lymph nodes were negative. Oncotype DX recurrence score was 14 [de-identified] : \par GENETICS: GENE DX VUS in PALB2 [FreeTextEntry1] : started Tamoxifen February 16,2018 [de-identified] : Ms. TOMER GOMEZ is here for a follow up visit today for left breast cancer on endocrine therapy since February 16,2018.\par She is tolerating tamoxifen well with  + mild tolerable hot flashes. good compliance. No mood changes, wt gain, vaginal dryness, SOB, chest pain, Leg swelling or clotting issues.\par LMP date 5/2019, periods are irregular since starting tamoxifen , no intermittent spotting, no vaginal discharge. Using contraception condoms. Energy and appetite good, wt stable. Working full time\par GYN -  Dr Tiara Mitchell. , last seen  5/2019\par Opthal 5/2019\par no need for breast imaging. \par Has Hepatitis C which is stable no issues. 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

## 2019-12-11 ENCOUNTER — APPOINTMENT (OUTPATIENT)
Dept: ULTRASOUND IMAGING | Facility: IMAGING CENTER | Age: 54
End: 2019-12-11
Payer: COMMERCIAL

## 2019-12-11 ENCOUNTER — OUTPATIENT (OUTPATIENT)
Dept: OUTPATIENT SERVICES | Facility: HOSPITAL | Age: 54
LOS: 1 days | End: 2019-12-11
Payer: COMMERCIAL

## 2019-12-11 DIAGNOSIS — M12.9 ARTHROPATHY, UNSPECIFIED: Chronic | ICD-10-CM

## 2019-12-11 DIAGNOSIS — Z33.2 ENCOUNTER FOR ELECTIVE TERMINATION OF PREGNANCY: Chronic | ICD-10-CM

## 2019-12-11 DIAGNOSIS — C50.919 MALIGNANT NEOPLASM OF UNSPECIFIED SITE OF UNSPECIFIED FEMALE BREAST: ICD-10-CM

## 2019-12-11 DIAGNOSIS — O00.90 UNSPECIFIED ECTOPIC PREGNANCY WITHOUT INTRAUTERINE PREGNANCY: Chronic | ICD-10-CM

## 2019-12-11 DIAGNOSIS — Z90.13 ACQUIRED ABSENCE OF BILATERAL BREASTS AND NIPPLES: Chronic | ICD-10-CM

## 2019-12-11 PROCEDURE — 76642 ULTRASOUND BREAST LIMITED: CPT | Mod: 26,LT

## 2019-12-11 PROCEDURE — 76642 ULTRASOUND BREAST LIMITED: CPT

## 2019-12-23 ENCOUNTER — APPOINTMENT (OUTPATIENT)
Dept: ULTRASOUND IMAGING | Facility: IMAGING CENTER | Age: 54
End: 2019-12-23
Payer: COMMERCIAL

## 2019-12-23 ENCOUNTER — OUTPATIENT (OUTPATIENT)
Dept: OUTPATIENT SERVICES | Facility: HOSPITAL | Age: 54
LOS: 1 days | End: 2019-12-23
Payer: COMMERCIAL

## 2019-12-23 DIAGNOSIS — K74.60 UNSPECIFIED CIRRHOSIS OF LIVER: ICD-10-CM

## 2019-12-23 DIAGNOSIS — O00.90 UNSPECIFIED ECTOPIC PREGNANCY WITHOUT INTRAUTERINE PREGNANCY: Chronic | ICD-10-CM

## 2019-12-23 DIAGNOSIS — Z33.2 ENCOUNTER FOR ELECTIVE TERMINATION OF PREGNANCY: Chronic | ICD-10-CM

## 2019-12-23 DIAGNOSIS — M12.9 ARTHROPATHY, UNSPECIFIED: Chronic | ICD-10-CM

## 2019-12-23 DIAGNOSIS — Z90.13 ACQUIRED ABSENCE OF BILATERAL BREASTS AND NIPPLES: Chronic | ICD-10-CM

## 2019-12-23 PROCEDURE — 76700 US EXAM ABDOM COMPLETE: CPT

## 2019-12-23 PROCEDURE — 76700 US EXAM ABDOM COMPLETE: CPT | Mod: 26

## 2020-06-23 ENCOUNTER — OUTPATIENT (OUTPATIENT)
Dept: OUTPATIENT SERVICES | Facility: HOSPITAL | Age: 55
LOS: 1 days | Discharge: HOME | End: 2020-06-23

## 2020-06-23 DIAGNOSIS — Z33.2 ENCOUNTER FOR ELECTIVE TERMINATION OF PREGNANCY: Chronic | ICD-10-CM

## 2020-06-23 DIAGNOSIS — M12.9 ARTHROPATHY, UNSPECIFIED: Chronic | ICD-10-CM

## 2020-06-23 DIAGNOSIS — C50.919 MALIGNANT NEOPLASM OF UNSPECIFIED SITE OF UNSPECIFIED FEMALE BREAST: ICD-10-CM

## 2020-06-23 DIAGNOSIS — Z90.13 ACQUIRED ABSENCE OF BILATERAL BREASTS AND NIPPLES: Chronic | ICD-10-CM

## 2020-06-23 DIAGNOSIS — O00.90 UNSPECIFIED ECTOPIC PREGNANCY WITHOUT INTRAUTERINE PREGNANCY: Chronic | ICD-10-CM

## 2020-06-29 ENCOUNTER — RESULT REVIEW (OUTPATIENT)
Age: 55
End: 2020-06-29

## 2020-06-29 ENCOUNTER — APPOINTMENT (OUTPATIENT)
Dept: HEMATOLOGY ONCOLOGY | Facility: CLINIC | Age: 55
End: 2020-06-29
Payer: COMMERCIAL

## 2020-06-29 VITALS
RESPIRATION RATE: 17 BRPM | SYSTOLIC BLOOD PRESSURE: 110 MMHG | HEART RATE: 69 BPM | BODY MASS INDEX: 25.11 KG/M2 | DIASTOLIC BLOOD PRESSURE: 76 MMHG | OXYGEN SATURATION: 98 % | TEMPERATURE: 98.4 F | WEIGHT: 137.3 LBS

## 2020-06-29 LAB
BASOPHILS # BLD AUTO: 0.04 K/UL — SIGNIFICANT CHANGE UP (ref 0–0.2)
BASOPHILS NFR BLD AUTO: 0.8 % — SIGNIFICANT CHANGE UP (ref 0–2)
EOSINOPHIL # BLD AUTO: 0.09 K/UL — SIGNIFICANT CHANGE UP (ref 0–0.5)
EOSINOPHIL NFR BLD AUTO: 1.9 % — SIGNIFICANT CHANGE UP (ref 0–6)
HCT VFR BLD CALC: 40 % — SIGNIFICANT CHANGE UP (ref 34.5–45)
HGB BLD-MCNC: 13 G/DL — SIGNIFICANT CHANGE UP (ref 11.5–15.5)
IMM GRANULOCYTES NFR BLD AUTO: 0.6 % — SIGNIFICANT CHANGE UP (ref 0–1.5)
LYMPHOCYTES # BLD AUTO: 1.49 K/UL — SIGNIFICANT CHANGE UP (ref 1–3.3)
LYMPHOCYTES # BLD AUTO: 30.8 % — SIGNIFICANT CHANGE UP (ref 13–44)
MCHC RBC-ENTMCNC: 30.5 PG — SIGNIFICANT CHANGE UP (ref 27–34)
MCHC RBC-ENTMCNC: 32.5 GM/DL — SIGNIFICANT CHANGE UP (ref 32–36)
MCV RBC AUTO: 93.9 FL — SIGNIFICANT CHANGE UP (ref 80–100)
MONOCYTES # BLD AUTO: 0.59 K/UL — SIGNIFICANT CHANGE UP (ref 0–0.9)
MONOCYTES NFR BLD AUTO: 12.2 % — SIGNIFICANT CHANGE UP (ref 2–14)
NEUTROPHILS # BLD AUTO: 2.59 K/UL — SIGNIFICANT CHANGE UP (ref 1.8–7.4)
NEUTROPHILS NFR BLD AUTO: 53.7 % — SIGNIFICANT CHANGE UP (ref 43–77)
NRBC # BLD: 0 /100 WBCS — SIGNIFICANT CHANGE UP (ref 0–0)
PLATELET # BLD AUTO: 127 K/UL — LOW (ref 150–400)
RBC # BLD: 4.26 M/UL — SIGNIFICANT CHANGE UP (ref 3.8–5.2)
RBC # FLD: 13.2 % — SIGNIFICANT CHANGE UP (ref 10.3–14.5)
WBC # BLD: 4.83 K/UL — SIGNIFICANT CHANGE UP (ref 3.8–10.5)
WBC # FLD AUTO: 4.83 K/UL — SIGNIFICANT CHANGE UP (ref 3.8–10.5)

## 2020-06-29 PROCEDURE — 99214 OFFICE O/P EST MOD 30 MIN: CPT

## 2020-06-30 NOTE — PHYSICAL EXAM
[Fully active, able to carry on all pre-disease performance without restriction] : Status 0 - Fully active, able to carry on all pre-disease performance without restriction [Normal] : affect appropriate [de-identified] : b/ nipple sparing mastectomies with reconstruction. No CW or axillary nodules [de-identified] : healed MAGALYS flap.

## 2020-06-30 NOTE — ASSESSMENT
[Curative] : Goals of care discussed with patient: Curative [FreeTextEntry1] : This is a 53-year-old premenopausal lady who is diagnosed with stage IA (T1b N0) left breast well-differentiated invasive ductal carcinoma ER positive, [place] positive and HER-2/geraldo negative . She underwent bilateral nipple sparing mastectomies and is healing well. Oncotype DX score is 14 which predicts 9% risk of distant recurrence after 5 years of tamoxifen. Her medical h/o is significant for treated HCV with undetectable viral load. Started Ann  February 16,2018\par \par - Breast ca: SONAL. She is tolerating tamoxifen very well without significant side effects. Reports good compliance. Plan to continue tamoxifen for 10 years. Will switch to AI after menopause. Annual gyn and opthal f/u. No breast imaging needed. I will check CBC, CMP and vitamin D today. FSH and estradiol checked - NOT IN MENOPAUSE.\par - Mild tolerable hot flashes- advised to wear layers and use fan as needed \par - Irregular periods: She will followup with her gynecologist every 6-12 months and report any unusual vaginal bleeding or spotting. Continue contraception with condoms.\par -Hepatitis C Cirrhosis: Has mild thrombocytopenia 2/2 cirrhosis. LFT and AF normal\par - I educated her about signs and symptoms of DVT/PE. Patient will report if she develops acute onset chest pain shortness of breath, leg swelling or calf pain. \par - Life style modifications, healthy diet and exercise d/w her\par - covid ab neg\par - Low Vitamin D: concern for worsening bone loss due to low vit D. Discussed to increase Vit D intake \par \par \par RTC 6m.

## 2020-06-30 NOTE — REASON FOR VISIT
[Follow-Up Visit] : a follow-up [Other: _____] : [unfilled] [FreeTextEntry2] : Breast ca ER/[place] POSITIVE, HER-2 NEGATIVE

## 2020-06-30 NOTE — HISTORY OF PRESENT ILLNESS
[Treatment Protocol] : Treatment Protocol [Patient given social work contact information and resource sheet] : Patient was given social work contact information and resource sheet [1 - Distress Level] : Distress Level: 1 [IA] : IA [de-identified] : Ms. TOMER GOMEZ is a 53 year old female here for an evaluation of breast cancer. Her oncologic history is as follows:\par \par She underwent a routine screening mammogram on 11/1/17 which showed an indeterminate 0.6 cm mass in the left breast. She underwent left breast biopsy on 11/15/17 which showed invasive well-differentiated ductal carcinoma, Adams score 4/9, with low-grade DCIS. ER/SD 90%, HER-2/geraldo negative.\par \par She underwent bilateral mastectomies and bilateral sentinel axillary lymph nodes on 12/26/17. Pathology from the right side showed radial scar and lymph nodes were negative. Pathology from the left side showed invasive ductal carcinoma, well-differentiated, 0.7 cm, with low-grade DCIS. Margins were negative. 2 sentinel lymph nodes were negative. Oncotype DX recurrence score was 14 [FreeTextEntry1] : started Tamoxifen February 16,2018 [de-identified] : \par GENETICS: GENE DX VUS in PALB2 [de-identified] : Ms. TOMER GOMEZ is here for a follow up visit today for left breast cancer on endocrine therapy since February 16,2018.\par She is tolerating tamoxifen well with  + mild tolerable hot flashes. good compliance. No mood changes, wt gain, vaginal dryness, SOB, chest pain, Leg swelling or clotting issues.\par LMP date 5/2019, periods are irregular since starting tamoxifen , no intermittent spotting, no vaginal discharge. Using contraception condoms. Energy and appetite good, wt stable. Working full time from home\par GYN -  Dr Tiara Mitchell. , last seen  5/2019- WILL SCHEDULE APPT IN JULY\par Opthal 5/2019\par no need for breast imaging. \par Has Hepatitis C which is stable no issues. 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

## 2020-06-30 NOTE — CONSULT LETTER
[Dear  ___] : Dear  [unfilled], [Please see my note below.] : Please see my note below. [Consult Letter:] : I had the pleasure of evaluating your patient, [unfilled]. [Consult Closing:] : Thank you very much for allowing me to participate in the care of this patient.  If you have any questions, please do not hesitate to contact me. [Sincerely,] : Sincerely, [DrDayami ___] : Dr. BOWLING [DrDayami  ___] : Dr. BOWLING [FreeTextEntry3] : Tayler Ellis M.D.\par  of Medicine\par [facility]\par [facility] [place]\par [address] [place] [address]\par [address]\par Tele # [phone]; Fax [phone]

## 2020-07-02 ENCOUNTER — APPOINTMENT (OUTPATIENT)
Dept: HEPATOLOGY | Facility: CLINIC | Age: 55
End: 2020-07-02

## 2020-07-02 LAB
25(OH)D3 SERPL-MCNC: 29.7 NG/ML
ALBUMIN SERPL ELPH-MCNC: 4.3 G/DL
ALP BLD-CCNC: 47 U/L
ALT SERPL-CCNC: 17 U/L
ANION GAP SERPL CALC-SCNC: 9 MMOL/L
AST SERPL-CCNC: 20 U/L
BILIRUB SERPL-MCNC: 0.4 MG/DL
BUN SERPL-MCNC: 12 MG/DL
CALCIUM SERPL-MCNC: 9.4 MG/DL
CHLORIDE SERPL-SCNC: 106 MMOL/L
CO2 SERPL-SCNC: 26 MMOL/L
CREAT SERPL-MCNC: 0.78 MG/DL
ESTRADIOL SERPL-MCNC: 168 PG/ML
FSH SERPL-MCNC: 23.7 IU/L
GLUCOSE SERPL-MCNC: 91 MG/DL
POTASSIUM SERPL-SCNC: 4.3 MMOL/L
PROT SERPL-MCNC: 6.3 G/DL
SARS-COV-2 IGG SERPL IA-ACNC: 5.07 AU/ML
SARS-COV-2 IGG SERPL QL IA: NEGATIVE
SODIUM SERPL-SCNC: 141 MMOL/L

## 2020-08-19 ENCOUNTER — NON-APPOINTMENT (OUTPATIENT)
Age: 55
End: 2020-08-19

## 2020-08-19 ENCOUNTER — APPOINTMENT (OUTPATIENT)
Dept: OPHTHALMOLOGY | Facility: CLINIC | Age: 55
End: 2020-08-19
Payer: COMMERCIAL

## 2020-08-19 PROCEDURE — 92014 COMPRE OPH EXAM EST PT 1/>: CPT

## 2020-08-19 PROCEDURE — 92015 DETERMINE REFRACTIVE STATE: CPT

## 2020-08-31 ENCOUNTER — RESULT REVIEW (OUTPATIENT)
Age: 55
End: 2020-08-31

## 2020-09-30 ENCOUNTER — APPOINTMENT (OUTPATIENT)
Dept: SURGERY | Facility: CLINIC | Age: 55
End: 2020-09-30
Payer: COMMERCIAL

## 2020-09-30 PROCEDURE — 99213K: CUSTOM

## 2020-10-22 PROBLEM — Z83.2 FAMILY HISTORY OF COAGULATION DISORDER: Status: INACTIVE | Noted: 2018-01-17

## 2020-11-24 ENCOUNTER — APPOINTMENT (OUTPATIENT)
Dept: HEPATOLOGY | Facility: CLINIC | Age: 55
End: 2020-11-24
Payer: COMMERCIAL

## 2020-11-24 ENCOUNTER — LABORATORY RESULT (OUTPATIENT)
Age: 55
End: 2020-11-24

## 2020-11-24 VITALS
HEIGHT: 62 IN | HEART RATE: 68 BPM | BODY MASS INDEX: 25.03 KG/M2 | TEMPERATURE: 96.6 F | WEIGHT: 136 LBS | SYSTOLIC BLOOD PRESSURE: 100 MMHG | DIASTOLIC BLOOD PRESSURE: 72 MMHG | RESPIRATION RATE: 17 BRPM

## 2020-11-24 LAB
BASOPHILS # BLD AUTO: 0.04 K/UL
BASOPHILS NFR BLD AUTO: 0.8 %
EOSINOPHIL # BLD AUTO: 0.12 K/UL
EOSINOPHIL NFR BLD AUTO: 2.4 %
HCT VFR BLD CALC: 41.6 %
HGB BLD-MCNC: 13.6 G/DL
IMM GRANULOCYTES NFR BLD AUTO: 0.2 %
INR PPP: 1.08 RATIO
LYMPHOCYTES # BLD AUTO: 1.41 K/UL
LYMPHOCYTES NFR BLD AUTO: 28.1 %
MAN DIFF?: NORMAL
MCHC RBC-ENTMCNC: 30.8 PG
MCHC RBC-ENTMCNC: 32.7 GM/DL
MCV RBC AUTO: 94.1 FL
MONOCYTES # BLD AUTO: 0.48 K/UL
MONOCYTES NFR BLD AUTO: 9.6 %
NEUTROPHILS # BLD AUTO: 2.95 K/UL
NEUTROPHILS NFR BLD AUTO: 58.9 %
PLATELET # BLD AUTO: 153 K/UL
PT BLD: 12.7 SEC
RBC # BLD: 4.42 M/UL
RBC # FLD: 13.1 %
WBC # FLD AUTO: 5.01 K/UL

## 2020-11-24 PROCEDURE — 99214 OFFICE O/P EST MOD 30 MIN: CPT

## 2020-11-24 RX ORDER — TAMOXIFEN CITRATE 20 MG/1
20 TABLET, FILM COATED ORAL DAILY
Qty: 90 | Refills: 0 | Status: DISCONTINUED | COMMUNITY
Start: 2018-01-17 | End: 2020-11-24

## 2020-11-25 LAB
25(OH)D3 SERPL-MCNC: 34.7 NG/ML
AFP-TM SERPL-MCNC: <1.8 NG/ML
ALBUMIN SERPL ELPH-MCNC: 4.3 G/DL
ALP BLD-CCNC: 59 U/L
ALT SERPL-CCNC: 16 U/L
ANION GAP SERPL CALC-SCNC: 10 MMOL/L
AST SERPL-CCNC: 17 U/L
BILIRUB SERPL-MCNC: 0.5 MG/DL
BUN SERPL-MCNC: 13 MG/DL
CALCIUM SERPL-MCNC: 9.4 MG/DL
CHLORIDE SERPL-SCNC: 108 MMOL/L
CO2 SERPL-SCNC: 26 MMOL/L
CREAT SERPL-MCNC: 0.78 MG/DL
FERRITIN SERPL-MCNC: 87 NG/ML
FSH SERPL-MCNC: 28.5 IU/L
LH SERPL-ACNC: 17.7 IU/L
POTASSIUM SERPL-SCNC: 4.3 MMOL/L
PROGEST SERPL-MCNC: 0.2 NG/ML
PROLACTIN SERPL-MCNC: 8.5 NG/ML
PROT SERPL-MCNC: 6.8 G/DL
SARS-COV-2 IGG SERPL IA-ACNC: <0.1 INDEX
SARS-COV-2 IGG SERPL QL IA: NEGATIVE
SODIUM SERPL-SCNC: 145 MMOL/L
TESTOST SERPL-MCNC: 11.1 NG/DL
TSH SERPL-ACNC: 4.32 UIU/ML

## 2020-11-25 NOTE — HISTORY OF PRESENT ILLNESS
[de-identified] : Ms. TOMER GOMEZ is 55 year old female who presents for the follow up appointment with hepatitis C and cirrhosis with genotype 1B. She started Harvoni on 3/24/15 and completed on 6/16/15. She achieved sustained virologic response. H/O breast cancer S/p bilateral mastectomy in Dec 2017. She is now taking Tamoxifen. She feels well. She has no abdominal pain or changes in bowel habits. \par \par Pertinent History: She was diagnosed with hepatitis C in 2000. She underwent a liver biopsy at [facility] in 2010 showing stage 3-4 disease. Her contributing risk factor likely to be medical injections while in school in the [place]. \par Immunity: Completed hepatitis B vaccination in Nov 2015. She did not respond to standard hep B vaccination and was given for 3 dose series every month that was completed on 9/12/17. Blood test from May 2018 confirmed immunity.\par \par Fibroscan test from 5/17/19 showed F0-F1, S0\par Fibroscan test performed 5/9/17 showed F2, steatosis S0. \par Fibroscan test from 1/6/15 was consistent with F3-4 disease. \par \par EGD from December 22, 2014 no varices. \par Colonoscopy from 7/14/17 entire examined colon was normal, she has internal hemorrhoids. No family history of colon cancer. She will repeat in 10 years. \par \par Ultrasound from 2/26/18 showed no hepatic lesion. \par \par Blood tests from November 21, 2016 ALT 12, AST 15, total bilirubin 0.5, HCV RNA not detected. Abdominal ultrasound from December 23, 2016 shows no hepatic lesions.\par \par Ab US from Jan 2016 showed no hepatic lesions. \par Abdominal sonogram from June 19, 2015 shows no hepatic lesions. Blood tests from April 20, 2015 platelets 134,000, ALT 18, AST 20, HCV-RNA not detected. \par \par Abdominal ultrasound from December 23, 2014 shows no evidence of hepatic mass.\par

## 2020-11-25 NOTE — PHYSICAL EXAM
[Non-Tender] : non-tender [General Appearance - Alert] : alert [General Appearance - In No Acute Distress] : in no acute distress [Neck Appearance] : the appearance of the neck was normal [Neck Cervical Mass (___cm)] : no neck mass was observed [Jugular Venous Distention Increased] : there was no jugular-venous distention [Thyroid Diffuse Enlargement] : the thyroid was not enlarged [Thyroid Nodule] : there were no palpable thyroid nodules [Auscultation Breath Sounds / Voice Sounds] : lungs were clear to auscultation bilaterally [Heart Rate And Rhythm] : heart rate was normal and rhythm regular [Heart Sounds] : normal S1 and S2 [Heart Sounds Gallop] : no gallops [Murmurs] : no murmurs [Heart Sounds Pericardial Friction Rub] : no pericardial rub [Edema] : there was no peripheral edema [Bowel Sounds] : normal bowel sounds [Abdomen Soft] : soft [Abdomen Tenderness] : non-tender [Abdomen Mass (___ Cm)] : no abdominal mass palpated [No CVA Tenderness] : no ~M costovertebral angle tenderness [No Spinal Tenderness] : no spinal tenderness [Abnormal Walk] : normal gait [Musculoskeletal - Swelling] : no joint swelling seen [Skin Color & Pigmentation] : normal skin color and pigmentation [Skin Turgor] : normal skin turgor [] : no rash [Deep Tendon Reflexes (DTR)] : deep tendon reflexes were 2+ and symmetric [Sensation] : the sensory exam was normal to light touch and pinprick [No Focal Deficits] : no focal deficits [Oriented To Time, Place, And Person] : oriented to person, place, and time [Affect] : the affect was normal [Scleral Icterus] : No Scleral Icterus [Hepatojugular Reflux] : patient did not have a sustained hepatojugular reflux [Spider Angioma] : No spider angioma(s) were observed [Splenomegaly] : no splenomegaly [Asterixis] : no asterixis observed [Jaundice] : No jaundice [Palmar Erythema] : no Palmar Erythema [Sclera] : the sclera and conjunctiva were normal [Outer Ear] : the ears and nose were normal in appearance [Cervical Lymph Nodes Enlarged Posterior Bilaterally] : posterior cervical [Supraclavicular Lymph Nodes Enlarged Bilaterally] : supraclavicular

## 2020-11-25 NOTE — ASSESSMENT
[FreeTextEntry1] : Ms. TOMER GOMEZ is 55 year old female who presents for the follow up appointment with hepatitis C, genotype 1B and F3-F4 disease on liver biopsy in 2010. She completed Harvoni on 6/16/15. She is a sustained virologic responder. \par \par - Reviewed the Labs and will call us back to discuss the US abdomen results.\par - Liver biopsy at [facility] in 2010 showing stage 3-4 disease. Fibroscan May 2019 showed F0-F1 which is an improvement from F3-F4. \par - PLAN to continue to follow her every 6 months with laboratory studies and abdominal imaging. Advised to do FibroScan with RPA.\par \par TOMER was educated about the meaning of sustained virological response. Explained that hepatitis C antibody is present for life. Reviewed that the patient should not donate blood because of the positive antibody and the antibody is not protective and hepatitis C infection can be acquired again if exposed. Taught back the risk factors for acquiring hepatitis C.\par  \par Educated the 4 hours of strict NPO prior to procedure, to avoid the body entering into gastric phase giving the Fibroscan the artifact like the stiffness of lIver. Which can alter the stiffness score. \par \par Encouraged to call back in the interim with any issues or concerns so that we can address and assist as required. \par

## 2020-11-26 LAB — SARS-COV-2 N GENE NPH QL NAA+PROBE: NOT DETECTED

## 2020-11-30 LAB
ANA SER IF-ACNC: NEGATIVE
ESTROGEN SERPL-MCNC: 137 PG/ML

## 2020-12-21 ENCOUNTER — OUTPATIENT (OUTPATIENT)
Dept: OUTPATIENT SERVICES | Facility: HOSPITAL | Age: 55
LOS: 1 days | Discharge: HOME | End: 2020-12-21

## 2020-12-21 DIAGNOSIS — Z33.2 ENCOUNTER FOR ELECTIVE TERMINATION OF PREGNANCY: Chronic | ICD-10-CM

## 2020-12-21 DIAGNOSIS — C50.919 MALIGNANT NEOPLASM OF UNSPECIFIED SITE OF UNSPECIFIED FEMALE BREAST: ICD-10-CM

## 2020-12-21 DIAGNOSIS — Z90.13 ACQUIRED ABSENCE OF BILATERAL BREASTS AND NIPPLES: Chronic | ICD-10-CM

## 2020-12-21 DIAGNOSIS — O00.90 UNSPECIFIED ECTOPIC PREGNANCY WITHOUT INTRAUTERINE PREGNANCY: Chronic | ICD-10-CM

## 2020-12-21 DIAGNOSIS — M12.9 ARTHROPATHY, UNSPECIFIED: Chronic | ICD-10-CM

## 2021-01-19 ENCOUNTER — APPOINTMENT (OUTPATIENT)
Dept: ULTRASOUND IMAGING | Facility: IMAGING CENTER | Age: 56
End: 2021-01-19
Payer: COMMERCIAL

## 2021-01-19 ENCOUNTER — OUTPATIENT (OUTPATIENT)
Dept: OUTPATIENT SERVICES | Facility: HOSPITAL | Age: 56
LOS: 1 days | End: 2021-01-19
Payer: COMMERCIAL

## 2021-01-19 DIAGNOSIS — M12.9 ARTHROPATHY, UNSPECIFIED: Chronic | ICD-10-CM

## 2021-01-19 DIAGNOSIS — Z00.8 ENCOUNTER FOR OTHER GENERAL EXAMINATION: ICD-10-CM

## 2021-01-19 DIAGNOSIS — Z90.13 ACQUIRED ABSENCE OF BILATERAL BREASTS AND NIPPLES: Chronic | ICD-10-CM

## 2021-01-19 DIAGNOSIS — Z33.2 ENCOUNTER FOR ELECTIVE TERMINATION OF PREGNANCY: Chronic | ICD-10-CM

## 2021-01-19 DIAGNOSIS — O00.90 UNSPECIFIED ECTOPIC PREGNANCY WITHOUT INTRAUTERINE PREGNANCY: Chronic | ICD-10-CM

## 2021-01-19 DIAGNOSIS — Z86.19 PERSONAL HISTORY OF OTHER INFECTIOUS AND PARASITIC DISEASES: ICD-10-CM

## 2021-01-19 PROCEDURE — 76700 US EXAM ABDOM COMPLETE: CPT | Mod: 26

## 2021-01-19 PROCEDURE — 76700 US EXAM ABDOM COMPLETE: CPT

## 2021-02-21 NOTE — BRIEF OPERATIVE NOTE - BRIEF OP NOTE DRAINS
Over the counter medications:  -Start Mucinex [Guaifenesin] (blue) twice a day for cough and movement of sputum.  Take with a large glass of water  -Take Ibuprofen 400-600 mg every 4-6 hours or Aleve/naproxen every 12 hours as needed for fever, pain, and inflammation.  Take with food.   -Take Tylenol every 4 hours as needed for additional pain and fever relief.  - preferred due to COVID  -Start taking Allegra (fexofenadine), Claritin (loratadine) , or Zyrtec (cetirizine)  for allergies. Generic ok  -Start flonase nasal spray for inflammation in sinuses    Other things to try for a sore throat:   - Teaspoons of honey   -Try saltwater gargles or drinking broth to help with swelling in the back of your throat. Avoid drinking broth if you have any cardiac issues or swelling in your legs.     Ordered from Pharmacy           None

## 2021-03-09 ENCOUNTER — OUTPATIENT (OUTPATIENT)
Dept: OUTPATIENT SERVICES | Facility: HOSPITAL | Age: 56
LOS: 1 days | Discharge: HOME | End: 2021-03-09

## 2021-03-09 DIAGNOSIS — M12.9 ARTHROPATHY, UNSPECIFIED: Chronic | ICD-10-CM

## 2021-03-09 DIAGNOSIS — O00.90 UNSPECIFIED ECTOPIC PREGNANCY WITHOUT INTRAUTERINE PREGNANCY: Chronic | ICD-10-CM

## 2021-03-09 DIAGNOSIS — Z90.13 ACQUIRED ABSENCE OF BILATERAL BREASTS AND NIPPLES: Chronic | ICD-10-CM

## 2021-03-09 DIAGNOSIS — C50.919 MALIGNANT NEOPLASM OF UNSPECIFIED SITE OF UNSPECIFIED FEMALE BREAST: ICD-10-CM

## 2021-03-09 DIAGNOSIS — Z33.2 ENCOUNTER FOR ELECTIVE TERMINATION OF PREGNANCY: Chronic | ICD-10-CM

## 2021-03-10 ENCOUNTER — APPOINTMENT (OUTPATIENT)
Dept: HEMATOLOGY ONCOLOGY | Facility: CLINIC | Age: 56
End: 2021-03-10
Payer: COMMERCIAL

## 2021-03-10 ENCOUNTER — RESULT REVIEW (OUTPATIENT)
Age: 56
End: 2021-03-10

## 2021-03-10 VITALS
OXYGEN SATURATION: 99 % | BODY MASS INDEX: 26.2 KG/M2 | RESPIRATION RATE: 16 BRPM | HEIGHT: 62.2 IN | WEIGHT: 144.18 LBS | DIASTOLIC BLOOD PRESSURE: 77 MMHG | TEMPERATURE: 97.3 F | HEART RATE: 67 BPM | SYSTOLIC BLOOD PRESSURE: 109 MMHG

## 2021-03-10 LAB
BASOPHILS # BLD AUTO: 0.04 K/UL — SIGNIFICANT CHANGE UP (ref 0–0.2)
BASOPHILS NFR BLD AUTO: 0.7 % — SIGNIFICANT CHANGE UP (ref 0–2)
EOSINOPHIL # BLD AUTO: 0.18 K/UL — SIGNIFICANT CHANGE UP (ref 0–0.5)
EOSINOPHIL NFR BLD AUTO: 3 % — SIGNIFICANT CHANGE UP (ref 0–6)
HCT VFR BLD CALC: 39.1 % — SIGNIFICANT CHANGE UP (ref 34.5–45)
HGB BLD-MCNC: 13.2 G/DL — SIGNIFICANT CHANGE UP (ref 11.5–15.5)
IMM GRANULOCYTES NFR BLD AUTO: 0.3 % — SIGNIFICANT CHANGE UP (ref 0–1.5)
LYMPHOCYTES # BLD AUTO: 2.06 K/UL — SIGNIFICANT CHANGE UP (ref 1–3.3)
LYMPHOCYTES # BLD AUTO: 34.2 % — SIGNIFICANT CHANGE UP (ref 13–44)
MCHC RBC-ENTMCNC: 30.5 PG — SIGNIFICANT CHANGE UP (ref 27–34)
MCHC RBC-ENTMCNC: 33.8 G/DL — SIGNIFICANT CHANGE UP (ref 32–36)
MCV RBC AUTO: 90.3 FL — SIGNIFICANT CHANGE UP (ref 80–100)
MONOCYTES # BLD AUTO: 0.67 K/UL — SIGNIFICANT CHANGE UP (ref 0–0.9)
MONOCYTES NFR BLD AUTO: 11.1 % — SIGNIFICANT CHANGE UP (ref 2–14)
NEUTROPHILS # BLD AUTO: 3.06 K/UL — SIGNIFICANT CHANGE UP (ref 1.8–7.4)
NEUTROPHILS NFR BLD AUTO: 50.7 % — SIGNIFICANT CHANGE UP (ref 43–77)
NRBC # BLD: 0 /100 WBCS — SIGNIFICANT CHANGE UP (ref 0–0)
PLATELET # BLD AUTO: 165 K/UL — SIGNIFICANT CHANGE UP (ref 150–400)
RBC # BLD: 4.33 M/UL — SIGNIFICANT CHANGE UP (ref 3.8–5.2)
RBC # FLD: 13 % — SIGNIFICANT CHANGE UP (ref 10.3–14.5)
WBC # BLD: 6.03 K/UL — SIGNIFICANT CHANGE UP (ref 3.8–10.5)
WBC # FLD AUTO: 6.03 K/UL — SIGNIFICANT CHANGE UP (ref 3.8–10.5)

## 2021-03-10 PROCEDURE — 99214 OFFICE O/P EST MOD 30 MIN: CPT

## 2021-03-10 PROCEDURE — 99072 ADDL SUPL MATRL&STAF TM PHE: CPT

## 2021-03-11 ENCOUNTER — NON-APPOINTMENT (OUTPATIENT)
Age: 56
End: 2021-03-11

## 2021-03-11 LAB
25(OH)D3 SERPL-MCNC: 47.5 NG/ML
ALBUMIN SERPL ELPH-MCNC: 4.2 G/DL
ALP BLD-CCNC: 50 U/L
ALT SERPL-CCNC: 16 U/L
ANION GAP SERPL CALC-SCNC: 7 MMOL/L
AST SERPL-CCNC: 19 U/L
BILIRUB SERPL-MCNC: 0.4 MG/DL
BUN SERPL-MCNC: 12 MG/DL
CALCIUM SERPL-MCNC: 9.3 MG/DL
CHLORIDE SERPL-SCNC: 105 MMOL/L
CO2 SERPL-SCNC: 29 MMOL/L
CREAT SERPL-MCNC: 0.85 MG/DL
ESTRADIOL SERPL-MCNC: <5 PG/ML
FSH SERPL-MCNC: 27.9 IU/L
GLUCOSE SERPL-MCNC: 92 MG/DL
POTASSIUM SERPL-SCNC: 4.3 MMOL/L
PROT SERPL-MCNC: 6.6 G/DL
SODIUM SERPL-SCNC: 141 MMOL/L
T3FREE SERPL-MCNC: 2.94 PG/ML
T4 FREE SERPL-MCNC: 1.3 NG/DL
TSH SERPL-ACNC: 3.51 UIU/ML

## 2021-03-11 NOTE — ASSESSMENT
[Curative] : Goals of care discussed with patient: Curative [FreeTextEntry1] : This is a 55-year-old premenopausal lady who is diagnosed with stage IA (T1b N0) left breast well-differentiated invasive ductal carcinoma ER positive, [place] positive and HER-2/geraldo negative . She underwent bilateral nipple sparing mastectomies and is healing well. Oncotype DX score is 14 which predicts 9% risk of distant recurrence after 5 years of tamoxifen. Her medical h/o is significant for treated HCV with undetectable viral load. Started Ann  February 16,2018\par \par - Breast ca: SONAL. She is tolerating tamoxifen very well without significant side effects. Reports good compliance. Plan to continue tamoxifen for 10 years. Will switch to AI after menopause. Annual gyn and opthal f/u. No breast imaging needed. I will check CBC, CMP and vitamin D today. FSH and estradiol checked - results pending\par - Mild tolerable hot flashes- advised to wear layers and use fan as needed \par - Irregular periods: She will followup with her gynecologist every 6-12 months and report any unusual vaginal bleeding or spotting. Continue contraception with condoms. 8/2020, thickened endometrium. s/P bx. She had a polyp and is waiting for surgery after second vaccine. \par -Hepatitis C Cirrhosis: Has mild thrombocytopenia 2/2 cirrhosis. LFT and AF normal\par - I educated her about signs and symptoms of DVT/PE. Patient will report if she develops acute onset chest pain shortness of breath, leg swelling or calf pain. \par - Life style modifications, healthy diet and exercise d/w her\par - Low Vitamin D: concern for worsening bone loss due to low vit D. Discussed to increase Vit D intake \par - She reports hair loss. she saw derm, started biotin which is helping. Noted to have high TSH but normal T4. T3 not chcekd. Discussed to repeat TSH\par \par \par RTC 6m. \par \par addendum\par FSH/E menopausal range\par TSH/FT3/FT4\par She isnt eager to change from tamoxifen to AI as she is concerned about bone s/e\par Will recheck E/FSH next visit. \par

## 2021-03-11 NOTE — PHYSICAL EXAM
[Fully active, able to carry on all pre-disease performance without restriction] : Status 0 - Fully active, able to carry on all pre-disease performance without restriction [Normal] : affect appropriate [de-identified] : b/ nipple sparing mastectomies with reconstruction. No CW or axillary nodules [de-identified] : healed MAGALYS flap.

## 2021-03-11 NOTE — HISTORY OF PRESENT ILLNESS
[Treatment Protocol] : Treatment Protocol [1 - Distress Level] : Distress Level: 1 [Patient given social work contact information and resource sheet] : Patient was given social work contact information and resource sheet [IA] : IA [de-identified] : Ms. TOMER GOMEZ is a 55 year old female here for an evaluation of breast cancer. Her oncologic history is as follows:\par \par She underwent a routine screening mammogram on 11/1/17 which showed an indeterminate 0.6 cm mass in the left breast. She underwent left breast biopsy on 11/15/17 which showed invasive well-differentiated ductal carcinoma, Erlanger score 4/9, with low-grade DCIS. ER/DC 90%, HER-2/geraldo negative.\par \par She underwent bilateral mastectomies and bilateral sentinel axillary lymph nodes on 12/26/17. Pathology from the right side showed radial scar and lymph nodes were negative. Pathology from the left side showed invasive ductal carcinoma, well-differentiated, 0.7 cm, with low-grade DCIS. Margins were negative. 2 sentinel lymph nodes were negative. Oncotype DX recurrence score was 14 [de-identified] : \par GENETICS: GENE DX VUS in PALB2 [FreeTextEntry1] : started Tamoxifen February 16,2018 [de-identified] : Ms. TOMER GOMEZ is here for a follow up visit today for left breast cancer on endocrine therapy since February 16,2018.\par She is tolerating tamoxifen well with  + mild tolerable hot flashes. good compliance. No mood changes, wt gain, vaginal dryness, SOB, chest pain, Leg swelling or clotting issues.\par LMP date 5/2019, periods are irregular since starting tamoxifen , no intermittent spotting, no vaginal discharge. Using contraception condoms. Energy and appetite good, wt stable. Working full time from home. She reports hair loss. she saw derm, started biotin which is helping. Noted to have high TSH but normal T4. T3 not chcekd. Discussed to repeat TSH\par GYN -  Dr Zulema Del Cid, last seen  8/2020, thickened endometrium. s/P bx. She had a polyp and is waiting for surgery after second vaccine. \par Opthal 5/2019\par no need for breast imaging. \par Has Hepatitis C which is stable no issues. Saw GI doctor Emma Mojica in May.\par

## 2021-03-11 NOTE — CONSULT LETTER
[Dear  ___] : Dear  [unfilled], [Consult Letter:] : I had the pleasure of evaluating your patient, [unfilled]. [Please see my note below.] : Please see my note below. [Consult Closing:] : Thank you very much for allowing me to participate in the care of this patient.  If you have any questions, please do not hesitate to contact me. [Sincerely,] : Sincerely, [DrDayami  ___] : Dr. BOWLING [DrDayami ___] : Dr. BOWLING [FreeTextEntry3] : Tayler Ellis M.D.\par  of Medicine\par [facility]\par [facility] [place]\par [address] [place] [address]\par [address]\par Tele # [phone]; Fax [phone]

## 2021-03-11 NOTE — REASON FOR VISIT
[Follow-Up Visit] : a follow-up [Other: _____] : [unfilled] [FreeTextEntry2] : Breast ca ER/[place] POSITIVE, HER-2 NEGATIVE

## 2021-05-25 ENCOUNTER — APPOINTMENT (OUTPATIENT)
Dept: HEPATOLOGY | Facility: CLINIC | Age: 56
End: 2021-05-25
Payer: COMMERCIAL

## 2021-05-25 VITALS
HEART RATE: 83 BPM | WEIGHT: 138 LBS | HEIGHT: 62 IN | SYSTOLIC BLOOD PRESSURE: 121 MMHG | RESPIRATION RATE: 16 BRPM | BODY MASS INDEX: 25.4 KG/M2 | TEMPERATURE: 97.7 F | DIASTOLIC BLOOD PRESSURE: 81 MMHG

## 2021-05-25 LAB
AFP-TM SERPL-MCNC: <1.8 NG/ML
ALBUMIN SERPL ELPH-MCNC: 4.3 G/DL
ALP BLD-CCNC: 52 U/L
ALT SERPL-CCNC: 18 U/L
ANION GAP SERPL CALC-SCNC: 12 MMOL/L
AST SERPL-CCNC: 19 U/L
BASOPHILS # BLD AUTO: 0.07 K/UL
BASOPHILS NFR BLD AUTO: 1.3 %
BILIRUB SERPL-MCNC: 0.5 MG/DL
BUN SERPL-MCNC: 9 MG/DL
CALCIUM SERPL-MCNC: 9.3 MG/DL
CHLORIDE SERPL-SCNC: 105 MMOL/L
CO2 SERPL-SCNC: 24 MMOL/L
COVID-19 SPIKE DOMAIN ANTIBODY INTERPRETATION: POSITIVE
CREAT SERPL-MCNC: 0.73 MG/DL
EOSINOPHIL # BLD AUTO: 0.09 K/UL
EOSINOPHIL NFR BLD AUTO: 1.6 %
HCT VFR BLD CALC: 43.3 %
HGB BLD-MCNC: 13.7 G/DL
IMM GRANULOCYTES NFR BLD AUTO: 0.2 %
INR PPP: 1.1 RATIO
LYMPHOCYTES # BLD AUTO: 1.82 K/UL
LYMPHOCYTES NFR BLD AUTO: 33 %
MAN DIFF?: NORMAL
MCHC RBC-ENTMCNC: 29.6 PG
MCHC RBC-ENTMCNC: 31.6 GM/DL
MCV RBC AUTO: 93.5 FL
MONOCYTES # BLD AUTO: 0.5 K/UL
MONOCYTES NFR BLD AUTO: 9.1 %
NEUTROPHILS # BLD AUTO: 3.02 K/UL
NEUTROPHILS NFR BLD AUTO: 54.8 %
PLATELET # BLD AUTO: 171 K/UL
POTASSIUM SERPL-SCNC: 4.1 MMOL/L
PROT SERPL-MCNC: 6.7 G/DL
PT BLD: 12.9 SEC
RBC # BLD: 4.63 M/UL
RBC # FLD: 12.9 %
SARS-COV-2 AB SERPL IA-ACNC: >250 U/ML
SODIUM SERPL-SCNC: 141 MMOL/L
WBC # FLD AUTO: 5.51 K/UL

## 2021-05-25 PROCEDURE — 99072 ADDL SUPL MATRL&STAF TM PHE: CPT

## 2021-05-25 PROCEDURE — 99215 OFFICE O/P EST HI 40 MIN: CPT

## 2021-05-25 NOTE — ASSESSMENT
[FreeTextEntry1] : Ms. 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 US ab on 01/19/21 shows Cirrhotic liver. There is a bilobed cyst measuring 2.3 x 1.2 x 1.4 cm, unchanged. No other focal hepatic lesion is seen. Liver biopsy at [facility] [facility] in 2010 showing stage 3-4 disease. Fibroscan May 2019 showed F0-F1 which is an improvement from F3-F4.\par Educated the 4 hours of strict NPO prior to procedure, to avoid the body entering into gastric phase giving the Fibroscan the artifact like the stiffness of liver. Which can alter the stiffness score. \par \par # EGD - From December 22, 2014 no varices. \par # Thrombocytopenia - Reviewed the Labs done on 03/10/2021 shows WDL- CBC with  trending up since 11/24/2020.\par \par # H/O Hepatitis CV - Genotype 1B and F3-F4 disease on liver biopsy in 2010. She completed Harvoni on 6/16/15. She is a sustained virologic responder. Advised to call back to discuss the recent labs and US results as ordered.\par TOMER was educated about the meaning of sustained virological response. Explained that hepatitis C antibody is present for life. Reviewed that the patient should not donate blood because of the positive antibody and the antibody is not protective and hepatitis C infection can be acquired again if exposed. Taught back the risk factors for acquiring hepatitis C.\par \par # Colonoscopy from 7/14/17 entire examined colon was normal, she has internal hemorrhoids. No family history of colon cancer. She will repeat in 10 years. \par \par # Immunity - Completed hepatitis B vaccination in Nov 2015. She did not respond to standard Hep B vaccination and was given for 3 dose series every month that was completed on 9/12/17. Blood test from May 2018 confirmed immunity. Immune to HAV as per 12/18/2014 serology. COVID 2 dose Pfizer vaccine completed in March 2021.\par \par PLAN to continue to follow her every 6 months with laboratory studies, abdominal imaging and Fibroscan with RPA.\par Encouraged to call back in the interim with any issues or concerns so that we can address and assist as required.\par

## 2021-05-25 NOTE — HISTORY OF PRESENT ILLNESS
[de-identified] : Ms. 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 She feels well. She has no abdominal pain or changes in bowel habits. \par \par MH/O breast cancer S/p bilateral mastectomy in Dec 2017. She is now taking Tamoxifen. \par Pertinent History: She started Harvoni on 3/24/15 and completed on 6/16/15. She achieved sustained virologic response. She was diagnosed with hepatitis C in 2000. She underwent a liver biopsy at [facility] in 2010 showing stage 3-4 disease. Her contributing risk factor likely to be medical injections while in school in the [place]. \par \par Immunity: Completed hepatitis B vaccination in Nov 2015. She did not respond to standard Hep B vaccination and was given for 3 dose series every month that was completed on 9/12/17. Blood test from May 2018 confirmed immunity. Immune to HAV as per 12/18/2014 serology.\par \par Fibroscan test from 5/17/19 showed F0-F1, S0\par Fibroscan test performed 5/9/17 showed F2, steatosis S0. \par Fibroscan test from 1/6/15 was consistent with F3-4 disease. \par \par EGD from December 22, 2014 no varices. \par Colonoscopy from 7/14/17 entire examined colon was normal, she has internal hemorrhoids. No family history of colon cancer. She will repeat in 10 years. \par \par Labs done on 03/10/2021 shows WDL-CBC with  and CMP (since 2017 recorded). US ab on 01/19/21 shows Cirrhotic liver. There is a bilobed cyst measuring 2.3 x 1.2 x 1.4 cm, unchanged. No other focal hepatic lesion is seen. \par \par Ultrasound from 2/26/18 showed no hepatic lesion. \par \par Blood tests from November 21, 2016 ALT 12, AST 15, total bilirubin 0.5, HCV RNA not detected. Abdominal ultrasound from December 23, 2016 shows no hepatic lesions.\par \par Ab US from Jan 2016 showed no hepatic lesions. \par Abdominal sonogram from June 19, 2015 shows no hepatic lesions. Blood tests from April 20, 2015 platelets 134,000, ALT 18, AST 20, HCV- RNA not detected. \par \par Abdominal ultrasound from December 23, 2014 shows no evidence of hepatic mass.\par \par

## 2021-05-25 NOTE — PHYSICAL EXAM
[Scleral Icterus] : No Scleral Icterus [Hepatojugular Reflux] : patient did not have a sustained hepatojugular reflux [Spider Angioma] : No spider angioma(s) were observed [Splenomegaly] : no splenomegaly [Non-Tender] : non-tender [Asterixis] : no asterixis observed [Jaundice] : No jaundice [Palmar Erythema] : no Palmar Erythema [General Appearance - Alert] : alert [General Appearance - In No Acute Distress] : in no acute distress [Sclera] : the sclera and conjunctiva were normal [Outer Ear] : the ears and nose were normal in appearance [Neck Appearance] : the appearance of the neck was normal [Jugular Venous Distention Increased] : there was no jugular-venous distention [Neck Cervical Mass (___cm)] : no neck mass was observed [Thyroid Diffuse Enlargement] : the thyroid was not enlarged [Thyroid Nodule] : there were no palpable thyroid nodules [Auscultation Breath Sounds / Voice Sounds] : lungs were clear to auscultation bilaterally [Heart Rate And Rhythm] : heart rate was normal and rhythm regular [Heart Sounds] : normal S1 and S2 [Heart Sounds Gallop] : no gallops [Murmurs] : no murmurs [Heart Sounds Pericardial Friction Rub] : no pericardial rub [Edema] : there was no peripheral edema [Bowel Sounds] : normal bowel sounds [Abdomen Soft] : soft [Abdomen Tenderness] : non-tender [Abdomen Mass (___ Cm)] : no abdominal mass palpated [Cervical Lymph Nodes Enlarged Posterior Bilaterally] : posterior cervical [Supraclavicular Lymph Nodes Enlarged Bilaterally] : supraclavicular [No CVA Tenderness] : no ~M costovertebral angle tenderness [No Spinal Tenderness] : no spinal tenderness [Abnormal Walk] : normal gait [Nail Clubbing] : no clubbing  or cyanosis of the fingernails [Musculoskeletal - Swelling] : no joint swelling seen [Skin Color & Pigmentation] : normal skin color and pigmentation [Skin Turgor] : normal skin turgor [] : no rash [Deep Tendon Reflexes (DTR)] : deep tendon reflexes were 2+ and symmetric [Sensation] : the sensory exam was normal to light touch and pinprick [No Focal Deficits] : no focal deficits [Oriented To Time, Place, And Person] : oriented to person, place, and time [Affect] : the affect was normal

## 2021-06-14 ENCOUNTER — OUTPATIENT (OUTPATIENT)
Dept: OUTPATIENT SERVICES | Facility: HOSPITAL | Age: 56
LOS: 1 days | End: 2021-06-14
Payer: COMMERCIAL

## 2021-06-14 ENCOUNTER — APPOINTMENT (OUTPATIENT)
Dept: ULTRASOUND IMAGING | Facility: IMAGING CENTER | Age: 56
End: 2021-06-14
Payer: COMMERCIAL

## 2021-06-14 DIAGNOSIS — M12.9 ARTHROPATHY, UNSPECIFIED: Chronic | ICD-10-CM

## 2021-06-14 DIAGNOSIS — O00.90 UNSPECIFIED ECTOPIC PREGNANCY WITHOUT INTRAUTERINE PREGNANCY: Chronic | ICD-10-CM

## 2021-06-14 DIAGNOSIS — Z90.13 ACQUIRED ABSENCE OF BILATERAL BREASTS AND NIPPLES: Chronic | ICD-10-CM

## 2021-06-14 DIAGNOSIS — Z86.19 PERSONAL HISTORY OF OTHER INFECTIOUS AND PARASITIC DISEASES: ICD-10-CM

## 2021-06-14 DIAGNOSIS — Z33.2 ENCOUNTER FOR ELECTIVE TERMINATION OF PREGNANCY: Chronic | ICD-10-CM

## 2021-06-14 PROCEDURE — 76700 US EXAM ABDOM COMPLETE: CPT | Mod: 26

## 2021-06-14 PROCEDURE — 76700 US EXAM ABDOM COMPLETE: CPT

## 2021-08-19 NOTE — H&P PST ADULT - FALLEN IN THE PAST
Brief Postoperative Note      Patient: Moira Henriquez.   YOB: 1999  MRN: [id number]   CSN: [id number]     Date of Procedure: 8/19/2021    Pre-Op Diagnosis:   - multiple lacerations to right forearm  - suspected right forearm tendon lacerations  - right wrist ulnar neurovascular injury  - right wrist traumatic arthrotomy    Post-Op Diagnosis:   - right FCU laceration  - right ECU laceration  - right ulnar vein laceration  - right wrist traumatic arthrotomy  - 12 x 2 right volar forearm laceration  - 5 x 2 right dorsal forearm laceration  - 5.5 x 2 right dorsal forearm laceration  - 3 x 1 right ulnar forearm laceration       Procedure(s):  - Repair of right FCU tendon; CPT 39151  - Repair of right ECU tendon; CPT 88735  - Right wrist arthrotomy and debridement; CPT 83328  - Debridement 12 x 2 right volar forearm laceration, skin and subq: first 20 cm2; CPT 16566  - Debridement 12 x 2 right volar forearm laceration, skin and subq; each additional 20 cm2; CPT 81138  - Debridement 5 x 2 right dorsal forearm laceration, skin and subq; CPT 28707  - Debridement 5.5 x 2 right dorsal forearm laceration, skin and subq; CPT 41615  - Debridement 3 x 1 right ulnar forearm laceration, skin and subq; CPT 66141    Surgeon(s):  Arleen Sams DO    Assistant:  Resident: Giovanni Keyes DO; Lisa Jansen DO    Anesthesia: General    Estimated Blood Loss (mL): 50 cc    IVF: 500 cc crystalloid    Complications: None    Specimens:   * No specimens in log *    Implants:  * No implants in log *      Drains: * No LDAs found *    Findings: Right ECU and FCU tendon lacs, ulnar v lac, multiple skin/subq lacs; see op note    Electronically signed by Arleen Sams DO on 8/19/2021 at 6:39 PM no

## 2021-09-22 ENCOUNTER — APPOINTMENT (OUTPATIENT)
Dept: SURGERY | Facility: CLINIC | Age: 56
End: 2021-09-22
Payer: COMMERCIAL

## 2021-09-22 ENCOUNTER — OUTPATIENT (OUTPATIENT)
Dept: OUTPATIENT SERVICES | Facility: HOSPITAL | Age: 56
LOS: 1 days | Discharge: HOME | End: 2021-09-22

## 2021-09-22 DIAGNOSIS — Z90.13 ACQUIRED ABSENCE OF BILATERAL BREASTS AND NIPPLES: Chronic | ICD-10-CM

## 2021-09-22 DIAGNOSIS — M12.9 ARTHROPATHY, UNSPECIFIED: Chronic | ICD-10-CM

## 2021-09-22 DIAGNOSIS — Z33.2 ENCOUNTER FOR ELECTIVE TERMINATION OF PREGNANCY: Chronic | ICD-10-CM

## 2021-09-22 DIAGNOSIS — O00.90 UNSPECIFIED ECTOPIC PREGNANCY WITHOUT INTRAUTERINE PREGNANCY: Chronic | ICD-10-CM

## 2021-09-22 DIAGNOSIS — C50.919 MALIGNANT NEOPLASM OF UNSPECIFIED SITE OF UNSPECIFIED FEMALE BREAST: ICD-10-CM

## 2021-09-22 PROCEDURE — 99213K: CUSTOM

## 2021-09-24 ENCOUNTER — APPOINTMENT (OUTPATIENT)
Dept: HEMATOLOGY ONCOLOGY | Facility: CLINIC | Age: 56
End: 2021-09-24
Payer: COMMERCIAL

## 2021-09-24 ENCOUNTER — RESULT REVIEW (OUTPATIENT)
Age: 56
End: 2021-09-24

## 2021-09-24 VITALS
BODY MASS INDEX: 26.09 KG/M2 | TEMPERATURE: 97.9 F | HEART RATE: 67 BPM | RESPIRATION RATE: 18 BRPM | WEIGHT: 141.76 LBS | HEIGHT: 61.89 IN | OXYGEN SATURATION: 97 % | SYSTOLIC BLOOD PRESSURE: 101 MMHG | DIASTOLIC BLOOD PRESSURE: 71 MMHG

## 2021-09-24 DIAGNOSIS — N92.6 IRREGULAR MENSTRUATION, UNSPECIFIED: ICD-10-CM

## 2021-09-24 LAB
BASOPHILS # BLD AUTO: 0.06 K/UL — SIGNIFICANT CHANGE UP (ref 0–0.2)
BASOPHILS NFR BLD AUTO: 1.2 % — SIGNIFICANT CHANGE UP (ref 0–2)
EOSINOPHIL # BLD AUTO: 0.08 K/UL — SIGNIFICANT CHANGE UP (ref 0–0.5)
EOSINOPHIL NFR BLD AUTO: 1.6 % — SIGNIFICANT CHANGE UP (ref 0–6)
HCT VFR BLD CALC: 42.3 % — SIGNIFICANT CHANGE UP (ref 34.5–45)
HGB BLD-MCNC: 13.7 G/DL — SIGNIFICANT CHANGE UP (ref 11.5–15.5)
IMM GRANULOCYTES NFR BLD AUTO: 0.2 % — SIGNIFICANT CHANGE UP (ref 0–1.5)
LYMPHOCYTES # BLD AUTO: 1.93 K/UL — SIGNIFICANT CHANGE UP (ref 1–3.3)
LYMPHOCYTES # BLD AUTO: 37.5 % — SIGNIFICANT CHANGE UP (ref 13–44)
MCHC RBC-ENTMCNC: 30 PG — SIGNIFICANT CHANGE UP (ref 27–34)
MCHC RBC-ENTMCNC: 32.4 G/DL — SIGNIFICANT CHANGE UP (ref 32–36)
MCV RBC AUTO: 92.6 FL — SIGNIFICANT CHANGE UP (ref 80–100)
MONOCYTES # BLD AUTO: 0.46 K/UL — SIGNIFICANT CHANGE UP (ref 0–0.9)
MONOCYTES NFR BLD AUTO: 8.9 % — SIGNIFICANT CHANGE UP (ref 2–14)
NEUTROPHILS # BLD AUTO: 2.61 K/UL — SIGNIFICANT CHANGE UP (ref 1.8–7.4)
NEUTROPHILS NFR BLD AUTO: 50.6 % — SIGNIFICANT CHANGE UP (ref 43–77)
NRBC # BLD: 0 /100 WBCS — SIGNIFICANT CHANGE UP (ref 0–0)
PLATELET # BLD AUTO: 151 K/UL — SIGNIFICANT CHANGE UP (ref 150–400)
RBC # BLD: 4.57 M/UL — SIGNIFICANT CHANGE UP (ref 3.8–5.2)
RBC # FLD: 12.9 % — SIGNIFICANT CHANGE UP (ref 10.3–14.5)
WBC # BLD: 5.15 K/UL — SIGNIFICANT CHANGE UP (ref 3.8–10.5)
WBC # FLD AUTO: 5.15 K/UL — SIGNIFICANT CHANGE UP (ref 3.8–10.5)

## 2021-09-24 PROCEDURE — 99214 OFFICE O/P EST MOD 30 MIN: CPT

## 2021-09-28 LAB
25(OH)D3 SERPL-MCNC: 39.7 NG/ML
ALBUMIN SERPL ELPH-MCNC: 4.3 G/DL
ALP BLD-CCNC: 54 U/L
ALT SERPL-CCNC: 15 U/L
ANION GAP SERPL CALC-SCNC: 10 MMOL/L
AST SERPL-CCNC: 18 U/L
BILIRUB SERPL-MCNC: 0.5 MG/DL
BUN SERPL-MCNC: 16 MG/DL
CALCIUM SERPL-MCNC: 9.5 MG/DL
CHLORIDE SERPL-SCNC: 105 MMOL/L
CO2 SERPL-SCNC: 24 MMOL/L
CREAT SERPL-MCNC: 0.7 MG/DL
ESTRADIOL SERPL-MCNC: <5 PG/ML
FSH SERPL-MCNC: 26.7 IU/L
GLUCOSE SERPL-MCNC: 95 MG/DL
POTASSIUM SERPL-SCNC: 4.4 MMOL/L
PROT SERPL-MCNC: 6.6 G/DL
SODIUM SERPL-SCNC: 139 MMOL/L

## 2021-09-28 NOTE — CONSULT LETTER
[Dear  ___] : Dear  [unfilled], [Consult Letter:] : I had the pleasure of evaluating your patient, [unfilled]. [Please see my note below.] : Please see my note below. [Consult Closing:] : Thank you very much for allowing me to participate in the care of this patient.  If you have any questions, please do not hesitate to contact me. [Sincerely,] : Sincerely, [DrDayami  ___] : Dr. BOWLING [DrDayami ___] : Dr. BOWLING [FreeTextEntry3] : Tayler Ellis M.D.\par  of Medicine\par [facility]\par [facility]\par [address] [place] [address]\par [address]\par Tele # [phone]; Fax [phone]

## 2021-09-28 NOTE — REASON FOR VISIT
[Follow-Up Visit] : a follow-up [Other: _____] : [unfilled] [FreeTextEntry2] : Breast ca ER/MT POSITIVE, HER-2 NEGATIVE

## 2021-09-28 NOTE — HISTORY OF PRESENT ILLNESS
[Treatment Protocol] : Treatment Protocol [1 - Distress Level] : Distress Level: 1 [Patient given social work contact information and resource sheet] : Patient was given social work contact information and resource sheet [IA] : IA [de-identified] : Ms. TOMER GOMEZ is a 55 year old female here for an evaluation of breast cancer. Her oncologic history is as follows:\par \par She underwent a routine screening mammogram on 11/1/17 which showed an indeterminate 0.6 cm mass in the left breast. She underwent left breast biopsy on 11/15/17 which showed invasive well-differentiated ductal carcinoma, Dacoma score 4/9, with low-grade DCIS. ER/HI 90%, HER-2/geraldo negative.\par \par She underwent bilateral mastectomies and bilateral sentinel axillary lymph nodes on 12/26/17. Pathology from the right side showed radial scar and lymph nodes were negative. Pathology from the left side showed invasive ductal carcinoma, well-differentiated, 0.7 cm, with low-grade DCIS. Margins were negative. 2 sentinel lymph nodes were negative. Oncotype DX recurrence score was 14 [de-identified] : \par GENETICS: GENE DX VUS in PALB2 [FreeTextEntry1] : started Tamoxifen February 16,2018 [de-identified] : Ms. TOMER GOMEZ is here for a follow up visit today for left breast cancer on endocrine therapy since February 16,2018.\par She is tolerating tamoxifen well with  + mild tolerable hot flashes. good compliance. No mood changes, wt gain, vaginal dryness, SOB, chest pain, Leg swelling or clotting issues.\par LMP date 5/2019, periods are irregular since starting tamoxifen , no intermittent spotting, no vaginal discharge. Using contraception condoms. Energy and appetite good, wt stable. Working full time from home but leaving job due to stress.  She reports hair loss. she saw derm, started biotin which is helping. TSh ok\par GYN -  Dr Zulema Del Cid, last seen  8/2021, thickened endometrium. s/P bx. She had a polyp and is waiting for surgery after second vaccine. \par Opthal 5/2019\par no need for breast imaging. \par Has Hepatitis C which is stable no issues. Saw GI doctor Emma Mojica in May.\par Her mom passed 5/2021, had liver cancer\par Patient has not had a menstrual period in 2 years.  Chemically she is postmenopausal.  We discussed that she has been on tamoxifen for for 3-1/2 years.  At this point I recommend to switch to aromatase inhibitors.  Typically Arimidex 1 mg daily is used.  Side effects of aromatase inhibitors were reviewed.  Patient was very concerned about bone related side effects.  She reports her aunt had severe complications with Arimidex and her friend was put on bone infusions.  Her aunt suffered with persistent pain despite stopping Arimidex.  We discussed that she is experiencing endometrial issues and has pre-existing hepatic issues and therefore my strong recommendation is to switch tamoxifen to Arimidex at this point but patient was very nervous about making the switch.  We will continue tamoxifen for now and will address this again at the next visit.  \par

## 2021-09-28 NOTE — ASSESSMENT
[Curative] : Goals of care discussed with patient: Curative [FreeTextEntry1] : This is a 55-year-old premenopausal lady who is diagnosed with stage IA (T1b N0) left breast well-differentiated invasive ductal carcinoma ER positive, [place] positive and HER-2/geraldo negative . She underwent bilateral nipple sparing mastectomies and is healing well. Oncotype DX score is 14 which predicts 9% risk of distant recurrence after 5 years of tamoxifen. Her medical h/o is significant for treated HCV with undetectable viral load. Started Ann  February 16,2018\par \par - Breast ca: SONAL. She is tolerating tamoxifen very well without significant side effects. \par \par Patient has not had a menstrual period in 2 years.  Chemically she is postmenopausal.  We discussed that she has been on tamoxifen for for 3-1/2 years.  At this point I recommend to switch to aromatase inhibitors.  Typically Arimidex 1 mg daily is used.  Side effects of aromatase inhibitors were reviewed.  Patient was very concerned about bone related side effects.  She reports her aunt had severe complications with Arimidex and her friend was put on bone infusions.  Her aunt suffered with persistent pain despite stopping Arimidex.  We discussed that she is experiencing endometrial issues and has pre-existing hepatic issues and therefore my strong recommendation is to switch tamoxifen to Arimidex at this point but patient was very nervous about making the switch.  We will continue tamoxifen for now and will address this again at the next visit.  \par \par Will obtain baseline dexa in the meantime \par Annual gyn and opthal f/u. No breast imaging needed. I will check CBC, CMP and vitamin D today. FSH and estradiol checked - she is in menopausal range but doesn’t want to switch to AI as she is worried about bone s/e. \par - Mild tolerable hot flashes- advised to wear layers and use fan as needed \par - Irregular periods: She will followup with her gynecologist every 6-12 months and report any unusual vaginal bleeding or spotting. Continue contraception with condoms. 8/2020, thickened endometrium. s/P bx.\par -Hepatitis C Cirrhosis: Has mild thrombocytopenia 2/2 cirrhosis. LFT and AF normal\par - I educated her about signs and symptoms of DVT/PE. Patient will report if she develops acute onset chest pain shortness of breath, leg swelling or calf pain. \par - Life style modifications, healthy diet and exercise d/w her\par - Low Vitamin D: concern for worsening bone loss due to low vit D. Discussed to increase Vit D intake \par - She reports hair loss. she saw derm, started biotin which is helping. \par \par \par RTC 6m. \par \par

## 2021-09-28 NOTE — PHYSICAL EXAM
[Fully active, able to carry on all pre-disease performance without restriction] : Status 0 - Fully active, able to carry on all pre-disease performance without restriction [Normal] : affect appropriate [de-identified] : b/ nipple sparing mastectomies with reconstruction. No CW or axillary nodules [de-identified] : healed MAGALYS flap.

## 2021-10-11 ENCOUNTER — APPOINTMENT (OUTPATIENT)
Dept: RADIOLOGY | Facility: IMAGING CENTER | Age: 56
End: 2021-10-11
Payer: COMMERCIAL

## 2021-10-11 ENCOUNTER — OUTPATIENT (OUTPATIENT)
Dept: OUTPATIENT SERVICES | Facility: HOSPITAL | Age: 56
LOS: 1 days | End: 2021-10-11
Payer: COMMERCIAL

## 2021-10-11 DIAGNOSIS — M12.9 ARTHROPATHY, UNSPECIFIED: Chronic | ICD-10-CM

## 2021-10-11 DIAGNOSIS — Z33.2 ENCOUNTER FOR ELECTIVE TERMINATION OF PREGNANCY: Chronic | ICD-10-CM

## 2021-10-11 DIAGNOSIS — O00.90 UNSPECIFIED ECTOPIC PREGNANCY WITHOUT INTRAUTERINE PREGNANCY: Chronic | ICD-10-CM

## 2021-10-11 DIAGNOSIS — Z90.13 ACQUIRED ABSENCE OF BILATERAL BREASTS AND NIPPLES: Chronic | ICD-10-CM

## 2021-10-11 DIAGNOSIS — C50.919 MALIGNANT NEOPLASM OF UNSPECIFIED SITE OF UNSPECIFIED FEMALE BREAST: ICD-10-CM

## 2021-10-11 PROCEDURE — 77080 DXA BONE DENSITY AXIAL: CPT

## 2021-10-11 PROCEDURE — 77080 DXA BONE DENSITY AXIAL: CPT | Mod: 26

## 2021-10-14 RX ORDER — TAMOXIFEN CITRATE 20 MG/1
20 TABLET, FILM COATED ORAL DAILY
Qty: 1 | Refills: 1 | Status: DISCONTINUED | COMMUNITY
Start: 2018-07-11 | End: 2021-10-14

## 2021-10-19 ENCOUNTER — APPOINTMENT (OUTPATIENT)
Dept: OPHTHALMOLOGY | Facility: CLINIC | Age: 56
End: 2021-10-19
Payer: COMMERCIAL

## 2021-10-19 ENCOUNTER — NON-APPOINTMENT (OUTPATIENT)
Age: 56
End: 2021-10-19

## 2021-10-19 PROCEDURE — 92134 CPTRZ OPH DX IMG PST SGM RTA: CPT

## 2021-10-19 PROCEDURE — 92014 COMPRE OPH EXAM EST PT 1/>: CPT

## 2021-11-26 ENCOUNTER — APPOINTMENT (OUTPATIENT)
Dept: HEPATOLOGY | Facility: CLINIC | Age: 56
End: 2021-11-26
Payer: COMMERCIAL

## 2021-11-26 VITALS
SYSTOLIC BLOOD PRESSURE: 109 MMHG | OXYGEN SATURATION: 98 % | BODY MASS INDEX: 26.81 KG/M2 | TEMPERATURE: 97.9 F | HEIGHT: 61 IN | HEART RATE: 71 BPM | WEIGHT: 142 LBS | RESPIRATION RATE: 17 BRPM | DIASTOLIC BLOOD PRESSURE: 77 MMHG

## 2021-11-26 PROCEDURE — 99215 OFFICE O/P EST HI 40 MIN: CPT

## 2021-11-26 PROCEDURE — 91200 LIVER ELASTOGRAPHY: CPT

## 2021-11-26 NOTE — HISTORY OF PRESENT ILLNESS
[de-identified] : Fibroscan on 11/26/2021 shows F0-1 (E kPa) and S0 (CAP dB/m). [de-identified] : Ms. 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 She feels well. She has no abdominal pain or changes in bowel habits. \par \par MH/O breast cancer S/p bilateral mastectomy in Dec 2017. She is now taking Tamoxifen. \par Pertinent History: She started Harvoni on 3/24/15 and completed on 6/16/15. She achieved sustained virologic response. She was diagnosed with hepatitis C in 2000. She underwent a liver biopsy at [place] in 2010 showing stage 3-4 disease. Her contributing risk factor likely to be medical injections while in school in the [place]. \par \par Fibroscan on 11/26/2021 shows F0-1 (E 4.5 kPa) and S0 ( dB/m).\par Fibroscan test from 5/17/19 showed F0-F1, S0\par Fibroscan test performed 5/9/17 showed F2, steatosis S0. \par Fibroscan test from 1/6/15 was consistent with F3-4 disease. \par \par Colonoscopy from 7/14/17 entire examined colon was normal, she has internal hemorrhoids. No family history of colon cancer. She will repeat in 10 years. \par EGD from December 22, 2014 no varices. \par \par Immunity: Immune to HAV (12/18/2014) and HBV (May 2018). Completed hepatitis B vaccination in Nov 2015. She did not respond to standard Hep B vaccination and was given for 3 dose series every month that was completed on 9/12/17. Serology confirmed immunity. \par \par \par Labs on 09/24/21 shows WDL-CMP, Vit D, CBC.\par \par US ab on 06/14/21 shows Main portal vein is patent, with normal direction of flow. Exophytic cyst in the right hepatic lobe measures 1.9 x 1.0 x 1.4 cm, corresponding with lesion present on prior CT. No suspicious liver lesions. Labs on 05/25/21 shows WDL- CMP, CBC, AFP, INR, COVID+spike >250.\par \par Labs done on 03/10/2021 shows WDL-CBC with  and CMP (since 2017 recorded). US ab on 01/19/21 shows Cirrhotic liver. There is a bilobed cyst measuring 2.3 x 1.2 x 1.4 cm, unchanged. No other focal hepatic lesion is seen. \par \par Ultrasound from 2/26/18 showed no hepatic lesion. \par \par Blood tests from November 21, 2016 ALT 12, AST 15, total bilirubin 0.5, HCV RNA not detected. Abdominal ultrasound from December 23, 2016 shows no hepatic lesions.\par \par Ab US from Jan 2016 showed no hepatic lesions. \par Abdominal sonogram from June 19, 2015 shows no hepatic lesions. Blood tests from April 20, 2015 platelets 134,000, ALT 18, AST 20, HCV- RNA not detected. \par \par Abdominal ultrasound from December 23, 2014 shows no evidence of hepatic mass.\par

## 2021-11-26 NOTE — PHYSICAL EXAM
[Non-Tender] : non-tender [General Appearance - Alert] : alert [General Appearance - In No Acute Distress] : in no acute distress [Sclera] : the sclera and conjunctiva were normal [Outer Ear] : the ears and nose were normal in appearance [Neck Appearance] : the appearance of the neck was normal [Neck Cervical Mass (___cm)] : no neck mass was observed [Jugular Venous Distention Increased] : there was no jugular-venous distention [Thyroid Diffuse Enlargement] : the thyroid was not enlarged [Thyroid Nodule] : there were no palpable thyroid nodules [Auscultation Breath Sounds / Voice Sounds] : lungs were clear to auscultation bilaterally [Heart Rate And Rhythm] : heart rate was normal and rhythm regular [Heart Sounds] : normal S1 and S2 [Heart Sounds Gallop] : no gallops [Murmurs] : no murmurs [Heart Sounds Pericardial Friction Rub] : no pericardial rub [Edema] : there was no peripheral edema [Bowel Sounds] : normal bowel sounds [Abdomen Soft] : soft [Abdomen Tenderness] : non-tender [Abdomen Mass (___ Cm)] : no abdominal mass palpated [Cervical Lymph Nodes Enlarged Posterior Bilaterally] : posterior cervical [Supraclavicular Lymph Nodes Enlarged Bilaterally] : supraclavicular [No CVA Tenderness] : no ~M costovertebral angle tenderness [No Spinal Tenderness] : no spinal tenderness [Abnormal Walk] : normal gait [Nail Clubbing] : no clubbing  or cyanosis of the fingernails [Musculoskeletal - Swelling] : no joint swelling seen [Skin Color & Pigmentation] : normal skin color and pigmentation [Skin Turgor] : normal skin turgor [] : no rash [Deep Tendon Reflexes (DTR)] : deep tendon reflexes were 2+ and symmetric [Sensation] : the sensory exam was normal to light touch and pinprick [No Focal Deficits] : no focal deficits [Oriented To Time, Place, And Person] : oriented to person, place, and time [Affect] : the affect was normal [Scleral Icterus] : No Scleral Icterus [Hepatojugular Reflux] : patient did not have a sustained hepatojugular reflux [Spider Angioma] : No spider angioma(s) were observed [Splenomegaly] : no splenomegaly [Asterixis] : no asterixis observed [Jaundice] : No jaundice [Palmar Erythema] : no Palmar Erythema

## 2021-11-26 NOTE — ASSESSMENT
[FreeTextEntry1] : Ms. 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 US ab on 01/19/21 shows Cirrhotic liver. There is a bilobed cyst measuring 2.3 x 1.2 x 1.4 cm, unchanged. No other focal hepatic lesion is seen. Liver biopsy at [place] in 2010 showing stage 3-4 disease. Fibroscan May 2019 showed F0-F1 which is an improvement from F3-F4.\par Educated the 4 hours of strict NPO prior to procedure, to avoid the body entering into gastric phase giving the Fibroscan the artifact like the stiffness of liver. Which can alter the stiffness score. \par -   HCC screening: US ab on 06/14/21 shows No suspicious liver lesions.\par -   EGD - From December 22, 2014 no varices. \par -   Thrombocytopenia - Reviewed the Labs on 09/24/21 shows WDL- CBC trending up since 11/24/2020.\par \par # Hepatic cyst - US ab on 06/14/21 shows Exophytic cyst in the right hepatic lobe measures 1.9 x 1.0 x 1.4 cm, corresponding with lesion present on prior CT. Explained the benign nature of the cyst even thou it is bilobed. No intervention needed at this time.\par \par # Fatty Liver - Fibroscan on 11/26/2021 shows F0-1 (E 4.5 kPa) and S0 ( dB/m). Normal weight with BMI of 26.8. Advised to continue the healthy lifestyle and expresses that she was sadden from the recent loss of her mom to cancer and resigned from her Job. Advised to stay off the alcohol use and start with exercise regimen and monitor weight.\par \par # Osteoporosis T score -2.5. Recommended to start prolia. Risks benefits reviewed in detail. Recommend dental evaluation and schedule prolia in next 2 M. She will continue calcium and vitamin D. Switch from tamoxifen to anastrozole \par \par # H/O Hepatitis CV - Genotype 1B and F3-F4 disease on liver biopsy in 2010. She completed Harvoni on 6/16/15. She is a sustained virologic responder. Advised to call back to discuss the recent labs and US results as ordered.\par \par TOMER was educated about the meaning of sustained virological response. Explained that hepatitis C antibody is present for life. Reviewed that the patient should not donate blood because of the positive antibody and the antibody is not protective and hepatitis C infection can be acquired again if exposed. Taught back the risk factors for acquiring hepatitis C.\par \par # Immunity - Immune to HAV (12/18/2014) and HBV (May 2018). Completed hepatitis B vaccination in Nov 2015. She did not respond to standard Hep B vaccination and was given for 3 dose series every month that was completed on 9/12/17. COVID 2 dose Pfizer vaccine completed in March 2021. Serology confirmed immunity.\par \par # Colonoscopy from 7/14/17 entire examined colon was normal, she has internal hemorrhoids. No family history of colon cancer. She will repeat in 10 years. \par \par PLAN to continue to follow her every 6 months with laboratory studies, abdominal imaging prior to RPA.\par Encouraged to call back in the interim with any issues or concerns so that we can address and assist as required.\par

## 2021-11-29 LAB
25(OH)D3 SERPL-MCNC: 38.1 NG/ML
AFP-TM SERPL-MCNC: <1.8 NG/ML
ALBUMIN SERPL ELPH-MCNC: 4.4 G/DL
ALP BLD-CCNC: 57 U/L
ALT SERPL-CCNC: 20 U/L
ANION GAP SERPL CALC-SCNC: 12 MMOL/L
AST SERPL-CCNC: 19 U/L
BASOPHILS # BLD AUTO: 0.06 K/UL
BASOPHILS NFR BLD AUTO: 1.1 %
BILIRUB SERPL-MCNC: 0.5 MG/DL
BUN SERPL-MCNC: 12 MG/DL
CALCIUM SERPL-MCNC: 9.3 MG/DL
CHLORIDE SERPL-SCNC: 105 MMOL/L
CHOLEST SERPL-MCNC: 176 MG/DL
CO2 SERPL-SCNC: 24 MMOL/L
COVID-19 SPIKE DOMAIN ANTIBODY INTERPRETATION: POSITIVE
CREAT SERPL-MCNC: 0.74 MG/DL
EOSINOPHIL # BLD AUTO: 0.18 K/UL
EOSINOPHIL NFR BLD AUTO: 3.2 %
ESTIMATED AVERAGE GLUCOSE: 105 MG/DL
FERRITIN SERPL-MCNC: 48 NG/ML
HBA1C MFR BLD HPLC: 5.3 %
HCT VFR BLD CALC: 41.4 %
HDLC SERPL-MCNC: 47 MG/DL
HGB BLD-MCNC: 13.6 G/DL
IMM GRANULOCYTES NFR BLD AUTO: 0.2 %
INR PPP: 1.04 RATIO
IRON SATN MFR SERPL: 29 %
IRON SERPL-MCNC: 96 UG/DL
LDLC SERPL CALC-MCNC: 112 MG/DL
LYMPHOCYTES # BLD AUTO: 1.91 K/UL
LYMPHOCYTES NFR BLD AUTO: 33.9 %
MAN DIFF?: NORMAL
MCHC RBC-ENTMCNC: 30.2 PG
MCHC RBC-ENTMCNC: 32.9 GM/DL
MCV RBC AUTO: 91.8 FL
MONOCYTES # BLD AUTO: 0.55 K/UL
MONOCYTES NFR BLD AUTO: 9.8 %
NEUTROPHILS # BLD AUTO: 2.92 K/UL
NEUTROPHILS NFR BLD AUTO: 51.8 %
NONHDLC SERPL-MCNC: 129 MG/DL
PLATELET # BLD AUTO: 157 K/UL
POTASSIUM SERPL-SCNC: 4.1 MMOL/L
PROT SERPL-MCNC: 6.7 G/DL
PT BLD: 12.2 SEC
RBC # BLD: 4.51 M/UL
RBC # FLD: 13.5 %
SARS-COV-2 AB SERPL IA-ACNC: >250 U/ML
SODIUM SERPL-SCNC: 141 MMOL/L
TIBC SERPL-MCNC: 329 UG/DL
TRIGL SERPL-MCNC: 85 MG/DL
UIBC SERPL-MCNC: 233 UG/DL
WBC # FLD AUTO: 5.63 K/UL

## 2021-12-09 ENCOUNTER — OUTPATIENT (OUTPATIENT)
Dept: OUTPATIENT SERVICES | Facility: HOSPITAL | Age: 56
LOS: 1 days | Discharge: HOME | End: 2021-12-09

## 2021-12-09 DIAGNOSIS — C50.919 MALIGNANT NEOPLASM OF UNSPECIFIED SITE OF UNSPECIFIED FEMALE BREAST: ICD-10-CM

## 2021-12-09 DIAGNOSIS — Z33.2 ENCOUNTER FOR ELECTIVE TERMINATION OF PREGNANCY: Chronic | ICD-10-CM

## 2021-12-09 DIAGNOSIS — M12.9 ARTHROPATHY, UNSPECIFIED: Chronic | ICD-10-CM

## 2021-12-09 DIAGNOSIS — O00.90 UNSPECIFIED ECTOPIC PREGNANCY WITHOUT INTRAUTERINE PREGNANCY: Chronic | ICD-10-CM

## 2021-12-09 DIAGNOSIS — Z90.13 ACQUIRED ABSENCE OF BILATERAL BREASTS AND NIPPLES: Chronic | ICD-10-CM

## 2021-12-13 ENCOUNTER — APPOINTMENT (OUTPATIENT)
Dept: INFUSION THERAPY | Facility: HOSPITAL | Age: 56
End: 2021-12-13

## 2021-12-13 ENCOUNTER — APPOINTMENT (OUTPATIENT)
Dept: HEMATOLOGY ONCOLOGY | Facility: CLINIC | Age: 56
End: 2021-12-13
Payer: COMMERCIAL

## 2021-12-13 VITALS
BODY MASS INDEX: 27.16 KG/M2 | DIASTOLIC BLOOD PRESSURE: 79 MMHG | RESPIRATION RATE: 17 BRPM | HEART RATE: 69 BPM | OXYGEN SATURATION: 98 % | HEIGHT: 61 IN | TEMPERATURE: 97.2 F | WEIGHT: 143.83 LBS | SYSTOLIC BLOOD PRESSURE: 121 MMHG

## 2021-12-13 DIAGNOSIS — L65.9 NONSCARRING HAIR LOSS, UNSPECIFIED: ICD-10-CM

## 2021-12-13 DIAGNOSIS — C79.51 SECONDARY MALIGNANT NEOPLASM OF BONE: ICD-10-CM

## 2021-12-13 PROCEDURE — 99215 OFFICE O/P EST HI 40 MIN: CPT

## 2021-12-14 RX ORDER — NORTRIPTYLINE HYDROCHLORIDE 10 MG/1
10 CAPSULE ORAL
Qty: 60 | Refills: 0 | Status: DISCONTINUED | COMMUNITY
Start: 2021-10-28

## 2021-12-14 RX ORDER — ESCITALOPRAM OXALATE 10 MG/1
10 TABLET ORAL
Qty: 30 | Refills: 0 | Status: DISCONTINUED | COMMUNITY
Start: 2021-12-01

## 2021-12-14 NOTE — PHYSICAL EXAM
[Fully active, able to carry on all pre-disease performance without restriction] : Status 0 - Fully active, able to carry on all pre-disease performance without restriction [Normal] : affect appropriate [de-identified] : b/l nipple sparing mastectomies with reconstruction. No CW or axillary nodules, [de-identified] : healed MAGALYS flap.

## 2021-12-14 NOTE — ASSESSMENT
[Curative] : Goals of care discussed with patient: Curative [FreeTextEntry1] : This is a 55-year-old premenopausal lady who is diagnosed with stage IA (T1b N0) left breast well-differentiated invasive ductal carcinoma ER positive, [place] positive and HER-2/geraldo negative . She underwent bilateral nipple sparing mastectomies and is healing well. Oncotype DX score is 14 which predicts 9% risk of distant recurrence after 5 years of tamoxifen. Her medical h/o is significant for treated HCV with undetectable viral load. Started Ann  February 16,2018\par \par - Breast ca: SONAL. Initially, She was started on tamoxifen which she tolerated very well without significant side effects. Patient has not had a menstrual period in 2 years.  Chemically she is postmenopausal.  We discussed that she has been on tamoxifen for for 3-1/2 years.  I recommend to switch to aromatase inhibitors.  Typically Arimidex 1 mg daily is used.  Side effects of aromatase inhibitors were reviewed.  Patient was very concerned about bone related side effects.  She reports her aunt had severe complications with Arimidex and her friend was put on bone infusions.  Her aunt suffered with persistent pain despite stopping Arimidex.  We discussed that she is experiencing endometrial issues and has pre-existing hepatic issues and therefore my strong recommendation is to switch tamoxifen to Arimidex at this point but patient was very nervous about making the switch Annual gyn and opthal f/u. No breast imaging needed. I will check CBC, CMP and vitamin D today. FSH and estradiol checked - she is in menopausal range but doesn’t want to switch to AI as she is worried about bone s/e. \par \par Osteoporosis- 10/2021 baseline dexa spine -2.5 osteoporosis.She is starting Prolia today 12/18/21, cleared by the dentist 12/7/21. She is currently on anastrozole which she started 10/2021. \par \par - Mild tolerable hot flashes- advised to wear layers and use fan as needed \par - Irregular periods: She will followup with her gynecologist every 6-12 months and report any unusual vaginal bleeding or spotting. Continue contraception with condoms. 8/2020, thickened endometrium. s/P bx.\par -Hepatitis C Cirrhosis: Has mild thrombocytopenia 2/2 cirrhosis. LFT and AF normal\par - I educated her about signs and symptoms of DVT/PE. Patient will report if she develops acute onset chest pain shortness of breath, leg swelling or calf pain. \par - Life style modifications, healthy diet and exercise d/w her\par - Low Vitamin D: concern for worsening bone loss due to low vit D. Discussed to increase Vit D intake \par - She reports hair loss. she saw derm, started biotin which is helping. She reports being under a lot stress at work so she left her job 10/2021 She is seeing psych for stress and depression from mother's death. \par - 11/26/21 CBC and CMP results reviewed\par \par RTC 6m. \par \par

## 2021-12-14 NOTE — END OF VISIT
[FreeTextEntry3] : Patient being seen as per physician's primary plan of care.\par  [Time Spent: ___ minutes] : I have spent [unfilled] minutes of time on the encounter.

## 2021-12-14 NOTE — REASON FOR VISIT
[Follow-Up Visit] : a follow-up [Other: _____] : [unfilled] [FreeTextEntry2] : Breast ca ER/OR POSITIVE, HER-2 NEGATIVE

## 2021-12-14 NOTE — HISTORY OF PRESENT ILLNESS
[Treatment Protocol] : Treatment Protocol [1 - Distress Level] : Distress Level: 1 [Patient given social work contact information and resource sheet] : Patient was given social work contact information and resource sheet [IA] : IA [de-identified] : Ms. TOMER GOMEZ is a 55 year old female here for an evaluation of breast cancer. Her oncologic history is as follows:\par \par She underwent a routine screening mammogram on 11/1/17 which showed an indeterminate 0.6 cm mass in the left breast. She underwent left breast biopsy on 11/15/17 which showed invasive well-differentiated ductal carcinoma, [place] score 4/9, with low-grade DCIS. ER/LA 90%, HER-2/geraldo negative.\par \par She underwent bilateral mastectomies and bilateral sentinel axillary lymph nodes on 12/26/17. Pathology from the right side showed radial scar and lymph nodes were negative. Pathology from the left side showed invasive ductal carcinoma, well-differentiated, 0.7 cm, with low-grade DCIS. Margins were negative. 2 sentinel lymph nodes were negative. Oncotype DX recurrence score was 14 [de-identified] : \par GENETICS: GENE DX VUS in PALB2 [FreeTextEntry1] : started Tamoxifen February 16,2018 [de-identified] : Ms. TOMER GOMEZ is here for a follow up visit today for left breast cancer on endocrine therapy since February 16,2018.\par \par She is tolerating tamoxifen well with  + mild tolerable hot flashes. good compliance. No mood changes, wt gain, vaginal dryness, SOB, chest pain, Leg swelling or clotting issues.\par LMP date 5/2019, periods are irregular since starting tamoxifen , no intermittent spotting, no vaginal discharge. Using contraception condoms. Energy and appetite good, wt stable. Working full time from home but leaving job due to stress.  She reports hair loss. she saw derm, started biotin which is helping. TSH ok\par GYN -  Dr Zulema Del Cid, last seen  8/2021, thickened endometrium. s/P bx. She had a polyp and is waiting for surgery after second vaccine. \par Opthal 5/2019\par no need for breast imaging. \par Has Hepatitis C which is stable no issues. Saw GI doctor Emma Mojica in May.\par Her mom passed 5/2021, had liver cancer\par Patient has not had a menstrual period in 2 years.  Chemically she is postmenopausal.  We discussed that she has been on tamoxifen for for 3-1/2 years.  At this point I recommend to switch to aromatase inhibitors.  Typically Arimidex 1 mg daily is used.  Side effects of aromatase inhibitors were reviewed.  Patient was very concerned about bone related side effects.  She reports her aunt had severe complications with Arimidex and her friend was put on bone infusions.  Her aunt suffered with persistent pain despite stopping Arimidex.  We discussed that she is experiencing endometrial issues and has pre-existing hepatic issues and therefore my strong recommendation is to switch tamoxifen to Arimidex at this point but patient was very nervous about making the switch.  We will continue tamoxifen for now and will address this again at the next visit.  \par 12/13/2021 Patient is here to start Prolia today 12/18/21, seen and cleared by the dentist 12/7/21. She is currently on anastrozole which she starts 10/2021. She reports hair thinning and weight gain. Denies hot flashes, vag dryness, change in energy or bone pain. She reports being under a lot stress at work so she left her job 10/2021 She is seeing psych for stress and depression from mother's death.

## 2021-12-23 ENCOUNTER — NON-APPOINTMENT (OUTPATIENT)
Age: 56
End: 2021-12-23

## 2021-12-27 ENCOUNTER — OUTPATIENT (OUTPATIENT)
Dept: OUTPATIENT SERVICES | Facility: HOSPITAL | Age: 56
LOS: 1 days | End: 2021-12-27
Payer: COMMERCIAL

## 2021-12-27 ENCOUNTER — APPOINTMENT (OUTPATIENT)
Dept: ULTRASOUND IMAGING | Facility: IMAGING CENTER | Age: 56
End: 2021-12-27
Payer: COMMERCIAL

## 2021-12-27 DIAGNOSIS — M12.9 ARTHROPATHY, UNSPECIFIED: Chronic | ICD-10-CM

## 2021-12-27 DIAGNOSIS — Z33.2 ENCOUNTER FOR ELECTIVE TERMINATION OF PREGNANCY: Chronic | ICD-10-CM

## 2021-12-27 DIAGNOSIS — Z00.8 ENCOUNTER FOR OTHER GENERAL EXAMINATION: ICD-10-CM

## 2021-12-27 DIAGNOSIS — Z90.13 ACQUIRED ABSENCE OF BILATERAL BREASTS AND NIPPLES: Chronic | ICD-10-CM

## 2021-12-27 DIAGNOSIS — O00.90 UNSPECIFIED ECTOPIC PREGNANCY WITHOUT INTRAUTERINE PREGNANCY: Chronic | ICD-10-CM

## 2021-12-27 PROCEDURE — 76700 US EXAM ABDOM COMPLETE: CPT | Mod: 26

## 2021-12-27 PROCEDURE — 76700 US EXAM ABDOM COMPLETE: CPT

## 2021-12-28 ENCOUNTER — NON-APPOINTMENT (OUTPATIENT)
Age: 56
End: 2021-12-28

## 2022-03-23 ENCOUNTER — OUTPATIENT (OUTPATIENT)
Dept: OUTPATIENT SERVICES | Facility: HOSPITAL | Age: 57
LOS: 1 days | Discharge: HOME | End: 2022-03-23

## 2022-03-23 DIAGNOSIS — C50.919 MALIGNANT NEOPLASM OF UNSPECIFIED SITE OF UNSPECIFIED FEMALE BREAST: ICD-10-CM

## 2022-03-23 DIAGNOSIS — M12.9 ARTHROPATHY, UNSPECIFIED: Chronic | ICD-10-CM

## 2022-03-23 DIAGNOSIS — Z33.2 ENCOUNTER FOR ELECTIVE TERMINATION OF PREGNANCY: Chronic | ICD-10-CM

## 2022-03-23 DIAGNOSIS — O00.90 UNSPECIFIED ECTOPIC PREGNANCY WITHOUT INTRAUTERINE PREGNANCY: Chronic | ICD-10-CM

## 2022-03-23 DIAGNOSIS — Z90.13 ACQUIRED ABSENCE OF BILATERAL BREASTS AND NIPPLES: Chronic | ICD-10-CM

## 2022-03-25 ENCOUNTER — RESULT REVIEW (OUTPATIENT)
Age: 57
End: 2022-03-25

## 2022-03-25 ENCOUNTER — APPOINTMENT (OUTPATIENT)
Dept: HEMATOLOGY ONCOLOGY | Facility: CLINIC | Age: 57
End: 2022-03-25
Payer: COMMERCIAL

## 2022-03-25 VITALS
HEIGHT: 61 IN | WEIGHT: 142.31 LBS | RESPIRATION RATE: 16 BRPM | BODY MASS INDEX: 26.87 KG/M2 | TEMPERATURE: 97.3 F | SYSTOLIC BLOOD PRESSURE: 119 MMHG | DIASTOLIC BLOOD PRESSURE: 80 MMHG | HEART RATE: 77 BPM | OXYGEN SATURATION: 98 %

## 2022-03-25 DIAGNOSIS — R79.89 OTHER SPECIFIED ABNORMAL FINDINGS OF BLOOD CHEMISTRY: ICD-10-CM

## 2022-03-25 LAB
BASOPHILS # BLD AUTO: 0.04 K/UL — SIGNIFICANT CHANGE UP (ref 0–0.2)
BASOPHILS NFR BLD AUTO: 0.8 % — SIGNIFICANT CHANGE UP (ref 0–2)
EOSINOPHIL # BLD AUTO: 0.12 K/UL — SIGNIFICANT CHANGE UP (ref 0–0.5)
EOSINOPHIL NFR BLD AUTO: 2.4 % — SIGNIFICANT CHANGE UP (ref 0–6)
HCT VFR BLD CALC: 42 % — SIGNIFICANT CHANGE UP (ref 34.5–45)
HGB BLD-MCNC: 13.6 G/DL — SIGNIFICANT CHANGE UP (ref 11.5–15.5)
IMM GRANULOCYTES NFR BLD AUTO: 0.2 % — SIGNIFICANT CHANGE UP (ref 0–1.5)
LYMPHOCYTES # BLD AUTO: 1.74 K/UL — SIGNIFICANT CHANGE UP (ref 1–3.3)
LYMPHOCYTES # BLD AUTO: 34.5 % — SIGNIFICANT CHANGE UP (ref 13–44)
MCHC RBC-ENTMCNC: 30 PG — SIGNIFICANT CHANGE UP (ref 27–34)
MCHC RBC-ENTMCNC: 32.4 G/DL — SIGNIFICANT CHANGE UP (ref 32–36)
MCV RBC AUTO: 92.5 FL — SIGNIFICANT CHANGE UP (ref 80–100)
MONOCYTES # BLD AUTO: 0.6 K/UL — SIGNIFICANT CHANGE UP (ref 0–0.9)
MONOCYTES NFR BLD AUTO: 11.9 % — SIGNIFICANT CHANGE UP (ref 2–14)
NEUTROPHILS # BLD AUTO: 2.53 K/UL — SIGNIFICANT CHANGE UP (ref 1.8–7.4)
NEUTROPHILS NFR BLD AUTO: 50.2 % — SIGNIFICANT CHANGE UP (ref 43–77)
NRBC # BLD: 0 /100 WBCS — SIGNIFICANT CHANGE UP (ref 0–0)
PLATELET # BLD AUTO: 173 K/UL — SIGNIFICANT CHANGE UP (ref 150–400)
RBC # BLD: 4.54 M/UL — SIGNIFICANT CHANGE UP (ref 3.8–5.2)
RBC # FLD: 13.2 % — SIGNIFICANT CHANGE UP (ref 10.3–14.5)
WBC # BLD: 5.04 K/UL — SIGNIFICANT CHANGE UP (ref 3.8–10.5)
WBC # FLD AUTO: 5.04 K/UL — SIGNIFICANT CHANGE UP (ref 3.8–10.5)

## 2022-03-25 PROCEDURE — 99215 OFFICE O/P EST HI 40 MIN: CPT

## 2022-03-29 LAB
25(OH)D3 SERPL-MCNC: 38.5 NG/ML
ALBUMIN SERPL ELPH-MCNC: 4.5 G/DL
ALP BLD-CCNC: 39 U/L
ALT SERPL-CCNC: 18 U/L
ANION GAP SERPL CALC-SCNC: 11 MMOL/L
AST SERPL-CCNC: 19 U/L
BILIRUB SERPL-MCNC: 0.4 MG/DL
BUN SERPL-MCNC: 13 MG/DL
CALCIUM SERPL-MCNC: 10 MG/DL
CANCER AG27-29 SERPL-ACNC: 15.9 U/ML
CEA SERPL-MCNC: 1.8 NG/ML
CHLORIDE SERPL-SCNC: 106 MMOL/L
CHOLEST SERPL-MCNC: 172 MG/DL
CO2 SERPL-SCNC: 26 MMOL/L
COVID-19 SPIKE DOMAIN ANTIBODY INTERPRETATION: POSITIVE
CREAT SERPL-MCNC: 0.65 MG/DL
EGFR: 104 ML/MIN/1.73M2
GLUCOSE SERPL-MCNC: 94 MG/DL
HDLC SERPL-MCNC: 46 MG/DL
LDLC SERPL CALC-MCNC: 103 MG/DL
NONHDLC SERPL-MCNC: 126 MG/DL
POTASSIUM SERPL-SCNC: 4.1 MMOL/L
PROT SERPL-MCNC: 6.7 G/DL
SARS-COV-2 AB SERPL IA-ACNC: >250 U/ML
SODIUM SERPL-SCNC: 143 MMOL/L
TRIGL SERPL-MCNC: 113 MG/DL

## 2022-03-31 PROBLEM — R79.89 LOW VITAMIN D LEVEL: Status: ACTIVE | Noted: 2020-06-30

## 2022-03-31 NOTE — REASON FOR VISIT
[Follow-Up Visit] : a follow-up [Other: _____] : [unfilled] [FreeTextEntry2] : Breast ca ER/IN POSITIVE, HER-2 NEGATIVE

## 2022-03-31 NOTE — ASSESSMENT
[Curative] : Goals of care discussed with patient: Curative [FreeTextEntry1] : This is a 55-year-old premenopausal lady who is diagnosed with stage IA (T1b N0) left breast well-differentiated invasive ductal carcinoma ER positive, OR positive and HER-2/geraldo negative . She underwent bilateral nipple sparing mastectomies and is healing well. Oncotype DX score is 14 which predicts 9% risk of distant recurrence after 5 years of tamoxifen. Her medical h/o is significant for treated HCV with undetectable viral load. Started Ann  February 16,2018\par \par - Breast ca: Started Ann  February 16,2018. Switched to anastrozole 10/2021. Prolia started 12/2021\par today 3/2022:   \par She reports feeling sad, depressed since her mom passed. Saw psyche recently and started celexa in 11/2021\par She started anastrozole in October 2021. She reports diffuse body pain since December 2021, migraine gotten severe since then, saw neuro and s/p botox x 2 with mild relief\par Hot flashes are worse\par SHe is s/p Prolia 12/2021 for osteoporosis and body pain much worse since then\par She is very tearful and asking for LTD. We discussed unlikely to get LTD on the basis of stage I BREAST CA and meds s/e are transient. She will d/w psychiatrist to get disability based on depression\par We rec to stop anastrozole x 2 weeks\par wait 2 weeks to see if sx improve\par I rec to do bone scan but she wants to hold off\par if pain persist after 2 weeks, will do a bone scan\par If pain resolve- will switch to tamoxifen or exemestane\par SHe is worried about uterine toxicity from tamoxifen and wishes to try exemestane\par teb 2 weeks\par disability paperwork filled out per her request today\par \par Osteoporosis: concern for worsening bone density and risk of fractures due to anastrozole. Rec to  continue calcium and vit D. DEXA 1-2 yrs. Prolia started 12/2021, next dose 6/2022. \par \par - Mild tolerable hot flashes- advised to wear layers and use fan as needed \par .\par -Hepatitis C Cirrhosis: Has mild thrombocytopenia 2/2 cirrhosis. LFT and AF normal\par - Low Vitamin D: concern for worsening bone loss due to low vit D. Discussed to increase Vit D intake \par \par f/u in 2 weeks \par \par

## 2022-03-31 NOTE — CONSULT LETTER
[Dear  ___] : Dear  [unfilled], [Consult Letter:] : I had the pleasure of evaluating your patient, [unfilled]. [Please see my note below.] : Please see my note below. [Consult Closing:] : Thank you very much for allowing me to participate in the care of this patient.  If you have any questions, please do not hesitate to contact me. [Sincerely,] : Sincerely, [DrDayami  ___] : Dr. BOWLING [DrDayami ___] : Dr. BOWLING [FreeTextEntry3] : Tayler Ellis M.D.\par  of Medicine\par [facility]\par [facility] [place]\par [address] [place] [address]\par [address]\par Tele # [phone]; Fax [phone]

## 2022-03-31 NOTE — PHYSICAL EXAM
[Fully active, able to carry on all pre-disease performance without restriction] : Status 0 - Fully active, able to carry on all pre-disease performance without restriction [Normal] : affect appropriate [de-identified] : b/ nipple sparing mastectomies with reconstruction. No CW or axillary nodules [de-identified] : healed MAGALYS flap.

## 2022-03-31 NOTE — HISTORY OF PRESENT ILLNESS
[Treatment Protocol] : Treatment Protocol [1 - Distress Level] : Distress Level: 1 [Patient given social work contact information and resource sheet] : Patient was given social work contact information and resource sheet [IA] : IA [de-identified] : Ms. TOMER GOMEZ is a 55 year old female here for an evaluation of breast cancer. Her oncologic history is as follows:\par \par She underwent a routine screening mammogram on 11/1/17 which showed an indeterminate 0.6 cm mass in the left breast. She underwent left breast biopsy on 11/15/17 which showed invasive well-differentiated ductal carcinoma, [place] score 4/9, with low-grade DCIS. ER/FL 90%, HER-2/geraldo negative.\par \par She underwent bilateral mastectomies and bilateral sentinel axillary lymph nodes on 12/26/17. Pathology from the right side showed radial scar and lymph nodes were negative. Pathology from the left side showed invasive ductal carcinoma, well-differentiated, 0.7 cm, with low-grade DCIS. Margins were negative. 2 sentinel lymph nodes were negative. Oncotype DX recurrence score was 14\par \par 3/2021: She is tolerating tamoxifen well with  + mild tolerable hot flashes. good compliance. No mood changes, wt gain, vaginal dryness, SOB, chest pain, Leg swelling or clotting issues.\par LMP date 5/2019, periods are irregular since starting tamoxifen , no intermittent spotting, no vaginal discharge. Using contraception condoms. Energy and appetite good, wt stable. Working full time from home but leaving job due to stress.  She reports hair loss. she saw derm, started biotin which is helping. TSh ok\par GYN -  Dr Zulema Del Cid, last seen  8/2021, thickened endometrium. s/P bx. She had a polyp and is waiting for surgery after second vaccine. \par Opthal 5/2019\par no need for breast imaging. \par Has Hepatitis C which is stable no issues. Saw GI doctor Emma Mojica in May.\par Her mom passed 5/2021, had liver cancer\par \par 9/2021\par Patient has not had a menstrual period in 2 years.  Chemically she is postmenopausal.  We discussed that she has been on tamoxifen for for 3-1/2 years.  At this point I recommend to switch to aromatase inhibitors.  Typically Arimidex 1 mg daily is used.  Side effects of aromatase inhibitors were reviewed.  Patient was very concerned about bone related side effects.  She reports her aunt had severe complications with Arimidex and her friend was put on bone infusions.  Her aunt suffered with persistent pain despite stopping Arimidex.  We discussed that she is experiencing endometrial issues and has pre-existing hepatic issues and therefore my strong recommendation is to switch tamoxifen to Arimidex at this point but patient was very nervous about making the switch.  We will continue tamoxifen for now and will address this again at the next visit.\par \par 10/2021: \par switched to anastrozole\par \par  [de-identified] : \par GENETICS: GENE DX VUS in PALB2 [FreeTextEntry1] : started Tamoxifen February 16,2018 [de-identified] : Ms. TOMER GOMEZ is here for a follow up visit today for left breast cancer on endocrine therapy since February 16,2018. Switched to anastrozole 10/2021. Prolia started 12/2021\par today 3/2022:   \par She reports feeling sad, depressed since her mom passed. Saw psyche recently and started celexa in 11/2021\par She started anastrozole in October 2021. She reports diffuse body pain since December 2021, migraine gotten severe since then, saw neuro and s/p botox x 2 with mild relief\par Hot flashes are worse\par SHe is s/p Prolia 12/2021 for osteoporosis and body pain much worse since then\par She is very tearful and asking for LTD. We discussed unlikely to get LTD on the basis of stage I BREAST CA and meds s/e are transient. She will d/w psychiatrist to get disability based on depression\par We rec to stop anastrozole x 2 weeks\par wait 2 weeks to see if sx improve\par I rec to do bone scan but she wants to hold off\par if pain persist after 2 weeks, will do a bone scan\par If pain resolve- will switch to tamoxifen or exemestane\par SHe is worried about uterine toxicity from tamoxifen and wishes to try exemestane\par teb 2 weeks\par disability paperwork filled out per her request today\par \par DEXA -2.5 OSTEOPOROSIS , she wishes to continue Prolia. \par

## 2022-04-08 ENCOUNTER — APPOINTMENT (OUTPATIENT)
Dept: HEMATOLOGY ONCOLOGY | Facility: CLINIC | Age: 57
End: 2022-04-08
Payer: COMMERCIAL

## 2022-04-08 DIAGNOSIS — Z79.811 LONG TERM (CURRENT) USE OF AROMATASE INHIBITORS: ICD-10-CM

## 2022-04-08 PROCEDURE — 99213 OFFICE O/P EST LOW 20 MIN: CPT | Mod: 95

## 2022-04-12 NOTE — ASSESSMENT
[Curative] : Goals of care discussed with patient: Curative [FreeTextEntry1] : This is a 56-year-old premenopausal lady who is diagnosed with stage IA (T1b N0) left breast well-differentiated invasive ductal carcinoma ER positive, [place] positive and HER-2/geraldo negative . She underwent bilateral nipple sparing mastectomies and is healing well. Oncotype DX score is 14 which predicts 9% risk of distant recurrence after 5 years of tamoxifen. Her medical h/o is significant for treated HCV with undetectable viral load. Started Ann  February 16,2018\par \par - Breast ca: Started Ann  February 16,2018. Switched to anastrozole 10/2021. Prolia started 12/2021\par Anastrozole stopped 3/2022 due to s/e\par She reports pain is 40% improved\par hot flashes are better\par migraine and mood better\par agree to switch to exemestane \par Rec bone scan to r/o mets\par \par Osteoporosis: concern for worsening bone density and risk of fractures due to anastrozole. Rec to  continue calcium and vit D. DEXA 1-2 yrs. Prolia started 12/2021, next dose 6/2022. \par \par - Mild tolerable hot flashes- advised to wear layers and use fan as needed \par .\par -Hepatitis C Cirrhosis: Has mild thrombocytopenia 2/2 cirrhosis. LFT and AF normal\par - Low Vitamin D: concern for worsening bone loss due to low vit D. Discussed to increase Vit D intake \par \par f/u 3 m \par

## 2022-04-12 NOTE — PHYSICAL EXAM
[Fully active, able to carry on all pre-disease performance without restriction] : Status 0 - Fully active, able to carry on all pre-disease performance without restriction [de-identified] : no exam today [de-identified] : b/ nipple sparing mastectomies with reconstruction. No CW or axillary nodules [de-identified] : healed MAGALYS flap.

## 2022-04-12 NOTE — CONSULT LETTER
[Dear  ___] : Dear  [unfilled], [Consult Letter:] : I had the pleasure of evaluating your patient, [unfilled]. [Please see my note below.] : Please see my note below. [Consult Closing:] : Thank you very much for allowing me to participate in the care of this patient.  If you have any questions, please do not hesitate to contact me. [Sincerely,] : Sincerely, [DrDayami  ___] : Dr. BOWLING [DrDayami ___] : Dr. BOWLING [FreeTextEntry3] : Tayler Ellis M.D.\par  of Medicine\par [facility]\par [facility] [place]\par [address] [place] [address]\par [address]\par Tele # [phone]; Fax [phone]

## 2022-04-12 NOTE — REASON FOR VISIT
[Follow-Up Visit] : a follow-up [Other: _____] : [unfilled] [Home] : at home, [unfilled] , at the time of the visit. [Medical Office: ([facility])___] : at the medical office located in  [Verbal consent obtained from patient] : the patient, [unfilled] [FreeTextEntry2] : Breast ca ER/MD POSITIVE, HER-2 NEGATIVE

## 2022-04-12 NOTE — HISTORY OF PRESENT ILLNESS
[Treatment Protocol] : Treatment Protocol [1 - Distress Level] : Distress Level: 1 [Patient given social work contact information and resource sheet] : Patient was given social work contact information and resource sheet [IA] : IA [de-identified] : Ms. TOMER GOMEZ is a 55 year old female here for an evaluation of breast cancer. Her oncologic history is as follows:\par \par She underwent a routine screening mammogram on 11/1/17 which showed an indeterminate 0.6 cm mass in the left breast. She underwent left breast biopsy on 11/15/17 which showed invasive well-differentiated ductal carcinoma, [place] score 4/9, with low-grade DCIS. ER/NM 90%, HER-2/geraldo negative.\par \par She underwent bilateral mastectomies and bilateral sentinel axillary lymph nodes on 12/26/17. Pathology from the right side showed radial scar and lymph nodes were negative. Pathology from the left side showed invasive ductal carcinoma, well-differentiated, 0.7 cm, with low-grade DCIS. Margins were negative. 2 sentinel lymph nodes were negative. Oncotype DX recurrence score was 14\par \par 3/2021: She is tolerating tamoxifen well with  + mild tolerable hot flashes. good compliance. No mood changes, wt gain, vaginal dryness, SOB, chest pain, Leg swelling or clotting issues.\par LMP date 5/2019, periods are irregular since starting tamoxifen , no intermittent spotting, no vaginal discharge. Using contraception condoms. Energy and appetite good, wt stable. Working full time from home but leaving job due to stress.  She reports hair loss. she saw derm, started biotin which is helping. TSh ok\par GYN -  Dr Zulema Del Cid, last seen  8/2021, thickened endometrium. s/P bx. She had a polyp and is waiting for surgery after second vaccine. \par Opthal 5/2019\par no need for breast imaging. \par Has Hepatitis C which is stable no issues. Saw GI doctor Emma Mojica in May.\par Her mom passed 5/2021, had liver cancer\par \par 9/2021\par Patient has not had a menstrual period in 2 years.  Chemically she is postmenopausal.  We discussed that she has been on tamoxifen for for 3-1/2 years.  At this point I recommend to switch to aromatase inhibitors.  Typically Arimidex 1 mg daily is used.  Side effects of aromatase inhibitors were reviewed.  Patient was very concerned about bone related side effects.  She reports her aunt had severe complications with Arimidex and her friend was put on bone infusions.  Her aunt suffered with persistent pain despite stopping Arimidex.  We discussed that she is experiencing endometrial issues and has pre-existing hepatic issues and therefore my strong recommendation is to switch tamoxifen to Arimidex at this point but patient was very nervous about making the switch.  We will continue tamoxifen for now and will address this again at the next visit.\par \par 10/2021: \par switched to anastrozole\par \par today 3/2022:   \par She reports feeling sad, depressed since her mom passed. Saw psyche recently and started celexa in 11/2021\par She started anastrozole in October 2021. She reports diffuse body pain since December 2021, migraine gotten severe since then, saw neuro and s/p botox x 2 with mild relief\par Hot flashes are worse\par SHe is s/p Prolia 12/2021 for osteoporosis and body pain much worse since then\par She is very tearful and asking for LTD. We discussed unlikely to get LTD on the basis of stage I BREAST CA and meds s/e are transient. She will d/w psychiatrist to get disability based on depression\par We rec to stop anastrozole x 2 weeks\par wait 2 weeks to see if sx improve\par I rec to do bone scan but she wants to hold off\par if pain persist after 2 weeks, will do a bone scan\par If pain resolve- will switch to tamoxifen or exemestane\par SHe is worried about uterine toxicity from tamoxifen and wishes to try exemestane\par teb 2 weeks\par disability paperwork filled out per her request today [de-identified] : \par GENETICS: GENE DX VUS in PALB2 [FreeTextEntry1] : started Tamoxifen February 16,2018 [de-identified] : Ms. TOMER GOMEZ is here for a follow up visit today for left breast cancer on endocrine therapy since February 16,2018. Switched to anastrozole 10/2021. Prolia started 12/2021. Anastrozole stopped 3/2022 due to s/e\par \par 4/8/22: \par Anastrozole stopped 3/2022 due to s/e\par She reports pain is 40% improved\par hot flashes are better\par migraine and mood better\par agree to switch to exemestane \par Rec bone scan to r/o mets\par DEXA -2.5 OSTEOPOROSIS, Prolia started 12/2021, she is on ca+ vit D. Monitor for ONJ and hypocalcemia, \par

## 2022-04-18 ENCOUNTER — NON-APPOINTMENT (OUTPATIENT)
Age: 57
End: 2022-04-18

## 2022-04-29 ENCOUNTER — NON-APPOINTMENT (OUTPATIENT)
Age: 57
End: 2022-04-29

## 2022-05-24 ENCOUNTER — APPOINTMENT (OUTPATIENT)
Dept: NUCLEAR MEDICINE | Facility: IMAGING CENTER | Age: 57
End: 2022-05-24
Payer: COMMERCIAL

## 2022-05-24 ENCOUNTER — OUTPATIENT (OUTPATIENT)
Dept: OUTPATIENT SERVICES | Facility: HOSPITAL | Age: 57
LOS: 1 days | End: 2022-05-24
Payer: COMMERCIAL

## 2022-05-24 DIAGNOSIS — O00.90 UNSPECIFIED ECTOPIC PREGNANCY WITHOUT INTRAUTERINE PREGNANCY: Chronic | ICD-10-CM

## 2022-05-24 DIAGNOSIS — Z00.8 ENCOUNTER FOR OTHER GENERAL EXAMINATION: ICD-10-CM

## 2022-05-24 DIAGNOSIS — Z33.2 ENCOUNTER FOR ELECTIVE TERMINATION OF PREGNANCY: Chronic | ICD-10-CM

## 2022-05-24 DIAGNOSIS — C50.919 MALIGNANT NEOPLASM OF UNSPECIFIED SITE OF UNSPECIFIED FEMALE BREAST: ICD-10-CM

## 2022-05-24 DIAGNOSIS — Z90.13 ACQUIRED ABSENCE OF BILATERAL BREASTS AND NIPPLES: Chronic | ICD-10-CM

## 2022-05-24 DIAGNOSIS — M12.9 ARTHROPATHY, UNSPECIFIED: Chronic | ICD-10-CM

## 2022-05-24 PROCEDURE — 78306 BONE IMAGING WHOLE BODY: CPT | Mod: 26

## 2022-05-24 PROCEDURE — 78306 BONE IMAGING WHOLE BODY: CPT

## 2022-05-24 PROCEDURE — A9561: CPT

## 2022-05-27 ENCOUNTER — APPOINTMENT (OUTPATIENT)
Dept: HEPATOLOGY | Facility: CLINIC | Age: 57
End: 2022-05-27
Payer: COMMERCIAL

## 2022-05-27 VITALS
HEIGHT: 61 IN | DIASTOLIC BLOOD PRESSURE: 72 MMHG | HEART RATE: 67 BPM | RESPIRATION RATE: 16 BRPM | OXYGEN SATURATION: 98 % | TEMPERATURE: 97.8 F | SYSTOLIC BLOOD PRESSURE: 104 MMHG | WEIGHT: 139 LBS | BODY MASS INDEX: 26.24 KG/M2

## 2022-05-27 LAB
BASOPHILS # BLD AUTO: 0.04 K/UL
BASOPHILS NFR BLD AUTO: 0.9 %
EOSINOPHIL # BLD AUTO: 0.07 K/UL
EOSINOPHIL NFR BLD AUTO: 1.6 %
HCT VFR BLD CALC: 42.8 %
HGB BLD-MCNC: 14 G/DL
IMM GRANULOCYTES NFR BLD AUTO: 0 %
LYMPHOCYTES # BLD AUTO: 1.53 K/UL
LYMPHOCYTES NFR BLD AUTO: 34.5 %
MAN DIFF?: NORMAL
MCHC RBC-ENTMCNC: 30 PG
MCHC RBC-ENTMCNC: 32.7 GM/DL
MCV RBC AUTO: 91.6 FL
MONOCYTES # BLD AUTO: 0.46 K/UL
MONOCYTES NFR BLD AUTO: 10.4 %
NEUTROPHILS # BLD AUTO: 2.34 K/UL
NEUTROPHILS NFR BLD AUTO: 52.6 %
PLATELET # BLD AUTO: 152 K/UL
RBC # BLD: 4.67 M/UL
RBC # FLD: 13.2 %
WBC # FLD AUTO: 4.44 K/UL

## 2022-05-27 PROCEDURE — 99215 OFFICE O/P EST HI 40 MIN: CPT

## 2022-05-27 RX ORDER — CYANOCOBALAMIN (VITAMIN B-12) 2500 MCG
5000 TABLET, SUBLINGUAL SUBLINGUAL
Refills: 0 | Status: ACTIVE | COMMUNITY
Start: 2022-05-27

## 2022-05-27 RX ORDER — NAPROXEN 375 MG/1
375 TABLET ORAL
Refills: 0 | Status: ACTIVE | COMMUNITY
Start: 2022-05-27

## 2022-05-27 RX ORDER — ANASTROZOLE TABLETS 1 MG/1
1 TABLET ORAL DAILY
Qty: 90 | Refills: 1 | Status: DISCONTINUED | COMMUNITY
Start: 2021-10-14 | End: 2022-05-27

## 2022-05-27 RX ORDER — EXEMESTANE 25 MG/1
25 TABLET, FILM COATED ORAL
Qty: 1 | Refills: 1 | Status: DISCONTINUED | COMMUNITY
Start: 2022-04-08 | End: 2022-05-27

## 2022-05-27 NOTE — HISTORY OF PRESENT ILLNESS
[de-identified] : Ms. 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 She feels well. She has no abdominal pain or changes in bowel habits. \par \par MH/O breast cancer S/p bilateral mastectomy in Dec 2017. She is now taking Tamoxifen. \par Pertinent History: She started Harvoni on 3/24/15 and completed on 6/16/15, with genotype 1B. She achieved sustained virologic response. She was diagnosed with hepatitis C in 2000. She underwent a liver biopsy at [facility] in 2010 showing stage 3-4 disease. Her contributing risk factor likely to be medical injections while in school in the [place]. \par \par Fibroscan on 11/26/2021 shows F0-1 (E 4.5 kPa) and S0 ( dB/m).\par Fibroscan test from 5/17/19 showed F0-F1, S0\par Fibroscan test performed 5/9/17 showed F2, steatosis S0. \par Fibroscan test from 1/6/15 was consistent with F3-4 disease. \par \par Colonoscopy from 7/14/17 entire examined colon was normal, she has internal hemorrhoids. No family history of colon cancer. She will repeat in 10 years. \par EGD from December 22, 2014 no varices. \par \par Immunity: Immune to HAV (12/18/2014) and HBV (May 2018). Completed hepatitis B vaccination in Nov 2015. She did not respond to standard Hep B vaccination and was given for 3 dose series every month that was completed on 9/12/17. Serology confirmed immunity. \par \par Labs on 05/27/2022 shows WDL-CBC, \par NM bone imaging on 05/24/2022 shows No scan evidence of osseous metastasis. Degenerative disease in the major joints. \par Labs on 03/29/2022 shows WDL-CMP with Low Alp 39. COVID+ spike >250.\par \par Labs on 09/24/21 shows WDL-CMP, Vit D, CBC.\par \par US ab on 06/14/21 shows Main portal vein is patent, with normal direction of flow. Exophytic cyst in the right hepatic lobe measures 1.9 x 1.0 x 1.4 cm, corresponding with lesion present on prior CT. No suspicious liver lesions. Labs on 05/25/21 shows WDL- CMP, CBC, AFP, INR, COVID+ spike >250.\par \par Labs done on 03/10/2021 shows WDL-CBC with  and CMP (since 2017 recorded). US ab on 01/19/21 shows Cirrhotic liver. There is a bilobed cyst measuring 2.3 x 1.2 x 1.4 cm, unchanged. No other focal hepatic lesion is seen. \par \par Ultrasound from 2/26/18 showed no hepatic lesion. \par \par Blood tests from November 21, 2016 ALT 12, AST 15, total bilirubin 0.5, HCV RNA not detected. Abdominal ultrasound from December 23, 2016 shows no hepatic lesions.\par \par Ab US from Jan 2016 showed no hepatic lesions. \par Abdominal sonogram from June 19, 2015 shows no hepatic lesions. Blood tests from April 20, 2015 platelets 134,000, ALT 18, AST 20, HCV- RNA not detected. \par \par Abdominal ultrasound from December 23, 2014 shows no evidence of hepatic mass.\par

## 2022-05-27 NOTE — ASSESSMENT
[FreeTextEntry1] : Ms. 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 Liver biopsy at [place] in 2010 showing stage 3-4 disease. Reviewed the Labs on 03/2022 shows WDL- CMP. US ab on 01/19/21 shows Cirrhotic liver. \par >> HCC screening - No other focal hepatic lesion is seen on 12/27/2021. AFP < 1.8 in Nov 2021. Repeat labs/US ab pending.\par >> PHTN: EGD from December 22, 2014 no varices. Advised to repeat the EGD, would want to discuss with the next visit. Reviewed the WDL-CBC with use of Naprosyn for bone pain relief.\par >> Thrombocytopenia – Normalized in Labs since 11/24/2020.\par \par # Hepatic cyst - US ab on 06/14/21 shows Exophytic cyst in the right hepatic lobe measures 1.9 x 1.0 x 1.4 cm, corresponding with lesion present on prior CT. Explained the benign nature of the cyst even thou it is bilobed. No intervention needed at this time.\par \par # Fatty Liver - Fibroscan on 11/26/2021 shows F0-1 (E 4.5 kPa) and S0 ( dB/m). Normal weight with BMI of 26.8. Advised to continue the healthy lifestyle and expresses that she was sadden from the recent loss of her mom to cancer and resigned from her Job. Advised to stay off the alcohol use and start with exercise regimen and monitor weight.\par \par # Osteoporosis T score -2.5. Recommended to start prolia. Risks benefits reviewed in detail. Recommend dental evaluation and schedule prolia in next 2 M. She will continue calcium and vitamin D. Switched back to tamoxifen.\par \par # H/O Hepatitis CV - Genotype 1B and F3-F4 disease on liver biopsy in 2010. She completed Harvoni on 6/16/15. She is a sustained virologic responder. Advised to call back to discuss the recent labs and US results as ordered.\par \par TOMER was educated about the meaning of sustained virological response. Explained that hepatitis C antibody is present for life. Reviewed that the patient should not donate blood because of the positive antibody and the antibody is not protective and hepatitis C infection can be acquired again if exposed. Taught back the risk factors for acquiring hepatitis C.\par \par # Immunity - Immune to HAV (12/18/2014) and HBV (May 2018). Completed hepatitis B vaccination in Nov 2015. She did not respond to standard Hep B vaccination and was given for 3 dose series every month that was completed on 9/12/17. COVID 2 dose Pfizer vaccine completed in March 2021 +spike in the march 2022 labs. Serology confirmed immunity.\par \par # Colonoscopy from 7/14/17 entire examined colon was normal, she has internal hemorrhoids. No family history of colon cancer. She will repeat in 10 years. \par \par PLAN to continue to follow her every 6 months with laboratory studies, abdominal imaging prior to RPA.\par Encouraged to call back in the interim with any issues or concerns so that we can address and assist as required.\par

## 2022-05-31 ENCOUNTER — OUTPATIENT (OUTPATIENT)
Dept: OUTPATIENT SERVICES | Facility: HOSPITAL | Age: 57
LOS: 1 days | Discharge: HOME | End: 2022-05-31

## 2022-05-31 DIAGNOSIS — Z33.2 ENCOUNTER FOR ELECTIVE TERMINATION OF PREGNANCY: Chronic | ICD-10-CM

## 2022-05-31 DIAGNOSIS — O00.90 UNSPECIFIED ECTOPIC PREGNANCY WITHOUT INTRAUTERINE PREGNANCY: Chronic | ICD-10-CM

## 2022-05-31 DIAGNOSIS — Z90.13 ACQUIRED ABSENCE OF BILATERAL BREASTS AND NIPPLES: Chronic | ICD-10-CM

## 2022-05-31 DIAGNOSIS — C50.919 MALIGNANT NEOPLASM OF UNSPECIFIED SITE OF UNSPECIFIED FEMALE BREAST: ICD-10-CM

## 2022-05-31 DIAGNOSIS — M12.9 ARTHROPATHY, UNSPECIFIED: Chronic | ICD-10-CM

## 2022-05-31 LAB
AFP-TM SERPL-MCNC: <1.8 NG/ML
ALBUMIN SERPL ELPH-MCNC: 4.4 G/DL
ALP BLD-CCNC: 39 U/L
ALT SERPL-CCNC: 17 U/L
ANION GAP SERPL CALC-SCNC: 12 MMOL/L
AST SERPL-CCNC: 19 U/L
BILIRUB SERPL-MCNC: 0.6 MG/DL
BUN SERPL-MCNC: 16 MG/DL
CALCIUM SERPL-MCNC: 9.5 MG/DL
CHLORIDE SERPL-SCNC: 105 MMOL/L
CHOLEST SERPL-MCNC: 155 MG/DL
CO2 SERPL-SCNC: 26 MMOL/L
CREAT SERPL-MCNC: 0.73 MG/DL
EGFR: 97 ML/MIN/1.73M2
ESTIMATED AVERAGE GLUCOSE: 105 MG/DL
HBA1C MFR BLD HPLC: 5.3 %
HDLC SERPL-MCNC: 45 MG/DL
INR PPP: 1.09 RATIO
LDLC SERPL CALC-MCNC: 93 MG/DL
NONHDLC SERPL-MCNC: 109 MG/DL
POTASSIUM SERPL-SCNC: 4.3 MMOL/L
PROT SERPL-MCNC: 6.8 G/DL
PT BLD: 12.9 SEC
SODIUM SERPL-SCNC: 142 MMOL/L
TRIGL SERPL-MCNC: 82 MG/DL

## 2022-06-06 ENCOUNTER — APPOINTMENT (OUTPATIENT)
Dept: INFUSION THERAPY | Facility: HOSPITAL | Age: 57
End: 2022-06-06

## 2022-06-06 ENCOUNTER — APPOINTMENT (OUTPATIENT)
Dept: HEMATOLOGY ONCOLOGY | Facility: CLINIC | Age: 57
End: 2022-06-06
Payer: COMMERCIAL

## 2022-06-06 VITALS
SYSTOLIC BLOOD PRESSURE: 115 MMHG | HEIGHT: 61 IN | BODY MASS INDEX: 26.43 KG/M2 | DIASTOLIC BLOOD PRESSURE: 81 MMHG | TEMPERATURE: 97.1 F | OXYGEN SATURATION: 98 % | HEART RATE: 67 BPM | WEIGHT: 139.99 LBS | RESPIRATION RATE: 16 BRPM

## 2022-06-06 DIAGNOSIS — C79.51 SECONDARY MALIGNANT NEOPLASM OF BONE: ICD-10-CM

## 2022-06-06 PROCEDURE — 99214 OFFICE O/P EST MOD 30 MIN: CPT

## 2022-06-06 RX ORDER — BLOOD SUGAR DIAGNOSTIC
STRIP MISCELLANEOUS
Qty: 8 | Refills: 0 | Status: DISCONTINUED | COMMUNITY
Start: 2022-04-25

## 2022-06-06 RX ORDER — DIFLORASONE DIACETATE 0.5 MG/G
0.05 OINTMENT TOPICAL
Qty: 120 | Refills: 0 | Status: DISCONTINUED | COMMUNITY
Start: 2022-04-27

## 2022-06-06 NOTE — PHYSICAL EXAM
[Fully active, able to carry on all pre-disease performance without restriction] : Status 0 - Fully active, able to carry on all pre-disease performance without restriction [Normal] : affect appropriate [de-identified] : b/ nipple sparing mastectomies with reconstruction. No CW or axillary nodules [de-identified] : healed MAGALYS flap.

## 2022-06-06 NOTE — ASSESSMENT
[Curative] : Goals of care discussed with patient: Curative [FreeTextEntry1] : This is a 56-year-old premenopausal lady who is diagnosed with stage IA (T1b N0) left breast well-differentiated invasive ductal carcinoma ER positive, FL positive and HER-2/geraldo negative . She underwent bilateral nipple sparing mastectomies and is healing well. Oncotype DX score is 14 which predicts 9% risk of distant recurrence after 5 years of tamoxifen. Her medical h/o is significant for treated HCV with undetectable viral load. Started Ann  February 16,2018\par \par - Breast ca: Started Ann  February 16,2018. Switched to anastrozole 10/2021. Prolia started 12/2021. Anastrozole stopped 3/2022 due to s/e.  Bone scan 5/2022 SONAL\par She is switched back to tamoxifen 4/2022.  She reports that side effects are significantly better.\par Plan to continue tamoxifen for 5- 10 years.  Recommend to see GYN and opthal f/u annually due to risk of endometrial ca and cataracts respectively.  No breast imaging needed\par -  Mild hot flashes: 2/2 tamoxifen.  Advised to wear layers and use fan prn. Consider Effexor if get worse.\par -Osteoporosis: Prolia started 12/2021.  She is due today but appointment was not scheduled.  Patient insisting on getting her treatment today after understanding that insurance authorization may be an issue.  Continue calcium and vitamin D.  Continue dental checkups every 6 months to monitor for osteonecrosis.  Blood work every 6 to 12 months to monitor for hypocalcemia.\par - Patient is aware of signs and symptoms of DVT/PE. Patient will report if she develops acute onset chest pain shortness of breath, leg swelling or calf pain. \par -Hepatitis C Cirrhosis: Has mild thrombocytopenia 2/2 cirrhosis. LFT and AF normal\par - Low Vitamin D: concern for worsening bone loss due to low vit D. Discussed to increase Vit D intake \par \par RTC 6 m.  With Prolia

## 2022-06-06 NOTE — CONSULT LETTER
[Dear  ___] : Dear  [unfilled], [Consult Letter:] : I had the pleasure of evaluating your patient, [unfilled]. [Please see my note below.] : Please see my note below. [Consult Closing:] : Thank you very much for allowing me to participate in the care of this patient.  If you have any questions, please do not hesitate to contact me. [Sincerely,] : Sincerely, [DrDayami  ___] : Dr. BOWLING [DrDayami ___] : Dr. BOWLING [FreeTextEntry3] : Tayler Ellis M.D.\par  of Medicine\par [facility]\par [facility]\par [address] [place] [address]\par [address]\par Tele # [phone]; Fax [phone]

## 2022-06-06 NOTE — REASON FOR VISIT
[Follow-Up Visit] : a follow-up [Other: _____] : [unfilled] [Home] : at home, [unfilled] , at the time of the visit. [Medical Office: ([facility])___] : at the medical office located in  [Verbal consent obtained from patient] : the patient, [unfilled] [FreeTextEntry2] : Breast ca ER/AR POSITIVE, HER-2 NEGATIVE

## 2022-06-06 NOTE — HISTORY OF PRESENT ILLNESS
[Treatment Protocol] : Treatment Protocol [1 - Distress Level] : Distress Level: 1 [Patient given social work contact information and resource sheet] : Patient was given social work contact information and resource sheet [IA] : IA [de-identified] : Ms. TOMER GOMEZ is a 55 year old female here for an evaluation of breast cancer. Her oncologic history is as follows:\par \par She underwent a routine screening mammogram on 11/1/17 which showed an indeterminate 0.6 cm mass in the left breast. She underwent left breast biopsy on 11/15/17 which showed invasive well-differentiated ductal carcinoma, [place] score 4/9, with low-grade DCIS. ER/TX 90%, HER-2/geraldo negative.\par \par She underwent bilateral mastectomies and bilateral sentinel axillary lymph nodes on 12/26/17. Pathology from the right side showed radial scar and lymph nodes were negative. Pathology from the left side showed invasive ductal carcinoma, well-differentiated, 0.7 cm, with low-grade DCIS. Margins were negative. 2 sentinel lymph nodes were negative. Oncotype DX recurrence score was 14\par \par 3/2021: She is tolerating tamoxifen well with  + mild tolerable hot flashes. good compliance. No mood changes, wt gain, vaginal dryness, SOB, chest pain, Leg swelling or clotting issues.\par LMP date 5/2019, periods are irregular since starting tamoxifen , no intermittent spotting, no vaginal discharge. Using contraception condoms. Energy and appetite good, wt stable. Working full time from home but leaving job due to stress.  She reports hair loss. she saw derm, started biotin which is helping. TSh ok\par GYN -  Dr Zulema Del Cid, last seen  8/2021, thickened endometrium. s/P bx. She had a polyp and is waiting for surgery after second vaccine. \par Opthal 5/2019\par no need for breast imaging. \par Has Hepatitis C which is stable no issues. Saw GI doctor Emma Mojica in May.\par Her mom passed 5/2021, had liver cancer\par \par 9/2021\par Patient has not had a menstrual period in 2 years.  Chemically she is postmenopausal.  We discussed that she has been on tamoxifen for for 3-1/2 years.  At this point I recommend to switch to aromatase inhibitors.  Typically Arimidex 1 mg daily is used.  Side effects of aromatase inhibitors were reviewed.  Patient was very concerned about bone related side effects.  She reports her aunt had severe complications with Arimidex and her friend was put on bone infusions.  Her aunt suffered with persistent pain despite stopping Arimidex.  We discussed that she is experiencing endometrial issues and has pre-existing hepatic issues and therefore my strong recommendation is to switch tamoxifen to Arimidex at this point but patient was very nervous about making the switch.  We will continue tamoxifen for now and will address this again at the next visit.\par \par 10/2021: \par switched to anastrozole\par \par today 3/2022:   \par She reports feeling sad, depressed since her mom passed. Saw psyche recently and started celexa in 11/2021\par She started anastrozole in October 2021. She reports diffuse body pain since December 2021, migraine gotten severe since then, saw neuro and s/p botox x 2 with mild relief\par Hot flashes are worse\par SHe is s/p Prolia 12/2021 for osteoporosis and body pain much worse since then\par She is very tearful and asking for LTD. We discussed unlikely to get LTD on the basis of stage I BREAST CA and meds s/e are transient. She will d/w psychiatrist to get disability based on depression\par We rec to stop anastrozole x 2 weeks\par wait 2 weeks to see if sx improve\par I rec to do bone scan but she wants to hold off\par if pain persist after 2 weeks, will do a bone scan\par If pain resolve- will switch to tamoxifen or exemestane\par SHe is worried about uterine toxicity from tamoxifen and wishes to try exemestane\par teb 2 weeks\par disability paperwork filled out per her request today\par \par 4/8/22: \par Anastrozole stopped 3/2022 due to s/e\par She reports pain is 40% improved\par hot flashes are better\par migraine and mood better\par agree to switch to exemestane \par Rec bone scan to r/o mets\par DEXA -2.5 OSTEOPOROSIS, Prolia started 12/2021, she is on ca+ vit D. Monitor for ONJ and hypocalcemia, \par  [de-identified] : \par GENETICS: GENE DX VUS in PALB2 [FreeTextEntry1] : started Tamoxifen February 16,2018 [de-identified] : Ms. TOMER GOMEZ is here for a follow up visit today for left breast cancer on endocrine therapy since February 16,2018. Switched to anastrozole 10/2021. Prolia started 12/2021. Anastrozole stopped 3/2022 due to s/e\par \par She is switched to tamoxifen 4/2022, asa 81 with it. Bone scan 5/2022 SONAL\par She is tolerating tamoxifen well with tolerable hot flashes. Good compliance. She denies mood changes, wt gain, vaginal dryness, SOB, chest pain, leg swelling or clotting issues. Her energy and appetite is good, wt stable\par GYN - referral made to see GYN \par Breast imaging: not needed\par Opthal: once a yr\par DEXA osteoporosis : Patient started Prolia 12/2021.  She is due for second dose today but appointment was not made.  Discussed with treatment room an urgent appointment was scheduled.  Patient was explained that there might be insurance approval issue but she wanted to take a chance and get her treatment today.  She reports she has new insurance.  Patient was treated per her request.  No dental issues.  She is on calcium vitamin D.\par

## 2022-06-24 ENCOUNTER — APPOINTMENT (OUTPATIENT)
Dept: ULTRASOUND IMAGING | Facility: IMAGING CENTER | Age: 57
End: 2022-06-24
Payer: COMMERCIAL

## 2022-06-24 ENCOUNTER — OUTPATIENT (OUTPATIENT)
Dept: OUTPATIENT SERVICES | Facility: HOSPITAL | Age: 57
LOS: 1 days | End: 2022-06-24
Payer: COMMERCIAL

## 2022-06-24 DIAGNOSIS — Z33.2 ENCOUNTER FOR ELECTIVE TERMINATION OF PREGNANCY: Chronic | ICD-10-CM

## 2022-06-24 DIAGNOSIS — K74.60 UNSPECIFIED CIRRHOSIS OF LIVER: ICD-10-CM

## 2022-06-24 DIAGNOSIS — Z00.8 ENCOUNTER FOR OTHER GENERAL EXAMINATION: ICD-10-CM

## 2022-06-24 DIAGNOSIS — Z90.13 ACQUIRED ABSENCE OF BILATERAL BREASTS AND NIPPLES: Chronic | ICD-10-CM

## 2022-06-24 DIAGNOSIS — O00.90 UNSPECIFIED ECTOPIC PREGNANCY WITHOUT INTRAUTERINE PREGNANCY: Chronic | ICD-10-CM

## 2022-06-24 DIAGNOSIS — M12.9 ARTHROPATHY, UNSPECIFIED: Chronic | ICD-10-CM

## 2022-06-24 PROCEDURE — 76700 US EXAM ABDOM COMPLETE: CPT | Mod: 26

## 2022-06-24 PROCEDURE — 76700 US EXAM ABDOM COMPLETE: CPT

## 2022-08-02 ENCOUNTER — APPOINTMENT (OUTPATIENT)
Dept: OBGYN | Facility: CLINIC | Age: 57
End: 2022-08-02

## 2022-08-26 ENCOUNTER — LABORATORY RESULT (OUTPATIENT)
Age: 57
End: 2022-08-26

## 2022-08-26 ENCOUNTER — APPOINTMENT (OUTPATIENT)
Dept: OBGYN | Facility: CLINIC | Age: 57
End: 2022-08-26

## 2022-08-26 VITALS
DIASTOLIC BLOOD PRESSURE: 78 MMHG | SYSTOLIC BLOOD PRESSURE: 117 MMHG | HEIGHT: 61 IN | BODY MASS INDEX: 25.86 KG/M2 | WEIGHT: 137 LBS

## 2022-08-26 PROCEDURE — 99386 PREV VISIT NEW AGE 40-64: CPT

## 2022-08-26 PROCEDURE — 99203 OFFICE O/P NEW LOW 30 MIN: CPT | Mod: 25

## 2022-08-26 NOTE — PHYSICAL EXAM
[Appropriately responsive] : appropriately responsive [Alert] : alert [No Acute Distress] : no acute distress [Soft] : soft [Non-tender] : non-tender [Non-distended] : non-distended [No HSM] : No HSM [No Lesions] : no lesions [No Mass] : no mass [Oriented x3] : oriented x3 [Examination Of The Breasts] : a normal appearance [Right Breast Absent] : a total mastectomy [Breast Reconstruction Right] : breast reconstruction [Left Breast Absent] : a total mastectomy [Breast Reconstruction Left] : breast reconstruction [No Masses] : no breast masses were palpable [Labia Majora] : normal [Labia Minora] : normal [Normal] : normal [Uterine Adnexae] : normal [FreeTextEntry3] : supple [FreeTextEntry5] : Normal respiratory effort [FreeTextEntry7] : scar from reconstruction/flaps

## 2022-08-26 NOTE — PLAN
[FreeTextEntry1] : 57 y.o. with history of breast cancer, ovarian cyst presenting today for annual visit/to establish care\par -f/u pap with HPV cotesting. If negative, will need repeat in 5 years\par -Sonogram performed today due to history of ovarian cysts. Right ovarian cyst visualized - 3.4x3.2x2.9cm, simple appearing. Patient reports that this is similar in size to prior scans. Will obtain records from prior scans. If unable to get records, recommend follow up sonogram in 4-6 weeks. Patient would like to defer for 6 months\par -Osteoporosis - continue Prolia\par -f/u 4-6 weeks or 6 months pending records\par

## 2022-08-26 NOTE — HISTORY OF PRESENT ILLNESS
[Patient reported PAP Smear was normal] : Patient reported PAP Smear was normal [FreeTextEntry1] : The patient is a 57 y.o. with history of breast cancer, BRCA negative, currently on Tamoxifen therapy presenting today to establish care. She states that her breast cancer was diagnosed in 2017. She had a double mastectomy at that time with reconstruction. She had routine care with a private GYN but would like to establish care with the [facility]. She has a history of an endometrial polyp which was removed and benign. She also reports a history of a right ovarian cyst which typically ranges from 2-3cm. She denies any vaginal bleeding, bloating, discomfort currently.\par \par \par GYN Hx:\par LMP 5/2019\par Prior pap - ?1year ago\par Denies history of abnormal paps\par Denies history of STDs\par Currently sexually active\par \par OB Hx:\par SVDx3\par Ectopic x1 s/p LSC salpingectomy\par TOP x1 [postmenopausal] : postmenopausal [N] : Patient reports normal menses [Y] : Positive pregnancy history [PapSmeardate] : 2021 [LMPDate] : 5/2019 [PGHxTotal] : 5 [[facility]] : 3 [PGHxAbortions] : 2 [[facility] Phoenixiving] : 3 [PGHxABInduced] : 1 [PGxEctopic] : 1

## 2022-09-15 LAB
CYTOLOGY CVX/VAG DOC THIN PREP: ABNORMAL
HPV HIGH+LOW RISK DNA PNL CVX: DETECTED

## 2022-09-28 ENCOUNTER — APPOINTMENT (OUTPATIENT)
Dept: SURGERY | Facility: CLINIC | Age: 57
End: 2022-09-28

## 2022-09-28 PROCEDURE — 99213K: CUSTOM

## 2022-09-30 ENCOUNTER — APPOINTMENT (OUTPATIENT)
Dept: OBGYN | Facility: CLINIC | Age: 57
End: 2022-09-30

## 2022-09-30 VITALS — BODY MASS INDEX: 26.45 KG/M2 | DIASTOLIC BLOOD PRESSURE: 77 MMHG | WEIGHT: 140 LBS | SYSTOLIC BLOOD PRESSURE: 113 MMHG

## 2022-09-30 DIAGNOSIS — R87.810 ATYPICAL SQUAMOUS CELLS OF UNDETERMINED SIGNIFICANCE ON CYTOLOGIC SMEAR OF CERVIX (ASC-US): ICD-10-CM

## 2022-09-30 DIAGNOSIS — R87.610 ATYPICAL SQUAMOUS CELLS OF UNDETERMINED SIGNIFICANCE ON CYTOLOGIC SMEAR OF CERVIX (ASC-US): ICD-10-CM

## 2022-09-30 PROCEDURE — 57454 BX/CURETT OF CERVIX W/SCOPE: CPT

## 2022-09-30 NOTE — PROCEDURE
[Colposcopy] : Colposcopy  [Time out performed] : Pre-procedure time out performed.  Patient's name, date of birth and procedure confirmed. [Consent Obtained] : Consent obtained [Risks] : risks [Benefits] : benefits [Alternatives] : alternatives [Patient] : patient [Infection] : infection [Bleeding] : bleeding [Allergic Reaction] : allergic reaction [ASCUS] : ASCUS [HPV High Risk] : HPV high risk [No Premedication] : no premedication [Colposcopy Adequate] : colposcopy adequate [SCI Fully Visualized] : SCI fully visualized [ECC Performed] : ECC performed [No Abnormalities] : no abnormalities [Lesion] : lesion seen [Biopsy] : biopsy taken [Hemostasis Obtained] : Hemostasis obtained [Tolerated Well] : the patient tolerated the procedure well [de-identified] : 4 [de-identified] : Abnormal appearing vessels at biopsy sites\par Small 1cm nabothian cyst seen at 12 o'clock [de-identified] : 4 o'clock\par 9 o'clock\par 12 o'clock\par ECC [de-identified] : 56 y/o F presenting for colposcopy\par -f/u ECC, biopsies\par -Discussed treatment based on results. Typical treatment is follow up in 1 year for low grade lesions vs. LEEP/CKC for high grade lesions\par -Patient counseled to abstain from placing anything per vagina: no tampons, douching or intercourse for 1 week\par -Will return in 6 weeks for repeat sonogram due to new ovarian cyst seen on right ovary. Discussed importance given history of breast cancer\par

## 2022-10-06 ENCOUNTER — NON-APPOINTMENT (OUTPATIENT)
Age: 57
End: 2022-10-06

## 2022-11-09 ENCOUNTER — APPOINTMENT (OUTPATIENT)
Dept: OBGYN | Facility: CLINIC | Age: 57
End: 2022-11-09

## 2022-11-28 DIAGNOSIS — M80.00XA AGE-RELATED OSTEOPOROSIS WITH CURRENT PATHOLOGICAL FRACTURE, UNSPECIFIED SITE, INITIAL ENCOUNTER FOR FRACTURE: ICD-10-CM

## 2022-11-29 ENCOUNTER — OUTPATIENT (OUTPATIENT)
Dept: OUTPATIENT SERVICES | Facility: HOSPITAL | Age: 57
LOS: 1 days | Discharge: HOME | End: 2022-11-29

## 2022-11-29 DIAGNOSIS — M12.9 ARTHROPATHY, UNSPECIFIED: Chronic | ICD-10-CM

## 2022-11-29 DIAGNOSIS — O00.90 UNSPECIFIED ECTOPIC PREGNANCY WITHOUT INTRAUTERINE PREGNANCY: Chronic | ICD-10-CM

## 2022-11-29 DIAGNOSIS — C50.919 MALIGNANT NEOPLASM OF UNSPECIFIED SITE OF UNSPECIFIED FEMALE BREAST: ICD-10-CM

## 2022-11-29 DIAGNOSIS — Z90.13 ACQUIRED ABSENCE OF BILATERAL BREASTS AND NIPPLES: Chronic | ICD-10-CM

## 2022-11-29 DIAGNOSIS — Z33.2 ENCOUNTER FOR ELECTIVE TERMINATION OF PREGNANCY: Chronic | ICD-10-CM

## 2022-12-01 LAB
AFP-TM SERPL-MCNC: <1.8 NG/ML
ALBUMIN SERPL ELPH-MCNC: 4.2 G/DL
ALP BLD-CCNC: 37 U/L
ALT SERPL-CCNC: 15 U/L
ANION GAP SERPL CALC-SCNC: 11 MMOL/L
AST SERPL-CCNC: 15 U/L
BASOPHILS # BLD AUTO: 0.04 K/UL
BASOPHILS NFR BLD AUTO: 0.8 %
BILIRUB SERPL-MCNC: 0.4 MG/DL
BUN SERPL-MCNC: 14 MG/DL
CALCIUM SERPL-MCNC: 9.4 MG/DL
CHLORIDE SERPL-SCNC: 106 MMOL/L
CO2 SERPL-SCNC: 26 MMOL/L
CREAT SERPL-MCNC: 0.71 MG/DL
EGFR: 100 ML/MIN/1.73M2
EOSINOPHIL # BLD AUTO: 0.08 K/UL
EOSINOPHIL NFR BLD AUTO: 1.6 %
HCT VFR BLD CALC: 42.2 %
HGB BLD-MCNC: 13.5 G/DL
IMM GRANULOCYTES NFR BLD AUTO: 0.2 %
INR PPP: 1.02 RATIO
LYMPHOCYTES # BLD AUTO: 1.65 K/UL
LYMPHOCYTES NFR BLD AUTO: 33.1 %
MAN DIFF?: NORMAL
MCHC RBC-ENTMCNC: 29.8 PG
MCHC RBC-ENTMCNC: 32 GM/DL
MCV RBC AUTO: 93.2 FL
MONOCYTES # BLD AUTO: 0.45 K/UL
MONOCYTES NFR BLD AUTO: 9 %
NEUTROPHILS # BLD AUTO: 2.76 K/UL
NEUTROPHILS NFR BLD AUTO: 55.3 %
PLATELET # BLD AUTO: 146 K/UL
POTASSIUM SERPL-SCNC: 4.7 MMOL/L
PROT SERPL-MCNC: 6.5 G/DL
PT BLD: 12 SEC
RBC # BLD: 4.53 M/UL
RBC # FLD: 13.3 %
SODIUM SERPL-SCNC: 143 MMOL/L
WBC # FLD AUTO: 4.99 K/UL

## 2022-12-02 ENCOUNTER — APPOINTMENT (OUTPATIENT)
Dept: HEMATOLOGY ONCOLOGY | Facility: CLINIC | Age: 57
End: 2022-12-02

## 2022-12-02 ENCOUNTER — APPOINTMENT (OUTPATIENT)
Dept: HEPATOLOGY | Facility: CLINIC | Age: 57
End: 2022-12-02

## 2022-12-02 ENCOUNTER — APPOINTMENT (OUTPATIENT)
Dept: INFUSION THERAPY | Facility: HOSPITAL | Age: 57
End: 2022-12-02

## 2022-12-02 VITALS
WEIGHT: 142 LBS | BODY MASS INDEX: 25.8 KG/M2 | SYSTOLIC BLOOD PRESSURE: 103 MMHG | HEART RATE: 77 BPM | DIASTOLIC BLOOD PRESSURE: 70 MMHG | RESPIRATION RATE: 16 BRPM | HEIGHT: 62.25 IN | OXYGEN SATURATION: 96 % | TEMPERATURE: 97.2 F

## 2022-12-02 DIAGNOSIS — K76.89 OTHER SPECIFIED DISEASES OF LIVER: ICD-10-CM

## 2022-12-02 DIAGNOSIS — C79.51 SECONDARY MALIGNANT NEOPLASM OF BONE: ICD-10-CM

## 2022-12-02 DIAGNOSIS — D69.6 THROMBOCYTOPENIA, UNSPECIFIED: ICD-10-CM

## 2022-12-02 PROCEDURE — 99214 OFFICE O/P EST MOD 30 MIN: CPT

## 2022-12-02 PROCEDURE — 99215 OFFICE O/P EST HI 40 MIN: CPT

## 2022-12-02 NOTE — HISTORY OF PRESENT ILLNESS
[de-identified] : Ms. TOMER GOMEZ is a 55 year old female here for an evaluation of breast cancer. Her oncologic history is as follows:\par \par She underwent a routine screening mammogram on 11/1/17 which showed an indeterminate 0.6 cm mass in the left breast. She underwent left breast biopsy on 11/15/17 which showed invasive well-differentiated ductal carcinoma, [place] score 4/9, with low-grade DCIS. ER/UT 90%, HER-2/geraldo negative.\par \par She underwent bilateral mastectomies and bilateral sentinel axillary lymph nodes on 12/26/17. Pathology from the right side showed radial scar and lymph nodes were negative. Pathology from the left side showed invasive ductal carcinoma, well-differentiated, 0.7 cm, with low-grade DCIS. Margins were negative. 2 sentinel lymph nodes were negative. Oncotype DX recurrence score was 14\par \par 3/2021: She is tolerating tamoxifen well with  + mild tolerable hot flashes. good compliance. No mood changes, wt gain, vaginal dryness, SOB, chest pain, Leg swelling or clotting issues.\par LMP date 5/2019, periods are irregular since starting tamoxifen , no intermittent spotting, no vaginal discharge. Using contraception condoms. Energy and appetite good, wt stable. Working full time from home but leaving job due to stress.  She reports hair loss. she saw derm, started biotin which is helping. TSh ok\par GYN -  Dr Zulema Del Cid, last seen  8/2021, thickened endometrium. s/P bx. She had a polyp and is waiting for surgery after second vaccine. \par Opthal 5/2019\par no need for breast imaging. \par Has Hepatitis C which is stable no issues. Saw GI doctor Emma Mojica in May.\par Her mom passed 5/2021, had liver cancer\par \par 9/2021\par Patient has not had a menstrual period in 2 years.  Chemically she is postmenopausal.  We discussed that she has been on tamoxifen for for 3-1/2 years.  At this point I recommend to switch to aromatase inhibitors.  Typically Arimidex 1 mg daily is used.  Side effects of aromatase inhibitors were reviewed.  Patient was very concerned about bone related side effects.  She reports her aunt had severe complications with Arimidex and her friend was put on bone infusions.  Her aunt suffered with persistent pain despite stopping Arimidex.  We discussed that she is experiencing endometrial issues and has pre-existing hepatic issues and therefore my strong recommendation is to switch tamoxifen to Arimidex at this point but patient was very nervous about making the switch.  We will continue tamoxifen for now and will address this again at the next visit.\par \par 10/2021: \par switched to anastrozole\par \par today 3/2022:   \par She reports feeling sad, depressed since her mom passed. Saw psyche recently and started celexa in 11/2021\par She started anastrozole in October 2021. She reports diffuse body pain since December 2021, migraine gotten severe since then, saw neuro and s/p botox x 2 with mild relief\par Hot flashes are worse\par SHe is s/p Prolia 12/2021 for osteoporosis and body pain much worse since then\par She is very tearful and asking for LTD. We discussed unlikely to get LTD on the basis of stage I BREAST CA and meds s/e are transient. She will d/w psychiatrist to get disability based on depression\par We rec to stop anastrozole x 2 weeks\par wait 2 weeks to see if sx improve\par I rec to do bone scan but she wants to hold off\par if pain persist after 2 weeks, will do a bone scan\par If pain resolve- will switch to tamoxifen or exemestane\par SHe is worried about uterine toxicity from tamoxifen and wishes to try exemestane\par teb 2 weeks\par disability paperwork filled out per her request today\par \par 4/8/22: \par Anastrozole stopped 3/2022 due to s/e\par She reports pain is 40% improved\par hot flashes are better\par migraine and mood better\par agree to switch to exemestane \par Rec bone scan to r/o mets\par DEXA -2.5 OSTEOPOROSIS, Prolia started 12/2021, she is on ca+ vit D. Monitor for ONJ and hypocalcemia, \par  [de-identified] : \par GENETICS: GENE DX VUS in PALB2 [Treatment Protocol] : Treatment Protocol [FreeTextEntry1] : started Tamoxifen February 16,2018 [de-identified] : Ms. TOMER GOMEZ is here for a follow up visit today for left breast cancer on endocrine therapy since February 16,2018. Switched to anastrozole 10/2021. Prolia started 12/2021. Anastrozole stopped 3/2022 due to s/e\par \par She is switched to tamoxifen 4/2022, asa 81 with it. Bone scan 5/2022 SONAL\par She is tolerating tamoxifen well with tolerable hot flashes. Good compliance. She denies mood changes, wt gain, vaginal dryness, SOB, chest pain, leg swelling or clotting issues. Her energy and appetite is good, wt stable\par GYN - 8/2022\par Breast imaging: not needed\par Opthal: once a yr\par DEXA osteoporosis : Patient started Prolia 12/2021.  She is due for second dose today but appointment was not made.  Discussed with treatment room an urgent appointment was scheduled.  Patient was explained that there might be insurance approval issue but she wanted to take a chance and get her treatment today.  She reports she has new insurance.  Patient was treated per her request.  No dental issues.  She is on calcium vitamin D.\par She will complete 5 yrs in 2/2023. She is concerned about tamoxifen toxicity in the long term mahendra in light of hepatic issues. Will send BCI and f/u in 3 m  [1 - Distress Level] : Distress Level: 1 [Patient given social work contact information and resource sheet] : Patient was given social work contact information and resource sheet [IA] : IA

## 2022-12-02 NOTE — PHYSICAL EXAM
[Fully active, able to carry on all pre-disease performance without restriction] : Status 0 - Fully active, able to carry on all pre-disease performance without restriction [Normal] : affect appropriate [de-identified] : b/ nipple sparing mastectomies with reconstruction. No CW or axillary nodules [de-identified] : healed MAGALYS flap.

## 2022-12-02 NOTE — REASON FOR VISIT
[Follow-Up Visit] : a follow-up [Other: _____] : [unfilled] [FreeTextEntry2] : Breast ca ER/DC POSITIVE, HER-2 NEGATIVE

## 2022-12-02 NOTE — CONSULT LETTER
[Dear  ___] : Dear  [unfilled], [Consult Letter:] : I had the pleasure of evaluating your patient, [unfilled]. [Please see my note below.] : Please see my note below. [Consult Closing:] : Thank you very much for allowing me to participate in the care of this patient.  If you have any questions, please do not hesitate to contact me. [Sincerely,] : Sincerely, [FreeTextEntry3] : Tayler Ellis M.D.\par  of Medicine\par [facility]\par [facility] [place]\par [address] [place] [address]\par [address]\par Tele # [phone]; Fax [phone] [DrDayami  ___] : Dr. BOWLING [DrDayami ___] : Dr. BOWLING

## 2022-12-02 NOTE — ASSESSMENT
[FreeTextEntry1] : This is a 56-year-old premenopausal lady who is diagnosed with stage IA (T1b N0) left breast well-differentiated invasive ductal carcinoma ER positive, DC positive and HER-2/geraldo negative . She underwent bilateral nipple sparing mastectomies and is healing well. Oncotype DX score is 14 which predicts 9% risk of distant recurrence after 5 years of tamoxifen. Her medical h/o is significant for treated HCV with undetectable viral load. Started Ann  February 16,2018\par \par - Breast ca: Started Ann  February 16,2018. Switched to anastrozole 10/2021. Prolia started 12/2021. Anastrozole stopped 3/2022 due to s/e.  Bone scan 5/2022 SONAL\par She is switched back to tamoxifen 4/2022.  She reports that side effects are significantly better.\par Plan to continue tamoxifen for 5- 10 years.  Recommend to see GYN and opthal f/u annually due to risk of endometrial ca and cataracts respectively.  No breast imaging needed\par She will complete 5 yrs in 2/2023. She is concerned about tamoxifen toxicity in the long term mahendra in light of hepatic issues. Will send BCI and f/u in 3 m \par -  Mild hot flashes: 2/2 tamoxifen.  Advised to wear layers and use fan prn. Consider Effexor if get worse.\par -Osteoporosis: Prolia started 12/2021.  She is due today but appointment was not scheduled.  Patient insisting on getting her treatment today after understanding that insurance authorization may be an issue.  Continue calcium and vitamin D.  Continue dental checkups every 6 months to monitor for osteonecrosis.  Blood work every 6 to 12 months to monitor for hypocalcemia.\par - Patient is aware of signs and symptoms of DVT/PE. Patient will report if she develops acute onset chest pain shortness of breath, leg swelling or calf pain. \par -Hepatitis C Cirrhosis: Has mild thrombocytopenia 2/2 cirrhosis. LFT and AF normal\par - Low Vitamin D: concern for worsening bone loss due to low vit D. Discussed to increase Vit D intake \par \par RTC 6 m.  With Prolia [Curative] : Goals of care discussed with patient: Curative

## 2022-12-05 ENCOUNTER — NON-APPOINTMENT (OUTPATIENT)
Age: 57
End: 2022-12-05

## 2022-12-05 NOTE — ASSESSMENT
[FreeTextEntry1] : Ms. 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 Coarsened echotexture. There is mild nodularity of the liver surface is well. A lobulated cyst is noted, 2.7 x 1.4 x 2.1 cm, in the medial left hepatic lobe on US ab done 06/24/2022.\par \par # Cirrhosis - Fibroscan May 2019 showed F0-F1 which is an improvement from F3-F4. Liver biopsy at [facility] in 2010 showing stage 3-4 disease. Reviewed the Labs on 03/2022 shows WDL- CMP. US ab on 01/19/21 shows Cirrhotic liver. \par > HCC screening - No other focal hepatic lesion is seen. AFP normal. \par > PHTN: EGD from December 22, 2014 no varices. Advised to repeat the EGD, would want to discuss with the next visit. Reviewed the WDL-CBC with use of Naprosyn for bone pain relief.\par > Thrombocytopenia – Low Plt noted, was Normalized in Labs since 11/24/2020.\par \par # Hepatic cyst - US ab shows Exophytic cyst in the right hepatic lobe corresponding with lesion present on prior CT. Explained the benign nature of the cyst even though it is bilobed. No intervention needed at this time.\par \par # Fatty Liver - Fibroscan on 11/26/2021 shows F0-1 (E 4.5 kPa) and S0 ( dB/m). Normal weight with BMI of 26.8. Advised to continue the healthy lifestyle and expresses that she was sadden from the recent loss of her mom to cancer and resigned from her Job. Advised to stay off the alcohol use and start with exercise regimen and monitor weight.\par \par # Osteoporosis T score -2.5. On Relpax, Risks benefits reviewed in detail. Recommend dental evaluation. She will continue calcium and vitamin D. \par \par # H/O Hepatitis CV - Genotype 1B and F3-F4 disease on liver biopsy in 2010. She completed Harvoni on 6/16/15. She is a sustained virologic responder. Advised to call back to discuss the recent labs and US results as ordered.\par \par TOMER was educated about the meaning of sustained virological response. Explained that hepatitis C antibody is present for life. Reviewed that the patient should not donate blood because of the positive antibody and the antibody is not protective and hepatitis C infection can be acquired again if exposed. Taught back the risk factors for acquiring hepatitis C.\par \par # Immunity - Immune to HAV (12/18/2014) and HBV (May 2018). Completed hepatitis B vaccination in Nov 2015. She did not respond to standard Hep B vaccination and was given for 3 dose series every month that was completed on 9/12/17. COVID 2 dose Pfizer vaccine completed in March 2021 +spike in the march 2022 labs. Serology confirmed immunity.\par \par # MH/o Breast Cancer - Switched back to tamoxifen.\par \par # Colonoscopy from 7/14/17 entire examined colon was normal, she has internal hemorrhoids. No family history of colon cancer. She will repeat in 10 years. \par \par PLAN to continue to follow her every 6 months with laboratory studies, abdominal imaging prior to RPA.\par Encouraged to call back in the interim with any issues or concerns so that we can address and assist as required.\par

## 2022-12-05 NOTE — HISTORY OF PRESENT ILLNESS
[de-identified] : \par  [FreeTextEntry1] : Ms. TOMER GOMEZ is 57 year old female who presents for the follow up appointment with cirrhosis secondary to hepatitis CV. She feels well. She has no abdominal pain or changes in bowel habits. On Tamoxifen and Relpax, managed by her other providers.\par \par MH/O breast cancer S/p bilateral mastectomy in Dec 2017. She is now taking Tamoxifen. \par Pertinent History: She started Harvoni on 3/24/15 and completed on 6/16/15, with genotype 1B. She achieved sustained virologic response. She was diagnosed with hepatitis C in 2000. She underwent a liver biopsy at [facility] in 2010 showing stage 3-4 disease. Her contributing risk factor likely to be medical injections while in school in the [place]. \par \par Fibroscan on 11/26/2021 shows F0-1 (E 4.5 kPa) and S0 ( dB/m).\par Fibroscan test from 5/17/19 showed F0-F1, S0\par Fibroscan test performed 5/9/17 showed F2, steatosis S0. \par Fibroscan test from 1/6/15 was consistent with F3-4 disease. \par \par Colonoscopy from 7/14/17 entire examined colon was normal, she has internal hemorrhoids. No family history of colon cancer. She will repeat in 10 years. \par EGD from December 22, 2014 no varices. \par \par Immune to HAV (12/18/2014) and HBV (May 2018). Completed hepatitis B vaccination in Nov 2015. She did not respond to standard Hep B vaccination and was given for 3 dose series every month that was completed on 9/12/17. Serology confirmed immunity. \par \par Labs on 11/30/2022: Low  and ALP 37 trend down since 03/25/2022 otherwise WDL- AFP, INR, CBC, CMP, Vit D. Coarsened echotexture. There is mild nodularity of the liver surface is well. A lobulated cyst is noted, 2.7 x 1.4 x 2.1 cm, in the medial left hepatic lobe on US ab done 06/24/2022.\par \par Labs on 05/27/2022 shows WDL-CBC, \par NM bone imaging on 05/24/2022 shows No scan evidence of osseous metastasis. Degenerative disease in the major joints. \par Labs on 03/29/2022 shows WDL-CMP with Low Alp 39. COVID+ spike >250.\par \par Labs on 09/24/21 shows WDL-CMP, Vit D, CBC.\par US ab on 06/14/21 shows Main portal vein is patent, with normal direction of flow. Exophytic cyst in the right hepatic lobe measures 1.9 x 1.0 x 1.4 cm, corresponding with lesion present on prior CT. No suspicious liver lesions. Labs on 05/25/21 shows WDL- CMP, CBC, AFP, INR, COVID+ spike >250.\par \par Labs done on 03/10/2021 shows WDL-CBC with  and CMP (since 2017 recorded). US ab on 01/19/21 shows Cirrhotic liver. There is a bilobed cyst measuring 2.3 x 1.2 x 1.4 cm, unchanged. No other focal hepatic lesion is seen. \par \par Ultrasound from 2/26/18 showed no hepatic lesion. \par \par Blood tests from November 21, 2016 ALT 12, AST 15, total bilirubin 0.5, HCV RNA not detected. Abdominal ultrasound from December 23, 2016 shows no hepatic lesions. Ab US from Jan 2016 showed no hepatic lesions. \par \par Abdominal sonogram from June 19, 2015 shows no hepatic lesions. Blood tests from April 20, 2015 platelets 134,000, ALT 18, AST 20, HCV- RNA not detected. \par Abdominal ultrasound from December 23, 2014 shows no evidence of hepatic mass.\par

## 2022-12-08 LAB
25(OH)D3 SERPL-MCNC: 35.8 NG/ML
BASOPHILS # BLD AUTO: 0.03 K/UL
BASOPHILS NFR BLD AUTO: 0.6 %
EOSINOPHIL # BLD AUTO: 0.11 K/UL
EOSINOPHIL NFR BLD AUTO: 2.2 %
HCT VFR BLD CALC: 41 %
HGB BLD-MCNC: 13.4 G/DL
IMM GRANULOCYTES NFR BLD AUTO: 0.4 %
LYMPHOCYTES # BLD AUTO: 1.69 K/UL
LYMPHOCYTES NFR BLD AUTO: 33.7 %
MAN DIFF?: NORMAL
MCHC RBC-ENTMCNC: 29.6 PG
MCHC RBC-ENTMCNC: 32.7 GM/DL
MCV RBC AUTO: 90.7 FL
MONOCYTES # BLD AUTO: 0.45 K/UL
MONOCYTES NFR BLD AUTO: 9 %
NEUTROPHILS # BLD AUTO: 2.71 K/UL
NEUTROPHILS NFR BLD AUTO: 54.1 %
PLATELET # BLD AUTO: 138 K/UL
RBC # BLD: 4.52 M/UL
RBC # FLD: 13.6 %
WBC # FLD AUTO: 5.01 K/UL

## 2023-01-19 ENCOUNTER — NON-APPOINTMENT (OUTPATIENT)
Age: 58
End: 2023-01-19

## 2023-04-14 ENCOUNTER — APPOINTMENT (OUTPATIENT)
Dept: ANTEPARTUM | Facility: CLINIC | Age: 58
End: 2023-04-14
Payer: COMMERCIAL

## 2023-04-14 ENCOUNTER — APPOINTMENT (OUTPATIENT)
Dept: OBGYN | Facility: CLINIC | Age: 58
End: 2023-04-14
Payer: COMMERCIAL

## 2023-04-14 DIAGNOSIS — N83.291 OTHER OVARIAN CYST, RIGHT SIDE: ICD-10-CM

## 2023-04-14 PROCEDURE — 76857 US EXAM PELVIC LIMITED: CPT

## 2023-04-14 PROCEDURE — 99214 OFFICE O/P EST MOD 30 MIN: CPT

## 2023-04-14 PROCEDURE — 76830 TRANSVAGINAL US NON-OB: CPT

## 2023-04-14 NOTE — PLAN
[FreeTextEntry1] : 57 y.o. with history of breast cancer, ovarian cyst presenting today for a follow up exam\par -Breast exam normal\par -Pelvic sonogram normal without any evidence of ovarian cyst\par -f/u for annual exam

## 2023-04-14 NOTE — HISTORY OF PRESENT ILLNESS
[FreeTextEntry1] : The patient is a 57 y.o. with history of breast cancer, currently on tamoxifen presenting today for a follow up GYN scan. She is currently being followed for a right ovarian cyst. She is complaining of some mild swelling in her right inner arm, which is likely due to lymphedema.

## 2023-04-14 NOTE — PHYSICAL EXAM
[Chaperone Present] : A chaperone was present in the examining room during all aspects of the physical examination [Appropriately responsive] : appropriately responsive [Alert] : alert [No Acute Distress] : no acute distress [Soft] : soft [Non-tender] : non-tender [Non-distended] : non-distended [No HSM] : No HSM [No Lesions] : no lesions [No Mass] : no mass [Oriented x3] : oriented x3 [FreeTextEntry3] : supple [FreeTextEntry5] : Normal respiratory effort [Examination Of The Breasts] : a normal appearance [Normal] : normal [Right Breast Absent] : a total mastectomy [Breast Reconstruction Right] : breast reconstruction [Left Breast Absent] : a total mastectomy [Breast Reconstruction Left] : breast reconstruction [No Masses] : no breast masses were palpable

## 2023-05-10 ENCOUNTER — APPOINTMENT (OUTPATIENT)
Dept: ULTRASOUND IMAGING | Facility: IMAGING CENTER | Age: 58
End: 2023-05-10
Payer: COMMERCIAL

## 2023-05-10 ENCOUNTER — OUTPATIENT (OUTPATIENT)
Dept: OUTPATIENT SERVICES | Facility: HOSPITAL | Age: 58
LOS: 1 days | End: 2023-05-10
Payer: COMMERCIAL

## 2023-05-10 DIAGNOSIS — O00.90 UNSPECIFIED ECTOPIC PREGNANCY WITHOUT INTRAUTERINE PREGNANCY: Chronic | ICD-10-CM

## 2023-05-10 DIAGNOSIS — M12.9 ARTHROPATHY, UNSPECIFIED: Chronic | ICD-10-CM

## 2023-05-10 DIAGNOSIS — K76.89 OTHER SPECIFIED DISEASES OF LIVER: ICD-10-CM

## 2023-05-10 DIAGNOSIS — Z90.13 ACQUIRED ABSENCE OF BILATERAL BREASTS AND NIPPLES: Chronic | ICD-10-CM

## 2023-05-10 DIAGNOSIS — Z33.2 ENCOUNTER FOR ELECTIVE TERMINATION OF PREGNANCY: Chronic | ICD-10-CM

## 2023-05-10 PROCEDURE — 76700 US EXAM ABDOM COMPLETE: CPT

## 2023-05-10 PROCEDURE — 76700 US EXAM ABDOM COMPLETE: CPT | Mod: 26

## 2023-05-12 ENCOUNTER — APPOINTMENT (OUTPATIENT)
Dept: HEPATOLOGY | Facility: CLINIC | Age: 58
End: 2023-05-12

## 2023-06-05 ENCOUNTER — OUTPATIENT (OUTPATIENT)
Dept: OUTPATIENT SERVICES | Facility: HOSPITAL | Age: 58
LOS: 1 days | Discharge: HOME | End: 2023-06-05

## 2023-06-05 ENCOUNTER — APPOINTMENT (OUTPATIENT)
Dept: HEPATOLOGY | Facility: CLINIC | Age: 58
End: 2023-06-05

## 2023-06-05 DIAGNOSIS — C50.919 MALIGNANT NEOPLASM OF UNSPECIFIED SITE OF UNSPECIFIED FEMALE BREAST: ICD-10-CM

## 2023-06-05 DIAGNOSIS — Z33.2 ENCOUNTER FOR ELECTIVE TERMINATION OF PREGNANCY: Chronic | ICD-10-CM

## 2023-06-05 DIAGNOSIS — M12.9 ARTHROPATHY, UNSPECIFIED: Chronic | ICD-10-CM

## 2023-06-05 DIAGNOSIS — O00.90 UNSPECIFIED ECTOPIC PREGNANCY WITHOUT INTRAUTERINE PREGNANCY: Chronic | ICD-10-CM

## 2023-06-05 DIAGNOSIS — Z90.13 ACQUIRED ABSENCE OF BILATERAL BREASTS AND NIPPLES: Chronic | ICD-10-CM

## 2023-06-16 ENCOUNTER — APPOINTMENT (OUTPATIENT)
Dept: HEMATOLOGY ONCOLOGY | Facility: CLINIC | Age: 58
End: 2023-06-16
Payer: COMMERCIAL

## 2023-06-16 ENCOUNTER — RESULT REVIEW (OUTPATIENT)
Age: 58
End: 2023-06-16

## 2023-06-16 ENCOUNTER — APPOINTMENT (OUTPATIENT)
Dept: INFUSION THERAPY | Facility: HOSPITAL | Age: 58
End: 2023-06-16

## 2023-06-16 VITALS
WEIGHT: 142.2 LBS | HEART RATE: 63 BPM | BODY MASS INDEX: 25.8 KG/M2 | DIASTOLIC BLOOD PRESSURE: 79 MMHG | RESPIRATION RATE: 16 BRPM | TEMPERATURE: 97.2 F | SYSTOLIC BLOOD PRESSURE: 114 MMHG | OXYGEN SATURATION: 98 %

## 2023-06-16 DIAGNOSIS — M25.50 PAIN IN UNSPECIFIED JOINT: ICD-10-CM

## 2023-06-16 DIAGNOSIS — Z13.71 ENCOUNTER FOR NONPROCREATIVE SCREENING FOR GENETIC DISEASE CARRIER STATUS: ICD-10-CM

## 2023-06-16 DIAGNOSIS — T45.1X5A PAIN IN UNSPECIFIED JOINT: ICD-10-CM

## 2023-06-16 LAB
BASOPHILS # BLD AUTO: 0.05 K/UL — SIGNIFICANT CHANGE UP (ref 0–0.2)
BASOPHILS NFR BLD AUTO: 0.9 % — SIGNIFICANT CHANGE UP (ref 0–2)
EOSINOPHIL # BLD AUTO: 0.11 K/UL — SIGNIFICANT CHANGE UP (ref 0–0.5)
EOSINOPHIL NFR BLD AUTO: 2.1 % — SIGNIFICANT CHANGE UP (ref 0–6)
HCT VFR BLD CALC: 41 % — SIGNIFICANT CHANGE UP (ref 34.5–45)
HGB BLD-MCNC: 13.5 G/DL — SIGNIFICANT CHANGE UP (ref 11.5–15.5)
IMM GRANULOCYTES NFR BLD AUTO: 0.2 % — SIGNIFICANT CHANGE UP (ref 0–0.9)
LYMPHOCYTES # BLD AUTO: 1.78 K/UL — SIGNIFICANT CHANGE UP (ref 1–3.3)
LYMPHOCYTES # BLD AUTO: 33.3 % — SIGNIFICANT CHANGE UP (ref 13–44)
MCHC RBC-ENTMCNC: 30.1 PG — SIGNIFICANT CHANGE UP (ref 27–34)
MCHC RBC-ENTMCNC: 32.9 G/DL — SIGNIFICANT CHANGE UP (ref 32–36)
MCV RBC AUTO: 91.3 FL — SIGNIFICANT CHANGE UP (ref 80–100)
MONOCYTES # BLD AUTO: 0.53 K/UL — SIGNIFICANT CHANGE UP (ref 0–0.9)
MONOCYTES NFR BLD AUTO: 9.9 % — SIGNIFICANT CHANGE UP (ref 2–14)
NEUTROPHILS # BLD AUTO: 2.87 K/UL — SIGNIFICANT CHANGE UP (ref 1.8–7.4)
NEUTROPHILS NFR BLD AUTO: 53.6 % — SIGNIFICANT CHANGE UP (ref 43–77)
NRBC # BLD: 0 /100 WBCS — SIGNIFICANT CHANGE UP (ref 0–0)
PLATELET # BLD AUTO: 154 K/UL — SIGNIFICANT CHANGE UP (ref 150–400)
RBC # BLD: 4.49 M/UL — SIGNIFICANT CHANGE UP (ref 3.8–5.2)
RBC # FLD: 12.8 % — SIGNIFICANT CHANGE UP (ref 10.3–14.5)
WBC # BLD: 5.35 K/UL — SIGNIFICANT CHANGE UP (ref 3.8–10.5)
WBC # FLD AUTO: 5.35 K/UL — SIGNIFICANT CHANGE UP (ref 3.8–10.5)

## 2023-06-16 PROCEDURE — 99214 OFFICE O/P EST MOD 30 MIN: CPT

## 2023-06-19 DIAGNOSIS — C79.51 SECONDARY MALIGNANT NEOPLASM OF BONE: ICD-10-CM

## 2023-06-22 LAB
AFP-TM SERPL-MCNC: <1.8 NG/ML
ALBUMIN SERPL ELPH-MCNC: 4.6 G/DL
ALP BLD-CCNC: 38 U/L
ALT SERPL-CCNC: 20 U/L
ANION GAP SERPL CALC-SCNC: 13 MMOL/L
AST SERPL-CCNC: 22 U/L
BILIRUB SERPL-MCNC: 0.3 MG/DL
BUN SERPL-MCNC: 15 MG/DL
CALCIUM SERPL-MCNC: 9.7 MG/DL
CHLORIDE SERPL-SCNC: 104 MMOL/L
CO2 SERPL-SCNC: 25 MMOL/L
CREAT SERPL-MCNC: 0.71 MG/DL
EGFR: 99 ML/MIN/1.73M2
ESTIMATED AVERAGE GLUCOSE: 111 MG/DL
GLUCOSE SERPL-MCNC: 94 MG/DL
HBA1C MFR BLD HPLC: 5.5 %
INR PPP: 1.04 RATIO
POTASSIUM SERPL-SCNC: 4.1 MMOL/L
PROT SERPL-MCNC: 6.8 G/DL
PT BLD: 12.1 SEC
SODIUM SERPL-SCNC: 142 MMOL/L

## 2023-06-22 NOTE — REASON FOR VISIT
[Follow-Up Visit] : a follow-up [Other: _____] : [unfilled] [FreeTextEntry2] : Breast ca ER/AZ POSITIVE, HER-2 NEGATIVE

## 2023-06-22 NOTE — CONSULT LETTER
[Dear  ___] : Dear  [unfilled], [Consult Letter:] : I had the pleasure of evaluating your patient, [unfilled]. [Please see my note below.] : Please see my note below. [Consult Closing:] : Thank you very much for allowing me to participate in the care of this patient.  If you have any questions, please do not hesitate to contact me. [Sincerely,] : Sincerely, [DrDayami  ___] : Dr. BOWLING [DrDayami ___] : Dr. BOWLING [FreeTextEntry3] : Tayler Ellis M.D.\par  of Medicine\par [facility]\par [facility]\par [address] [place] [address]\par [address]\par Tele # [phone]; Fax [phone]

## 2023-06-22 NOTE — ASSESSMENT
[Curative] : Goals of care discussed with patient: Curative [FreeTextEntry1] : This is a 56-year-old premenopausal lady who is diagnosed with stage IA (T1b N0) left breast well-differentiated invasive ductal carcinoma ER positive, CA positive and HER-2/geraldo negative . She underwent bilateral nipple sparing mastectomies and is healing well. Oncotype DX score is 14 which predicts 9% risk of distant recurrence after 5 years of tamoxifen. Her medical h/o is significant for treated HCV with undetectable viral load. Started Ann  February 16,2018\par \par - Breast ca: Started Ann  February 16,2018. Switched to anastrozole 10/2021. Prolia started 12/2021. Anastrozole stopped 3/2022 due to s/e.  Bone scan 5/2022 SONAL\par She is switched back to tamoxifen 4/2022.  She reports that side effects are significantly better.\par Plan to continue tamoxifen for 10 years due to high risk BCI\par   Recommend to see GYN and opthal f/u annually due to risk of endometrial ca and cataracts respectively.  No breast imaging needed\par -  Mild hot flashes: 2/2 tamoxifen.  Advised to wear layers and use fan prn. Consider Effexor if get worse.\par -Osteoporosis: Prolia started 12/2021.  PROLIA TODAY\par Continue calcium and vitamin D.  Continue dental checkups every 6 months to monitor for osteonecrosis.  Blood work every 6 to 12 months to monitor for hypocalcemia.\par - Patient is aware of signs and symptoms of DVT/PE. Patient will report if she develops acute onset chest pain shortness of breath, leg swelling or calf pain. \par -Hepatitis C Cirrhosis: Has mild thrombocytopenia 2/2 cirrhosis. LFT and AF normal\par - Low Vitamin D: concern for worsening bone loss due to low vit D. Discussed to increase Vit D intake \par \par RTC 6 m.  With Prolia

## 2023-06-22 NOTE — PHYSICAL EXAM
[Fully active, able to carry on all pre-disease performance without restriction] : Status 0 - Fully active, able to carry on all pre-disease performance without restriction [Normal] : affect appropriate [de-identified] : b/ nipple sparing mastectomies with reconstruction. No CW or axillary nodules [de-identified] : healed MAGALYS flap.

## 2023-06-22 NOTE — HISTORY OF PRESENT ILLNESS
[Treatment Protocol] : Treatment Protocol [1 - Distress Level] : Distress Level: 1 [Patient given social work contact information and resource sheet] : Patient was given social work contact information and resource sheet [IA] : IA [de-identified] : Ms. TOMER GOMEZ is a 55 year old female here for an evaluation of breast cancer. Her oncologic history is as follows:\par \par She underwent a routine screening mammogram on 11/1/17 which showed an indeterminate 0.6 cm mass in the left breast. She underwent left breast biopsy on 11/15/17 which showed invasive well-differentiated ductal carcinoma, [place] score 4/9, with low-grade DCIS. ER/HI 90%, HER-2/geraldo negative.\par \par She underwent bilateral mastectomies and bilateral sentinel axillary lymph nodes on 12/26/17. Pathology from the right side showed radial scar and lymph nodes were negative. Pathology from the left side showed invasive ductal carcinoma, well-differentiated, 0.7 cm, with low-grade DCIS. Margins were negative. 2 sentinel lymph nodes were negative. Oncotype DX recurrence score was 14\par \par 3/2021: She is tolerating tamoxifen well with  + mild tolerable hot flashes. good compliance. No mood changes, wt gain, vaginal dryness, SOB, chest pain, Leg swelling or clotting issues.\par LMP date 5/2019, periods are irregular since starting tamoxifen , no intermittent spotting, no vaginal discharge. Using contraception condoms. Energy and appetite good, wt stable. Working full time from home but leaving job due to stress.  She reports hair loss. she saw derm, started biotin which is helping. TSh ok\par GYN -  Dr Zulema Del Cid, last seen  8/2021, thickened endometrium. s/P bx. She had a polyp and is waiting for surgery after second vaccine. \par Opthal 5/2019\par no need for breast imaging. \par Has Hepatitis C which is stable no issues. Saw GI doctor Emma Mojica in May.\par Her mom passed 5/2021, had liver cancer\par \par 9/2021\par Patient has not had a menstrual period in 2 years.  Chemically she is postmenopausal.  We discussed that she has been on tamoxifen for for 3-1/2 years.  At this point I recommend to switch to aromatase inhibitors.  Typically Arimidex 1 mg daily is used.  Side effects of aromatase inhibitors were reviewed.  Patient was very concerned about bone related side effects.  She reports her aunt had severe complications with Arimidex and her friend was put on bone infusions.  Her aunt suffered with persistent pain despite stopping Arimidex.  We discussed that she is experiencing endometrial issues and has pre-existing hepatic issues and therefore my strong recommendation is to switch tamoxifen to Arimidex at this point but patient was very nervous about making the switch.  We will continue tamoxifen for now and will address this again at the next visit.\par \par 10/2021: \par switched to anastrozole\par \par today 3/2022:   \par She reports feeling sad, depressed since her mom passed. Saw psyche recently and started celexa in 11/2021\par She started anastrozole in October 2021. She reports diffuse body pain since December 2021, migraine gotten severe since then, saw neuro and s/p botox x 2 with mild relief\par Hot flashes are worse\par SHe is s/p Prolia 12/2021 for osteoporosis and body pain much worse since then\par She is very tearful and asking for LTD. We discussed unlikely to get LTD on the basis of stage I BREAST CA and meds s/e are transient. She will d/w psychiatrist to get disability based on depression\par We rec to stop anastrozole x 2 weeks\par wait 2 weeks to see if sx improve\par I rec to do bone scan but she wants to hold off\par if pain persist after 2 weeks, will do a bone scan\par If pain resolve- will switch to tamoxifen or exemestane\par SHe is worried about uterine toxicity from tamoxifen and wishes to try exemestane\par teb 2 weeks\par disability paperwork filled out per her request today\par \par 4/8/22: \par Anastrozole stopped 3/2022 due to s/e\par She reports pain is 40% improved\par hot flashes are better\par migraine and mood better\par agree to switch to exemestane \par Rec bone scan to r/o mets\par DEXA -2.5 OSTEOPOROSIS, Prolia started 12/2021, she is on ca+ vit D. Monitor for ONJ and hypocalcemia, \par \par 2/2023\par BCI high risk\par d/w pt \par continue bose \par refill sent. \par  [de-identified] : \par GENETICS: GENE DX VUS in PALB2 [FreeTextEntry1] : started Tamoxifen February 16,2018 [de-identified] : Ms. TOMER GOMEZ is here for a follow up visit today for left breast cancer on endocrine therapy since February 16,2018. Switched to anastrozole 10/2021. Prolia started 12/2021. Anastrozole stopped 3/2022 due to s/e. BCI HIGH RISK \par \par She is switched to tamoxifen 4/2022, asa 81 with it. Bone scan 5/2022 SONAL\par She is tolerating tamoxifen well with tolerable hot flashes. Good compliance. She denies mood changes, wt gain, vaginal dryness, SOB, chest pain, leg swelling or clotting issues. Her energy and appetite is good, wt stable\par GYN - 4/2023, TVS normal thickness 4/2023\par Breast imaging: not needed\par Opthal: once a yr\par DEXA osteoporosis : Patient started Prolia 12/2021. PROLIA today  No dental issues.  She is on calcium vitamin D. DEXA 10/2023\par She has completed 5 yrs but will continue 10 yrs of bose due to high BCI

## 2023-06-23 ENCOUNTER — APPOINTMENT (OUTPATIENT)
Dept: HEPATOLOGY | Facility: CLINIC | Age: 58
End: 2023-06-23
Payer: COMMERCIAL

## 2023-06-23 VITALS
HEART RATE: 62 BPM | RESPIRATION RATE: 14 BRPM | SYSTOLIC BLOOD PRESSURE: 116 MMHG | OXYGEN SATURATION: 100 % | BODY MASS INDEX: 25.3 KG/M2 | TEMPERATURE: 97 F | DIASTOLIC BLOOD PRESSURE: 75 MMHG | HEIGHT: 62.5 IN | WEIGHT: 141 LBS

## 2023-06-23 DIAGNOSIS — Z79.899 OTHER LONG TERM (CURRENT) DRUG THERAPY: ICD-10-CM

## 2023-06-23 DIAGNOSIS — Z80.0 FAMILY HISTORY OF MALIGNANT NEOPLASM OF DIGESTIVE ORGANS: ICD-10-CM

## 2023-06-23 DIAGNOSIS — Z86.19 PERSONAL HISTORY OF OTHER INFECTIOUS AND PARASITIC DISEASES: ICD-10-CM

## 2023-06-23 DIAGNOSIS — Z00.00 ENCOUNTER FOR GENERAL ADULT MEDICAL EXAMINATION W/OUT ABNORMAL FINDINGS: ICD-10-CM

## 2023-06-23 PROCEDURE — 76981 USE PARENCHYMA: CPT

## 2023-06-23 PROCEDURE — 99214 OFFICE O/P EST MOD 30 MIN: CPT

## 2023-06-23 RX ORDER — ONABOTULINUMTOXINA 100 [USP'U]/1
100 INJECTION, POWDER, LYOPHILIZED, FOR SOLUTION INTRADERMAL; INTRAMUSCULAR
Refills: 0 | Status: ACTIVE | COMMUNITY
Start: 2022-05-27

## 2023-06-23 RX ORDER — LORAZEPAM 0.5 MG/1
0.5 TABLET ORAL
Qty: 30 | Refills: 0 | Status: DISCONTINUED | COMMUNITY
Start: 2021-12-01 | End: 2023-06-23

## 2023-06-23 RX ORDER — HALOBETASOL PROPIONATE 0.5 MG/G
0.05 CREAM TOPICAL
Qty: 15 | Refills: 0 | Status: DISCONTINUED | COMMUNITY
Start: 2021-10-08 | End: 2023-06-23

## 2023-06-23 RX ORDER — UBROGEPANT 100 MG/1
TABLET ORAL
Refills: 0 | Status: ACTIVE | COMMUNITY

## 2023-06-23 RX ORDER — COLD-HOT PACK
EACH MISCELLANEOUS
Refills: 0 | Status: ACTIVE | COMMUNITY

## 2023-06-23 RX ORDER — TOPIRAMATE 25 MG/1
25 TABLET, FILM COATED ORAL
Qty: 60 | Refills: 0 | Status: DISCONTINUED | COMMUNITY
Start: 2021-10-28 | End: 2023-06-23

## 2023-06-23 NOTE — HISTORY OF PRESENT ILLNESS
[de-identified] : Ms. Villa is a 59 yo F with overweight BMI, osteoporosis, migraines, history of left breast invasive well-differentiated ductal carcinoma (s/p bilateral nipple sparing mastectomies and bilateral sentinel axillary lymph node dissections 17, pT1bN0, stage IA, ER/MA+, HER-2/geraldo negative, s/p tamoxifen 2018-10/2021, switched to anastrazole 10/2021-3/2022 but intolerant, then switched back to tamoxifen 2022- with plan for total 10 years of treatment due to high risk BCI), and a history of chronic HCV (likely acquired from injections while in the [place], genotype 1B). She underwent liver biopsy at [facility] in  showing F3-4 fibrosis and pre-treatment FibroScan on 1/6/15 showing F3-4 fibrosis. She was treated with Harvoni from 3/24/15-6/16/15 with SVR, with subsequent regression of fibrosis with most recent FibroScan (21) showing F0-1 fibrosis, though liver morphology is still cirrhotic on ultrasounds (last on 5/10/23). She has no history of overt hepatic decompensations (i.e., no history of variceal hemorrhage, ascites, hepatic encephalopathy, or jaundice), no history of HCC, and no upper GI varices on last EGD (on 14, prior to Harvoni).\par \par She was previously followed by hepatologist Dr. Mykel Johns and NPs Emma Poe and Rey Watson (with last visit on 22) and is here today for routine follow-up and to transition her hepatology care as Dr. Johns is no longer at this practice and she does not want NP visits only.\par \par FibroScan today showed median liver stiffness 4.2 kPA (normal, consistent with F0-1 fibrosis) and CAP score 176 dB/m (normal, consistent with S0 steatosis).\par \par Today, she reports feeling well overall. \par \par Her mother recently  of primary liver cancer (possibly cholangiocarcinoma, but she is unsure). She had had a prior hepatectomy at [facility] but the cancer recurred. Her mother also had a remote history of breast cancer. Her maternal grandfather also had liver cancer (unsure what type). Her  had HCV without advanced fibrosis treated successfully by Dr. 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 She is . She has 3 children and a 4-week-old granddaughter. Her daughter and granddaughter live in [place]. She and her  are planning to spend the winter in [facility] in [place].

## 2023-06-23 NOTE — ASSESSMENT
[FreeTextEntry1] : # Chronic hepatitis C with pre-treatment compensated cirrhosis, now s/p Harvoni (completed in 2015) with SVR:\par - She has had interval regression of fibrosis based on FibroScans including today's which showed normal median liver stiffness suggesting F0-1 fibrosis. However, her liver morphology still appears cirrhotic by ultrasound.\par - Based on her most recent labs (6/16/23), she has normal liver chemistries and preserved liver synthetic function, but borderline thrombocytopenia with Plt 154 (previously with mild thrombocytopenia in 130s-140s on labs in 11/2022). Repeat labs q6 months, next due in 11/2023 and ordered today.\par - We discussed that she will likely have anti-HCV antibody positivity lifelong but that this does not confer protection against reinfection.\par - We discussed the need for lifelong screening for HCC every 6 months with AFP and hepatic imaging. Recent AFP was <1.8 (6/16/23) and there was no suspicious hepatic lesion on last ultrasound (5/10/23). Repeat AFP with next labs and repeat ultrasound ordered today for 11/2023.\par - Given borderline thrombocytopenia concerning for possible portal hypertension (despite reassuring FibroScans), I am recommending repeat EGD to screen for varices as this has not been done since 2014. She is scheduled for an EGD with me on 7/18/23.\par \par # Chronic use of tamoxifen: We discussed that this medication can lead to a risk of hepatic steatosis and steatohepatitis that could potentially lead to worsening liver disease. However, based on her reassuring labs, imaging, and FibroScan as above, it is reasonable to continue therapy for now (benefits outweigh risks) with continued monitoring including labs every 6 months, ultrasound every 6 months, and FibroScan annually.\par \par # Health maintenance:\par - HAV immune.\par - Reportedly also HBV immune, but will check HBcAb with next labs to confirm that immunity is from vaccination and not prior exposure.\par - Ms. GOMEZ was counseled to: abstain from alcohol; avoid use of herbal and dietary supplements due to potential hepatotoxicity; and limit use of acetaminophen to <2 grams per day.\par \par Next follow-up: 6 months

## 2023-07-18 ENCOUNTER — APPOINTMENT (OUTPATIENT)
Dept: HEPATOLOGY | Facility: HOSPITAL | Age: 58
End: 2023-07-18

## 2023-07-18 ENCOUNTER — TRANSCRIPTION ENCOUNTER (OUTPATIENT)
Age: 58
End: 2023-07-18

## 2023-07-18 ENCOUNTER — OUTPATIENT (OUTPATIENT)
Dept: OUTPATIENT SERVICES | Facility: HOSPITAL | Age: 58
LOS: 1 days | End: 2023-07-18
Payer: COMMERCIAL

## 2023-07-18 VITALS
SYSTOLIC BLOOD PRESSURE: 117 MMHG | TEMPERATURE: 97 F | HEART RATE: 69 BPM | OXYGEN SATURATION: 100 % | WEIGHT: 141.1 LBS | HEIGHT: 62 IN | RESPIRATION RATE: 18 BRPM | DIASTOLIC BLOOD PRESSURE: 66 MMHG

## 2023-07-18 VITALS
HEART RATE: 60 BPM | OXYGEN SATURATION: 96 % | DIASTOLIC BLOOD PRESSURE: 60 MMHG | SYSTOLIC BLOOD PRESSURE: 110 MMHG | RESPIRATION RATE: 18 BRPM

## 2023-07-18 DIAGNOSIS — M12.9 ARTHROPATHY, UNSPECIFIED: Chronic | ICD-10-CM

## 2023-07-18 DIAGNOSIS — O00.90 UNSPECIFIED ECTOPIC PREGNANCY WITHOUT INTRAUTERINE PREGNANCY: Chronic | ICD-10-CM

## 2023-07-18 DIAGNOSIS — K74.60 UNSPECIFIED CIRRHOSIS OF LIVER: ICD-10-CM

## 2023-07-18 DIAGNOSIS — Z90.13 ACQUIRED ABSENCE OF BILATERAL BREASTS AND NIPPLES: Chronic | ICD-10-CM

## 2023-07-18 DIAGNOSIS — Z33.2 ENCOUNTER FOR ELECTIVE TERMINATION OF PREGNANCY: Chronic | ICD-10-CM

## 2023-07-18 PROCEDURE — 43235 EGD DIAGNOSTIC BRUSH WASH: CPT

## 2023-07-18 RX ORDER — LIDOCAINE HCL 20 MG/ML
4 VIAL (ML) INJECTION ONCE
Refills: 0 | Status: DISCONTINUED | OUTPATIENT
Start: 2023-07-18 | End: 2023-08-01

## 2023-07-18 RX ORDER — IPRATROPIUM/ALBUTEROL SULFATE 18-103MCG
3 AEROSOL WITH ADAPTER (GRAM) INHALATION ONCE
Refills: 0 | Status: DISCONTINUED | OUTPATIENT
Start: 2023-07-18 | End: 2023-08-01

## 2023-07-18 RX ORDER — TAMOXIFEN CITRATE 20 MG/1
1 TABLET, FILM COATED ORAL
Qty: 0 | Refills: 0 | DISCHARGE

## 2023-07-18 RX ORDER — UBROGEPANT 100 MG/1
1 TABLET ORAL
Refills: 0 | DISCHARGE

## 2023-07-18 RX ORDER — ASPIRIN/CALCIUM CARB/MAGNESIUM 324 MG
1 TABLET ORAL
Qty: 0 | Refills: 0 | DISCHARGE

## 2023-07-18 RX ORDER — ELETRIPTAN HYDROBROMIDE 20 MG/1
1 TABLET, FILM COATED ORAL
Qty: 0 | Refills: 0 | DISCHARGE

## 2023-07-18 RX ORDER — SODIUM CHLORIDE 9 MG/ML
500 INJECTION INTRAMUSCULAR; INTRAVENOUS; SUBCUTANEOUS
Refills: 0 | Status: COMPLETED | OUTPATIENT
Start: 2023-07-18 | End: 2023-07-18

## 2023-07-18 RX ADMIN — SODIUM CHLORIDE 30 MILLILITER(S): 9 INJECTION INTRAMUSCULAR; INTRAVENOUS; SUBCUTANEOUS at 08:08

## 2023-07-18 NOTE — PRE PROCEDURE NOTE - PRE PROCEDURE EVALUATION
Attending Physician:  Dr. Fabrizio Simmons                        Procedure:  EGD    Indication for Procedure:  Screening for esophageal varices  ________________________________________________________  PAST MEDICAL & SURGICAL HISTORY:  Breast cancer approx Nov/Dec. 2017 -- diagnosed with b/l breast cancer  Hepatitis C diagnosed in 2001 -- treated with medication with SVR  Migraines  Ectopic pregnancy 1997 -> termination of pregnancy  Arthropathy - left knee  S/P mastectomy, bilateral with MAGALYS flap 2017  Compensated HCV cirrhosis    ALLERGIES:  No Known Allergies    HOME MEDICATIONS:  Aspir 81 oral delayed release tablet: 1 tab(s) orally once a day (at bedtime)  continue ASA pre op  Relpax 40 mg oral tablet: 1 tab(s) orally once a day, As Needed  tamoxifen 10 mg oral tablet: 1 tab(s) orally once a day    AICD/PPM: [ ] yes   [X] no    PERTINENT LAB DATA:  Reviewed in HIE.    PHYSICAL EXAMINATION:    T(C): --  HR: --  BP: --  RR: --  SpO2: --    Constitutional: NAD    Neck:  No JVD  Respiratory: CTAB/L  Cardiovascular: S1 and S2  Gastrointestinal: BS+, soft, NT/ND  Extremities: No peripheral edema  Neurological: A/O x 3, no focal deficits    COMMENTS:    The patient is a suitable candidate for the planned procedure unless box checked [ ]  No, explain:

## 2023-07-18 NOTE — ASU PATIENT PROFILE, ADULT - NSICDXPASTSURGICALHX_GEN_ALL_CORE_FT
PAST SURGICAL HISTORY:  Arthropathy left knee    Ectopic pregnancy 1997    S/P mastectomy, bilateral with MAGALYS flap 2017    Termination of pregnancy (fetus)

## 2023-07-18 NOTE — ASU DISCHARGE PLAN (ADULT/PEDIATRIC) - NS MD DC FALL RISK RISK
For information on Fall & Injury Prevention, visit: [email or web address] OR  [email or web address] OR  https://www.cdc.gov/steadi/patient.html

## 2023-07-18 NOTE — ASU PATIENT PROFILE, ADULT - FALL HARM RISK - UNIVERSAL INTERVENTIONS
Bed in lowest position, wheels locked, appropriate side rails in place/Call bell, personal items and telephone in reach/Instruct patient to call for assistance before getting out of bed or chair/Non-slip footwear when patient is out of bed/[place] to call system/Physically safe environment - no spills, clutter or unnecessary equipment/Purposeful Proactive Rounding/Room/bathroom lighting operational, light cord in reach

## 2023-07-18 NOTE — ASU PATIENT PROFILE, ADULT - NSICDXPASTMEDICALHX_GEN_ALL_CORE_FT
PAST MEDICAL HISTORY:  Breast cancer approx Nov/Dec. 2017 -- diagnosed with b/l breast cancer    Hepatitis C diagnosed in 2001 -- treated with medication    Migraines     Osteoporosis

## 2023-08-09 ENCOUNTER — APPOINTMENT (OUTPATIENT)
Dept: OPHTHALMOLOGY | Facility: CLINIC | Age: 58
End: 2023-08-09
Payer: COMMERCIAL

## 2023-08-09 ENCOUNTER — NON-APPOINTMENT (OUTPATIENT)
Age: 58
End: 2023-08-09

## 2023-08-09 PROCEDURE — 92134 CPTRZ OPH DX IMG PST SGM RTA: CPT

## 2023-08-09 PROCEDURE — 92014 COMPRE OPH EXAM EST PT 1/>: CPT

## 2023-08-28 ENCOUNTER — APPOINTMENT (OUTPATIENT)
Dept: OBGYN | Facility: CLINIC | Age: 58
End: 2023-08-28
Payer: COMMERCIAL

## 2023-08-28 VITALS — DIASTOLIC BLOOD PRESSURE: 67 MMHG | SYSTOLIC BLOOD PRESSURE: 99 MMHG | WEIGHT: 147 LBS | BODY MASS INDEX: 26.46 KG/M2

## 2023-08-28 DIAGNOSIS — R10.813 RIGHT LOWER QUADRANT ABDOMINAL TENDERNESS: ICD-10-CM

## 2023-08-28 DIAGNOSIS — Z01.419 ENCOUNTER FOR GYNECOLOGICAL EXAMINATION (GENERAL) (ROUTINE) W/OUT ABNORMAL FINDINGS: ICD-10-CM

## 2023-08-28 PROCEDURE — 99396 PREV VISIT EST AGE 40-64: CPT

## 2023-08-28 NOTE — PHYSICAL EXAM
[Appropriately responsive] : appropriately responsive [Alert] : alert [No Acute Distress] : no acute distress [Soft] : soft [Non-distended] : non-distended [No HSM] : No HSM [No Lesions] : no lesions [No Mass] : no mass [Oriented x3] : oriented x3 [FreeTextEntry7] : mild tenderness in RLQ [] : implants [Right Breast Absent] : a total mastectomy [Breast Reconstruction Right] : breast reconstruction [Left Breast Absent] : a total mastectomy [Breast Reconstruction Left] : breast reconstruction [No Masses] : no breast masses were palpable [Labia Majora] : normal [Labia Minora] : normal [Normal] : normal [Adnexa Tenderness On The Right] : tender  [Uterine Adnexae] : normal

## 2023-08-28 NOTE — PROCEDURE
[Transvaginal Ultrasound] : transvaginal ultrasound [FreeTextEntry3] : Normal pelvic sonogram. EM 1-2mm

## 2023-08-28 NOTE — DISCUSSION/SUMMARY
[FreeTextEntry1] : 58 year old F presenting for annual exam -f/u pap and GC/CT -RLQ pain: sonogram wnl, recommend bowel regimen -Contraception: menopause 2019 -f/u PRN

## 2023-08-28 NOTE — HISTORY OF PRESENT ILLNESS
[FreeTextEntry1] : Patient is a 58 year old F with PMH of breast cancer on tamoxifen presenting for an annual visit. LMP 2019. She is feeling well but c/o some RLQ pain - sono 4/2023 wnl. She denies vaginal itching, odor and discharge. Denies urinary urgency, frequency and dysuria. She is currently sexually active with one male partner.

## 2023-08-29 LAB
C TRACH RRNA SPEC QL NAA+PROBE: NOT DETECTED
HPV HIGH+LOW RISK DNA PNL CVX: NOT DETECTED
N GONORRHOEA RRNA SPEC QL NAA+PROBE: NOT DETECTED
SOURCE AMPLIFICATION: NORMAL

## 2023-09-06 LAB — CYTOLOGY CVX/VAG DOC THIN PREP: NORMAL

## 2023-09-07 PROBLEM — M81.0 AGE-RELATED OSTEOPOROSIS WITHOUT CURRENT PATHOLOGICAL FRACTURE: Chronic | Status: ACTIVE | Noted: 2023-07-18

## 2023-09-27 ENCOUNTER — APPOINTMENT (OUTPATIENT)
Dept: SURGERY | Facility: CLINIC | Age: 58
End: 2023-09-27
Payer: COMMERCIAL

## 2023-09-27 PROCEDURE — 99213K: CUSTOM

## 2023-11-14 ENCOUNTER — OUTPATIENT (OUTPATIENT)
Dept: OUTPATIENT SERVICES | Facility: HOSPITAL | Age: 58
LOS: 1 days | Discharge: HOME | End: 2023-11-14

## 2023-11-14 DIAGNOSIS — O00.90 UNSPECIFIED ECTOPIC PREGNANCY WITHOUT INTRAUTERINE PREGNANCY: Chronic | ICD-10-CM

## 2023-11-14 DIAGNOSIS — C50.919 MALIGNANT NEOPLASM OF UNSPECIFIED SITE OF UNSPECIFIED FEMALE BREAST: ICD-10-CM

## 2023-11-14 DIAGNOSIS — Z90.13 ACQUIRED ABSENCE OF BILATERAL BREASTS AND NIPPLES: Chronic | ICD-10-CM

## 2023-11-14 DIAGNOSIS — Z33.2 ENCOUNTER FOR ELECTIVE TERMINATION OF PREGNANCY: Chronic | ICD-10-CM

## 2023-11-14 DIAGNOSIS — M12.9 ARTHROPATHY, UNSPECIFIED: Chronic | ICD-10-CM

## 2023-11-29 ENCOUNTER — RESULT REVIEW (OUTPATIENT)
Age: 58
End: 2023-11-29

## 2023-11-29 ENCOUNTER — APPOINTMENT (OUTPATIENT)
Dept: INFUSION THERAPY | Facility: HOSPITAL | Age: 58
End: 2023-11-29

## 2023-11-29 ENCOUNTER — APPOINTMENT (OUTPATIENT)
Dept: HEMATOLOGY ONCOLOGY | Facility: CLINIC | Age: 58
End: 2023-11-29
Payer: COMMERCIAL

## 2023-11-29 VITALS
RESPIRATION RATE: 15 BRPM | SYSTOLIC BLOOD PRESSURE: 108 MMHG | TEMPERATURE: 97.3 F | DIASTOLIC BLOOD PRESSURE: 75 MMHG | OXYGEN SATURATION: 99 % | HEART RATE: 73 BPM | WEIGHT: 142.2 LBS | BODY MASS INDEX: 25.59 KG/M2

## 2023-11-29 DIAGNOSIS — R23.2 FLUSHING: ICD-10-CM

## 2023-11-29 DIAGNOSIS — Z79.810 LONG TERM (CURRENT) USE OF SELECTIVE ESTROGEN RECEPTOR MODULATORS (SERMS): ICD-10-CM

## 2023-11-29 LAB
BASOPHILS # BLD AUTO: 0.05 K/UL — SIGNIFICANT CHANGE UP (ref 0–0.2)
BASOPHILS # BLD AUTO: 0.05 K/UL — SIGNIFICANT CHANGE UP (ref 0–0.2)
BASOPHILS NFR BLD AUTO: 0.9 % — SIGNIFICANT CHANGE UP (ref 0–2)
BASOPHILS NFR BLD AUTO: 0.9 % — SIGNIFICANT CHANGE UP (ref 0–2)
EOSINOPHIL # BLD AUTO: 0.08 K/UL — SIGNIFICANT CHANGE UP (ref 0–0.5)
EOSINOPHIL # BLD AUTO: 0.08 K/UL — SIGNIFICANT CHANGE UP (ref 0–0.5)
EOSINOPHIL NFR BLD AUTO: 1.5 % — SIGNIFICANT CHANGE UP (ref 0–6)
EOSINOPHIL NFR BLD AUTO: 1.5 % — SIGNIFICANT CHANGE UP (ref 0–6)
HCT VFR BLD CALC: 40 % — SIGNIFICANT CHANGE UP (ref 34.5–45)
HCT VFR BLD CALC: 40 % — SIGNIFICANT CHANGE UP (ref 34.5–45)
HGB BLD-MCNC: 13.3 G/DL — SIGNIFICANT CHANGE UP (ref 11.5–15.5)
HGB BLD-MCNC: 13.3 G/DL — SIGNIFICANT CHANGE UP (ref 11.5–15.5)
IMM GRANULOCYTES NFR BLD AUTO: 0.2 % — SIGNIFICANT CHANGE UP (ref 0–0.9)
IMM GRANULOCYTES NFR BLD AUTO: 0.2 % — SIGNIFICANT CHANGE UP (ref 0–0.9)
LYMPHOCYTES # BLD AUTO: 1.87 K/UL — SIGNIFICANT CHANGE UP (ref 1–3.3)
LYMPHOCYTES # BLD AUTO: 1.87 K/UL — SIGNIFICANT CHANGE UP (ref 1–3.3)
LYMPHOCYTES # BLD AUTO: 34.2 % — SIGNIFICANT CHANGE UP (ref 13–44)
LYMPHOCYTES # BLD AUTO: 34.2 % — SIGNIFICANT CHANGE UP (ref 13–44)
MCHC RBC-ENTMCNC: 30.2 PG — SIGNIFICANT CHANGE UP (ref 27–34)
MCHC RBC-ENTMCNC: 30.2 PG — SIGNIFICANT CHANGE UP (ref 27–34)
MCHC RBC-ENTMCNC: 33.3 G/DL — SIGNIFICANT CHANGE UP (ref 32–36)
MCHC RBC-ENTMCNC: 33.3 G/DL — SIGNIFICANT CHANGE UP (ref 32–36)
MCV RBC AUTO: 90.7 FL — SIGNIFICANT CHANGE UP (ref 80–100)
MCV RBC AUTO: 90.7 FL — SIGNIFICANT CHANGE UP (ref 80–100)
MONOCYTES # BLD AUTO: 0.48 K/UL — SIGNIFICANT CHANGE UP (ref 0–0.9)
MONOCYTES # BLD AUTO: 0.48 K/UL — SIGNIFICANT CHANGE UP (ref 0–0.9)
MONOCYTES NFR BLD AUTO: 8.8 % — SIGNIFICANT CHANGE UP (ref 2–14)
MONOCYTES NFR BLD AUTO: 8.8 % — SIGNIFICANT CHANGE UP (ref 2–14)
NEUTROPHILS # BLD AUTO: 2.97 K/UL — SIGNIFICANT CHANGE UP (ref 1.8–7.4)
NEUTROPHILS # BLD AUTO: 2.97 K/UL — SIGNIFICANT CHANGE UP (ref 1.8–7.4)
NEUTROPHILS NFR BLD AUTO: 54.4 % — SIGNIFICANT CHANGE UP (ref 43–77)
NEUTROPHILS NFR BLD AUTO: 54.4 % — SIGNIFICANT CHANGE UP (ref 43–77)
NRBC # BLD: 0 /100 WBCS — SIGNIFICANT CHANGE UP (ref 0–0)
NRBC # BLD: 0 /100 WBCS — SIGNIFICANT CHANGE UP (ref 0–0)
PLATELET # BLD AUTO: 152 K/UL — SIGNIFICANT CHANGE UP (ref 150–400)
PLATELET # BLD AUTO: 152 K/UL — SIGNIFICANT CHANGE UP (ref 150–400)
RBC # BLD: 4.41 M/UL — SIGNIFICANT CHANGE UP (ref 3.8–5.2)
RBC # BLD: 4.41 M/UL — SIGNIFICANT CHANGE UP (ref 3.8–5.2)
RBC # FLD: 12.7 % — SIGNIFICANT CHANGE UP (ref 10.3–14.5)
RBC # FLD: 12.7 % — SIGNIFICANT CHANGE UP (ref 10.3–14.5)
WBC # BLD: 5.46 K/UL — SIGNIFICANT CHANGE UP (ref 3.8–10.5)
WBC # BLD: 5.46 K/UL — SIGNIFICANT CHANGE UP (ref 3.8–10.5)
WBC # FLD AUTO: 5.46 K/UL — SIGNIFICANT CHANGE UP (ref 3.8–10.5)
WBC # FLD AUTO: 5.46 K/UL — SIGNIFICANT CHANGE UP (ref 3.8–10.5)

## 2023-11-29 PROCEDURE — 99214 OFFICE O/P EST MOD 30 MIN: CPT

## 2023-11-30 DIAGNOSIS — C79.51 SECONDARY MALIGNANT NEOPLASM OF BONE: ICD-10-CM

## 2023-11-30 LAB
ESTIMATED AVERAGE GLUCOSE: 103 MG/DL
HBA1C MFR BLD HPLC: 5.2 %

## 2023-12-04 ENCOUNTER — APPOINTMENT (OUTPATIENT)
Dept: ULTRASOUND IMAGING | Facility: CLINIC | Age: 58
End: 2023-12-04
Payer: COMMERCIAL

## 2023-12-04 ENCOUNTER — APPOINTMENT (OUTPATIENT)
Dept: RADIOLOGY | Facility: CLINIC | Age: 58
End: 2023-12-04
Payer: COMMERCIAL

## 2023-12-04 PROCEDURE — 76700 US EXAM ABDOM COMPLETE: CPT

## 2023-12-04 PROCEDURE — 77085 DXA BONE DENSITY AXL VRT FX: CPT

## 2023-12-11 ENCOUNTER — APPOINTMENT (OUTPATIENT)
Dept: HEPATOLOGY | Facility: CLINIC | Age: 58
End: 2023-12-11
Payer: COMMERCIAL

## 2023-12-11 VITALS
DIASTOLIC BLOOD PRESSURE: 75 MMHG | SYSTOLIC BLOOD PRESSURE: 109 MMHG | OXYGEN SATURATION: 96 % | HEART RATE: 79 BPM | RESPIRATION RATE: 15 BRPM | BODY MASS INDEX: 25.48 KG/M2 | TEMPERATURE: 97.1 F | WEIGHT: 142 LBS | HEIGHT: 62.5 IN

## 2023-12-11 PROCEDURE — 99214 OFFICE O/P EST MOD 30 MIN: CPT

## 2023-12-12 ENCOUNTER — NON-APPOINTMENT (OUTPATIENT)
Age: 58
End: 2023-12-12

## 2023-12-12 DIAGNOSIS — R97.8 OTHER ABNORMAL TUMOR MARKERS: ICD-10-CM

## 2023-12-12 LAB
AFP-TM SERPL-MCNC: <1.8 NG/ML
ALBUMIN SERPL ELPH-MCNC: 4.1 G/DL
ALP BLD-CCNC: 37 U/L
ALT SERPL-CCNC: 14 U/L
ANION GAP SERPL CALC-SCNC: 11 MMOL/L
AST SERPL-CCNC: 14 U/L
BILIRUB SERPL-MCNC: 0.3 MG/DL
BUN SERPL-MCNC: 15 MG/DL
CALCIUM SERPL-MCNC: 9.3 MG/DL
CANCER AG19-9 SERPL-ACNC: 53 U/ML
CEA SERPL-MCNC: 1.6 NG/ML
CHLORIDE SERPL-SCNC: 104 MMOL/L
CO2 SERPL-SCNC: 26 MMOL/L
CREAT SERPL-MCNC: 0.7 MG/DL
EGFR: 101 ML/MIN/1.73M2
GLUCOSE SERPL-MCNC: 75 MG/DL
HBV CORE IGG+IGM SER QL: NONREACTIVE
INR PPP: 0.98 RATIO
POTASSIUM SERPL-SCNC: 4 MMOL/L
PROT SERPL-MCNC: 6.4 G/DL
PT BLD: 11.1 SEC
SODIUM SERPL-SCNC: 141 MMOL/L

## 2023-12-18 ENCOUNTER — APPOINTMENT (OUTPATIENT)
Dept: MRI IMAGING | Facility: CLINIC | Age: 58
End: 2023-12-18

## 2023-12-19 LAB — ACARBOXYPROTHROMBIN SERPL-MCNC: 0.3 NG/ML

## 2023-12-23 ENCOUNTER — NON-APPOINTMENT (OUTPATIENT)
Age: 58
End: 2023-12-23

## 2023-12-29 ENCOUNTER — NON-APPOINTMENT (OUTPATIENT)
Age: 58
End: 2023-12-29

## 2024-01-22 ENCOUNTER — APPOINTMENT (OUTPATIENT)
Dept: MRI IMAGING | Facility: IMAGING CENTER | Age: 59
End: 2024-01-22
Payer: COMMERCIAL

## 2024-01-22 ENCOUNTER — OUTPATIENT (OUTPATIENT)
Dept: OUTPATIENT SERVICES | Facility: HOSPITAL | Age: 59
LOS: 1 days | End: 2024-01-22
Payer: COMMERCIAL

## 2024-01-22 DIAGNOSIS — Z00.8 ENCOUNTER FOR OTHER GENERAL EXAMINATION: ICD-10-CM

## 2024-01-22 DIAGNOSIS — O00.90 UNSPECIFIED ECTOPIC PREGNANCY WITHOUT INTRAUTERINE PREGNANCY: Chronic | ICD-10-CM

## 2024-01-22 DIAGNOSIS — Z90.13 ACQUIRED ABSENCE OF BILATERAL BREASTS AND NIPPLES: Chronic | ICD-10-CM

## 2024-01-22 DIAGNOSIS — M12.9 ARTHROPATHY, UNSPECIFIED: Chronic | ICD-10-CM

## 2024-01-22 DIAGNOSIS — R97.8 OTHER ABNORMAL TUMOR MARKERS: ICD-10-CM

## 2024-01-22 DIAGNOSIS — K76.9 LIVER DISEASE, UNSPECIFIED: ICD-10-CM

## 2024-01-22 DIAGNOSIS — Z33.2 ENCOUNTER FOR ELECTIVE TERMINATION OF PREGNANCY: Chronic | ICD-10-CM

## 2024-01-22 PROCEDURE — 74183 MRI ABD W/O CNTR FLWD CNTR: CPT

## 2024-01-22 PROCEDURE — A9585: CPT

## 2024-01-22 PROCEDURE — 74183 MRI ABD W/O CNTR FLWD CNTR: CPT | Mod: 26

## 2024-04-30 ENCOUNTER — OUTPATIENT (OUTPATIENT)
Dept: OUTPATIENT SERVICES | Facility: HOSPITAL | Age: 59
LOS: 1 days | Discharge: HOME | End: 2024-04-30

## 2024-04-30 DIAGNOSIS — M12.9 ARTHROPATHY, UNSPECIFIED: Chronic | ICD-10-CM

## 2024-04-30 DIAGNOSIS — Z90.13 ACQUIRED ABSENCE OF BILATERAL BREASTS AND NIPPLES: Chronic | ICD-10-CM

## 2024-04-30 DIAGNOSIS — C50.919 MALIGNANT NEOPLASM OF UNSPECIFIED SITE OF UNSPECIFIED FEMALE BREAST: ICD-10-CM

## 2024-04-30 DIAGNOSIS — O00.90 UNSPECIFIED ECTOPIC PREGNANCY WITHOUT INTRAUTERINE PREGNANCY: Chronic | ICD-10-CM

## 2024-04-30 DIAGNOSIS — Z33.2 ENCOUNTER FOR ELECTIVE TERMINATION OF PREGNANCY: Chronic | ICD-10-CM

## 2024-05-09 ENCOUNTER — APPOINTMENT (OUTPATIENT)
Dept: HEPATOLOGY | Facility: CLINIC | Age: 59
End: 2024-05-09
Payer: MEDICARE

## 2024-05-09 VITALS
SYSTOLIC BLOOD PRESSURE: 124 MMHG | WEIGHT: 142 LBS | BODY MASS INDEX: 25.48 KG/M2 | HEART RATE: 66 BPM | HEIGHT: 62.5 IN | OXYGEN SATURATION: 97 % | RESPIRATION RATE: 15 BRPM | TEMPERATURE: 98.1 F | DIASTOLIC BLOOD PRESSURE: 83 MMHG

## 2024-05-09 DIAGNOSIS — K76.9 LIVER DISEASE, UNSPECIFIED: ICD-10-CM

## 2024-05-09 PROCEDURE — 99214 OFFICE O/P EST MOD 30 MIN: CPT

## 2024-05-09 NOTE — HISTORY OF PRESENT ILLNESS
[de-identified] : Ms. Villa is a 57 yo F with overweight BMI, osteoporosis, migraines, history of left breast invasive well-differentiated ductal carcinoma (s/p bilateral nipple sparing mastectomies and bilateral sentinel axillary lymph node dissections 17, pT1bN0, stage IA, ER/UT+, HER-2/geraldo negative, s/p tamoxifen 2018-10/2021, switched to anastrazole 10/2021-3/2022 but intolerant, then switched back to tamoxifen 2022- with plan for total 10 years of treatment due to high risk BCI), and a history of chronic HCV (likely acquired from injections while in the [place], genotype 1B). She underwent liver biopsy at [facility] in  showing F3-4 fibrosis and pre-treatment FibroScan on 1/6/15 showing F3-4 fibrosis. She was treated with Harvoni from 3/24/15-6/16/15 with SVR, with subsequent regression of fibrosis with most recent FibroScan showing F0-1 fibrosis, though liver morphology is still cirrhotic on ultrasounds. She has no history of overt hepatic decompensations (i.e., no history of variceal hemorrhage, ascites, hepatic encephalopathy, or jaundice), no history of HCC, and no upper GI varices on last EGD (23).  FibroScan (23): Median liver stiffness 4.2 kPA (normal, consistent with F0-1 fibrosis) and CAP score 176 dB/m (normal, consistent with S0 steatosis).  She was last seen by me on 23 and is here today for routine follow-up. She reports feeling well today with no new complaints. She spent several months in [place] before [place] and is planning to return for 10 days soon. She will be traveling to [place] for her son's wedding in mid- but returning in early July.  Her mother  in  of primary liver cancer (possibly cholangiocarcinoma, but she is unsure). She had had a prior hepatectomy at [facility] but the cancer recurred. Her mother also had a remote history of breast cancer. Her maternal grandfather also had liver cancer (unsure what type). Her  had HCV without advanced fibrosis treated successfully by Dr. Johns years ago as part of a clinical trial.  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 She is . She has 3 children and a young granddaughter. Her daughter and granddaughter live in [place]. Her son lives in [place] as well and is getting  on 24 in [place]. She and her  spend the winter in [facility] in [place] (in [facility]).

## 2024-05-09 NOTE — ASSESSMENT
[FreeTextEntry1] : # Right hepatic lobe lesions: - Last abdominal US (12/4/23) showed new 0.6 and 0.4 cm right hepatic lobe hyperechoic lesions not seen on her prior US (5/2023). - Recent MRI abdomen (1/22/24) showed multiple subcentimeter arterially enhancing foci without washout or diffusion restriction and measuring up to 0.7 cm, all indeterminate (LR-3), as well as a 2.6 cm left hepatic lobe cyst. - Prior tumor markers (12/2023) with normal AFP and DCP and mildly elevated CA 19-9 (can be non-specific). - Repeat MRI abdomen ordered today for 6 month surveillance (7/2024).  # Chronic hepatitis C with pre-treatment compensated cirrhosis, now s/p Harvoni (completed in 2015) with SVR: - She has had interval regression of fibrosis based on FibroScans (including most recent on 6/23/23) which showed normal median liver stiffness suggesting F0-1 fibrosis. However, her liver morphology still appears cirrhotic by imaging. No radiologic features of portal hypertension. - Based on her most recent labs, she has normal liver chemistries and preserved liver synthetic function, but with mild thrombocytopenia. She did repeat labs earlier today for continued q6 month surveillance. - We have discussed that she will likely have anti-HCV antibody positivity lifelong but that this does not confer protection against reinfection. - We have discussed the need for lifelong screening for HCC every 6 months, but currently with plan for MRI again as above. - No upper GI varices seen on last EGD (7/18/23). Repeat in 3 years, or would start carvedilol if she develops radiologic signs of portal hypertension in the interim.  # Chronic use of tamoxifen: We have discussed that this medication can lead to a risk of hepatic steatosis and steatohepatitis that could potentially lead to worsening liver disease. However, based on her reassuring labs, imaging, and prior FibroScan as above, it is reasonable to continue therapy for now (benefits outweigh risks) with continued monitoring including labs every 6 months and imaging every 6 months.  # Health maintenance: - HAV immune. - HBV immune. - Ms. GOMEZ was counseled to: abstain from alcohol and all illicit drugs; avoid use of herbal and dietary supplements due to potential hepatotoxicity; limit use of acetaminophen to <2 grams per day; avoid use of nonsteroidal antiinflammatory drugs (NSAIDs) as these can lead to diuretic resistance and precipitate renal dysfunction in patients with advanced liver disease; avoid eating any unpasteurized dairy products; avoid eating any raw or undercooked eggs, fish, poultry, or meat; and avoid eating raw/steamed oysters or other shellfish to avoid risk of Vibrio infection.  Next follow-up: 6 months

## 2024-05-09 NOTE — PHYSICAL EXAM
[Non-Tender] : non-tender [Smooth] : smooth [General Appearance - Alert] : alert [General Appearance - In No Acute Distress] : in no acute distress [General Appearance - Well Nourished] : well nourished [General Appearance - Well Developed] : well developed [General Appearance - Well-Appearing] : healthy appearing [Sclera] : the sclera and conjunctiva were normal [Hearing Threshold Finger Rub Not [place]] : hearing was normal [Oropharynx] : the oropharynx was normal [Neck Appearance] : the appearance of the neck was normal [Neck Cervical Mass (___cm)] : no neck mass was observed [Jugular Venous Distention Increased] : there was no jugular-venous distention [Respiration, Rhythm And Depth] : normal respiratory rhythm and effort [Exaggerated Use Of Accessory Muscles For Inspiration] : no accessory muscle use [Auscultation Breath Sounds / Voice Sounds] : lungs were clear to auscultation bilaterally [Heart Rate And Rhythm] : heart rate was normal and rhythm regular [Heart Sounds] : normal S1 and S2 [Murmurs] : no murmurs [Edema] : there was no peripheral edema [Bowel Sounds] : normal bowel sounds [Abdomen Soft] : soft [Abdomen Tenderness] : non-tender [Abdomen Mass (___ Cm)] : no abdominal mass palpated [Abnormal Walk] : normal gait [Nail Clubbing] : no clubbing  or cyanosis of the fingernails [Involuntary Movements] : no involuntary movements were seen [Skin Color & Pigmentation] : normal skin color and pigmentation [Skin Turgor] : normal skin turgor [] : no rash [Oriented To Time, Place, And Person] : oriented to person, place, and time [Impaired Insight] : insight and judgment were intact [Affect] : the affect was normal [Mood] : the mood was normal [Memory Recent] : recent memory was not impaired [Memory Remote] : remote memory was not impaired [Scleral Icterus] : No Scleral Icterus [Spider Angioma] : No spider angioma(s) were observed [Abdominal  Ascites] : no ascites [Splenomegaly] : no splenomegaly [Caput Medusae] : no caput medusae observed [Asterixis] : no asterixis observed [Jaundice] : No jaundice [Palmar Erythema] : no Palmar Erythema [FreeTextEntry1] : +healed surgical scar, +striae

## 2024-05-13 ENCOUNTER — TRANSCRIPTION ENCOUNTER (OUTPATIENT)
Age: 59
End: 2024-05-13

## 2024-05-13 DIAGNOSIS — K74.60 UNSPECIFIED CIRRHOSIS OF LIVER: ICD-10-CM

## 2024-05-13 LAB
AFP-TM SERPL-MCNC: <1.8 NG/ML
ALBUMIN SERPL ELPH-MCNC: 4.4 G/DL
ALP BLD-CCNC: 33 U/L
ALT SERPL-CCNC: 16 U/L
ANION GAP SERPL CALC-SCNC: 12 MMOL/L
AST SERPL-CCNC: 19 U/L
BASOPHILS # BLD AUTO: 0.06 K/UL
BASOPHILS NFR BLD AUTO: 1.2 %
BILIRUB SERPL-MCNC: 0.4 MG/DL
BUN SERPL-MCNC: 13 MG/DL
CALCIUM SERPL-MCNC: 9.2 MG/DL
CHLORIDE SERPL-SCNC: 106 MMOL/L
CO2 SERPL-SCNC: 25 MMOL/L
CREAT SERPL-MCNC: 0.65 MG/DL
EGFR: 103 ML/MIN/1.73M2
EOSINOPHIL # BLD AUTO: 0.11 K/UL
EOSINOPHIL NFR BLD AUTO: 2.2 %
GLUCOSE SERPL-MCNC: 88 MG/DL
HCT VFR BLD CALC: 41.6 %
HGB BLD-MCNC: 13.2 G/DL
IMM GRANULOCYTES NFR BLD AUTO: 0.2 %
INR PPP: 0.96 RATIO
LYMPHOCYTES # BLD AUTO: 1.81 K/UL
LYMPHOCYTES NFR BLD AUTO: 35.9 %
MAN DIFF?: NORMAL
MCHC RBC-ENTMCNC: 29.3 PG
MCHC RBC-ENTMCNC: 31.7 GM/DL
MCV RBC AUTO: 92.2 FL
MONOCYTES # BLD AUTO: 0.5 K/UL
MONOCYTES NFR BLD AUTO: 9.9 %
NEUTROPHILS # BLD AUTO: 2.55 K/UL
NEUTROPHILS NFR BLD AUTO: 50.6 %
PLATELET # BLD AUTO: 162 K/UL
POTASSIUM SERPL-SCNC: 4.2 MMOL/L
PROT SERPL-MCNC: 6.6 G/DL
PT BLD: 10.9 SEC
RBC # BLD: 4.51 M/UL
RBC # FLD: 13.4 %
SODIUM SERPL-SCNC: 143 MMOL/L
WBC # FLD AUTO: 5.04 K/UL

## 2024-05-21 ENCOUNTER — NON-APPOINTMENT (OUTPATIENT)
Age: 59
End: 2024-05-21

## 2024-05-22 ENCOUNTER — APPOINTMENT (OUTPATIENT)
Dept: HEMATOLOGY ONCOLOGY | Facility: CLINIC | Age: 59
End: 2024-05-22
Payer: MEDICARE

## 2024-05-22 ENCOUNTER — APPOINTMENT (OUTPATIENT)
Dept: INFUSION THERAPY | Facility: HOSPITAL | Age: 59
End: 2024-05-22

## 2024-05-22 VITALS
OXYGEN SATURATION: 99 % | SYSTOLIC BLOOD PRESSURE: 128 MMHG | HEIGHT: 61.42 IN | BODY MASS INDEX: 26.91 KG/M2 | DIASTOLIC BLOOD PRESSURE: 82 MMHG | TEMPERATURE: 97.5 F | RESPIRATION RATE: 16 BRPM | HEART RATE: 71 BPM | WEIGHT: 144.4 LBS

## 2024-05-22 DIAGNOSIS — C50.919 MALIGNANT NEOPLASM OF UNSPECIFIED SITE OF UNSPECIFIED FEMALE BREAST: ICD-10-CM

## 2024-05-22 PROCEDURE — 99214 OFFICE O/P EST MOD 30 MIN: CPT

## 2024-05-22 PROCEDURE — G2211 COMPLEX E/M VISIT ADD ON: CPT

## 2024-05-22 RX ORDER — ASPIRIN ENTERIC COATED TABLETS 81 MG 81 MG/1
81 TABLET, DELAYED RELEASE ORAL
Qty: 90 | Refills: 2 | Status: ACTIVE | COMMUNITY
Start: 2019-12-13 | End: 1900-01-01

## 2024-05-22 RX ORDER — TAMOXIFEN CITRATE 20 MG/1
20 TABLET, FILM COATED ORAL DAILY
Qty: 90 | Refills: 1 | Status: ACTIVE | COMMUNITY
Start: 2022-04-29 | End: 1900-01-01

## 2024-05-22 NOTE — CONSULT LETTER
[Dear  ___] : Dear  [unfilled], [Consult Letter:] : I had the pleasure of evaluating your patient, [unfilled]. [Please see my note below.] : Please see my note below. [Consult Closing:] : Thank you very much for allowing me to participate in the care of this patient.  If you have any questions, please do not hesitate to contact me. [Sincerely,] : Sincerely, [DrDayami  ___] : Dr. BOWLING [DrDayami ___] : Dr. BOWLING [FreeTextEntry3] : Tayler Ellis M.D.\par   of Medicine\par  [facility]\par  [facility]\par  [address] [place] [address]\par  [address]\par  Tele # [phone]; Fax [phone]

## 2024-05-22 NOTE — REASON FOR VISIT
[Follow-Up Visit] : a follow-up [Other: _____] : [unfilled] [FreeTextEntry2] : Breast ca ER/WA POSITIVE, HER-2 NEGATIVE

## 2024-05-22 NOTE — PHYSICAL EXAM
[Fully active, able to carry on all pre-disease performance without restriction] : Status 0 - Fully active, able to carry on all pre-disease performance without restriction [Normal] : affect appropriate [de-identified] : b/ nipple sparing mastectomies with reconstruction. No CW or axillary nodules [de-identified] : healed MAGALYS flap.

## 2024-05-22 NOTE — HISTORY OF PRESENT ILLNESS
[Treatment Protocol] : Treatment Protocol [1 - Distress Level] : Distress Level: 1 [Patient given social work contact information and resource sheet] : Patient was given social work contact information and resource sheet [IA] : IA [de-identified] : Ms. TOMER GOMEZ is a 55 year old female here for an evaluation of breast cancer. Her oncologic history is as follows:  She underwent a routine screening mammogram on 11/1/17 which showed an indeterminate 0.6 cm mass in the left breast. She underwent left breast biopsy on 11/15/17 which showed invasive well-differentiated ductal carcinoma, [place] score 4/9, with low-grade DCIS. ER/MD 90%, HER-2/geraldo negative.  She underwent bilateral mastectomies and bilateral sentinel axillary lymph nodes on 12/26/17. Pathology from the right side showed radial scar and lymph nodes were negative. Pathology from the left side showed invasive ductal carcinoma, well-differentiated, 0.7 cm, with low-grade DCIS. Margins were negative. 2 sentinel lymph nodes were negative. Oncotype DX recurrence score was 14  3/2021: She is tolerating tamoxifen well with  + mild tolerable hot flashes. good compliance. No mood changes, wt gain, vaginal dryness, SOB, chest pain, Leg swelling or clotting issues. LMP date 5/2019, periods are irregular since starting tamoxifen , no intermittent spotting, no vaginal discharge. Using contraception condoms. Energy and appetite good, wt stable. Working full time from home but leaving job due to stress.  She reports hair loss. she saw derm, started biotin which is helping. TSh ok GYN -  Dr Zulema Del Cid, last seen  8/2021, thickened endometrium. s/P bx. She had a polyp and is waiting for surgery after second vaccine.  Opthal 5/2019 no need for breast imaging.  Has Hepatitis C which is stable no issues. Saw GI doctor Emma Mojica in May. Her mom passed 5/2021, had liver cancer  9/2021 Patient has not had a menstrual period in 2 years.  Chemically she is postmenopausal.  We discussed that she has been on tamoxifen for for 3-1/2 years.  At this point I recommend to switch to aromatase inhibitors.  Typically Arimidex 1 mg daily is used.  Side effects of aromatase inhibitors were reviewed.  Patient was very concerned about bone related side effects.  She reports her aunt had severe complications with Arimidex and her friend was put on bone infusions.  Her aunt suffered with persistent pain despite stopping Arimidex.  We discussed that she is experiencing endometrial issues and has pre-existing hepatic issues and therefore my strong recommendation is to switch tamoxifen to Arimidex at this point but patient was very nervous about making the switch.  We will continue tamoxifen for now and will address this again at the next visit.  10/2021:  switched to anastrozole  today 3/2022:    She reports feeling sad, depressed since her mom passed. Saw psyche recently and started celexa in 11/2021 She started anastrozole in October 2021. She reports diffuse body pain since December 2021, migraine gotten severe since then, saw neuro and s/p botox x 2 with mild relief Hot flashes are worse SHe is s/p Prolia 12/2021 for osteoporosis and body pain much worse since then She is very tearful and asking for LTD. We discussed unlikely to get LTD on the basis of stage I BREAST CA and meds s/e are transient. She will d/w psychiatrist to get disability based on depression We rec to stop anastrozole x 2 weeks wait 2 weeks to see if sx improve I rec to do bone scan but she wants to hold off if pain persist after 2 weeks, will do a bone scan If pain resolve- will switch to tamoxifen or exemestane SHe is worried about uterine toxicity from tamoxifen and wishes to try exemestane teb 2 weeks disability paperwork filled out per her request today  4/8/22:  Anastrozole stopped 3/2022 due to s/e She reports pain is 40% improved hot flashes are better migraine and mood better agree to switch to exemestane  Rec bone scan to r/o mets DEXA -2.5 OSTEOPOROSIS, Prolia started 12/2021, she is on ca+ vit D. Monitor for ONJ and hypocalcemia,   [de-identified] : \par  GENETICS: GENE DX VUS in PALB2 [FreeTextEntry1] : started Tamoxifen February 16,2018 [de-identified] : Ms. TOMER GOMEZ is here for a follow up visit today for left breast cancer on endocrine therapy since February 16,2018. Switched to anastrozole 10/2021. Prolia started 12/2021. Anastrozole stopped 3/2022 due to s/e. Plan for 10 yrs of endocrine therapy due to high BCI  She is switched to tamoxifen 4/2022, asa 81 with it. Bone scan 5/2022 SONAL She is tolerating tamoxifen well with tolerable hot flashes. Good compliance. She denies mood changes, wt gain, vaginal dryness, SOB, chest pain, leg swelling or clotting issues. Her energy and appetite is good, wt stable GYN - 8/2022, 8/2023, reminded to see annually  Breast imaging: not needed Opthal: once a yr DEXA osteoporosis : Patient started Prolia 12/2021.  No dental issues.  She is on calcium vitamin D. DEXA due now dexa 12/2023 showed improvement. Continue prolia for now.

## 2024-05-22 NOTE — ASSESSMENT
[Curative] : Goals of care discussed with patient: Curative [FreeTextEntry1] : This is a 56-year-old premenopausal lady who is diagnosed with stage IA (T1b N0) left breast well-differentiated invasive ductal carcinoma ER positive, OH positive and HER-2/geraldo negative. She underwent bilateral nipple sparing mastectomies and is healing well. Oncotype DX score is 14 which predicts 9% risk of distant recurrence after 5 years of tamoxifen. Her medical h/o is significant for treated HCV with undetectable viral load. Started Ann February 16,2018  - Breast ca: Started Ann February 16,2018. Switched to anastrozole 10/2021. Prolia started 12/2021. Anastrozole stopped 3/2022 due to s/e. Bone scan 5/2022 SONAL She is switched back to tamoxifen 4/2022. She reports that side effects are significantly better. Plan to continue tamoxifen for 10 years due to high risk BCI  Recommend to see GYN and opthal f/u annually due to risk of endometrial ca and cataracts respectively. No breast imaging needed.   - Mild hot flashes: 2/2 tamoxifen. Advised to wear layers and use fan prn. Consider Effexor if get worse.  -Osteoporosis: Prolia started 12/2021. PROLIA TODAY dexa 12/2023 showed improvement. Continue prolia for now.  Continue calcium and vitamin D. Continue dental checkups every 6 months to monitor for osteonecrosis. Blood work every 6 to 12 months to monitor for hypocalcemia.  - Patient is aware of signs and symptoms of DVT/PE. Patient will report if she develops acute onset chest pain shortness of breath, leg swelling or calf pain.  -Hepatitis C Cirrhosis: Has mild thrombocytopenia 2/2 cirrhosis. LFT and AF normal  - Low Vitamin D: concern for worsening bone loss due to low vit D. Discussed to increase Vit D intake   RTC 6 m. With Prolia.

## 2024-05-23 DIAGNOSIS — C79.51 SECONDARY MALIGNANT NEOPLASM OF BONE: ICD-10-CM

## 2024-06-27 NOTE — ASU DISCHARGE PLAN (ADULT/PEDIATRIC) - NSDISCH_ACTIVITY_ENDO_ALL_CORE_FT
As you may have been given sedative drugs and medications, you should not drive or operate heavy machinery the next 24 hours. You should avoid any heavy lifting, straining or running today. To the extent possible, defer any important decisions for the next 24 hours. Alert

## 2024-06-28 NOTE — ASU PATIENT PROFILE, ADULT - AS [facility]
ED Provider Note    CHIEF COMPLAINT  Chief Complaint   Patient presents with    Flu Like Symptoms     Headache, n/v, cough, generalized abd pain x3 days    7/10 headache, 7/10 abd pain        EXTERNAL RECORDS REVIEWED  Patient's last encounter was a telemedicine visit in March 2024.  This was a follow-up visit on his breast cancer.  Patient is on tamoxifen.  Outpatient office visit in the pulmonary clinic by physicians assistant in February 2024.  Patient has a history of obstructive sleep apnea.  Last ED visit was in August of last year patient was seen for rash.    HPI/ROS  LIMITATION TO HISTORY   Select: : None  OUTSIDE HISTORIAN(S):    Tiarra Chavez is a 46 y.o. female who presents to the emergency department accompanied by her  with a chief complaint of headache, nausea vomiting, fever, cough, abdominal pain for the last 3 days.  She has a history of breast cancer and is on tamoxifen which she reports is pretty hard on her.  She has been more constipated lately.  She denies any urinary symptoms.  Her urine sample at bedside is quite dark, concentrated.  She reports a fever yesterday as high as 102.  Today, she has been afebrile and she denies taking antipyretics today.  Yesterday in the previous day, she was alternating naproxen and Tylenol.  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    PAST MEDICAL HISTORY   has a past medical history of Allergic rhinitis (4/7/2011), Cancer ([facility]) (2020), Headache(784.0), Palpitations, Pernicious anemia, Snoring, and Urinary incontinence.    SURGICAL HISTORY   has a past surgical history that includes lumpectomy (2001); vaginal perineal exam repair (5/14/2012); other (Left, 1981); mastectomy, simple, complete (Bilateral, 3/26/2020); node biopsy sentinel (Right, 3/26/2020); breast biopsy (Right, 01/06/2020); and mass excision general (Left, 1/10/2022).    FAMILY HISTORY  Family History  "  Problem Relation Age of Onset    Hypertension Mother     Thyroid Mother         Hypothyroid    Hyperlipidemia Father     Cancer Father         T cell lymphoma    Cancer Paternal Uncle         lymphoma    Heart Disease Maternal Grandfather         MI 40 yo, unknown RF.       SOCIAL HISTORY  Social History     Tobacco Use    Smoking status: Never    Smokeless tobacco: Never   Vaping Use    Vaping status: Never Used   Substance and Sexual Activity    Alcohol use: Never    Drug use: Not Currently     Types: Marijuana, Oral     Comment: couple times a week     Sexual activity: Yes     Partners: Male     Birth control/protection: Condom, Pill       CURRENT MEDICATIONS  Home Medications       Reviewed by Joanie Contreras R.N. (Registered Nurse) on 06/28/24 at 0755  Med List Status: Partial     Medication Last Dose Status   CALCIUM-VITAMIN D PO  Active   Cyanocobalamin (VITAMIN B 12 PO)  Active   cyclobenzaprine (FLEXERIL) 10 mg Tab  Active   EPINEPHrine 0.3 MG/0.3ML Solution Prefilled Syringe  Active   folic acid (FOLVITE) 1 MG TABS  Active   hydrocortisone (ANUSOL-HC) 25 MG Suppos  Active   lidocaine (LIDODERM) 5 % Patch  Active   Multiple Vitamin (MULTI-VITAMIN DAILY PO)  Active   ondansetron (ZOFRAN) 4 MG Tab tablet  Active   tamoxifen (NOLVADEX) 20 MG tablet  Active                  Audit from Redirected Encounters    **Home medications have not yet been reviewed for this encounter**         ALLERGIES  Allergies   Allergen Reactions    Shellfish Allergy Hives    Nickel Itching     rash    Trazodone        PHYSICAL EXAM  VITAL SIGNS: /64   Pulse 86   Temp 36.2 °C (97.1 °F) (Temporal)   Resp 16   Ht 1.676 m (5' 6\")   Wt 71.8 kg (158 lb 4.6 oz)   SpO2 93%   BMI 25.55 kg/m²    Vitals reviewed.  Constitutional: Patient is oriented to person, place, and time. Appears well-developed and well-nourished. Mild distress.    Head: Normocephalic and atraumatic.   Ears: Normal external ears bilaterally. "   Mouth/Throat: Oropharynx is clear and moist, no exudates.   Eyes: Conjunctivae are normal. Pupils are equal, round  Neck: Normal range of motion.   Cardiovascular: Normal rate, regular rhythm and normal heart sounds.  Pulmonary/Chest: Effort normal and breath sounds normal. No respiratory distress, no wheezes, rhonchi, or rales.  Abdominal: Soft. Bowel sounds are normal. There is diffuse tenderness. No rebound or guarding, or peritoneal signs, no masses. No CVA tenderness.  Musculoskeletal: No edema and no tenderness.   Lymphadenopathy: No cervical adenopathy.   Neurological: Normal gait. No focal deficits.   Skin: Skin is warm and dry. No erythema. No pallor.   Psychiatric: Patient has a normal mood and affect.     EKG/LABS  Results for orders placed or performed during the hospital encounter of 06/28/24   CBC WITH DIFFERENTIAL   Result Value Ref Range    WBC 13.1 (H) 4.8 - 10.8 K/uL    RBC 3.99 (L) 4.20 - 5.40 M/uL    Hemoglobin 13.8 12.0 - 16.0 g/dL    Hematocrit 41.6 37.0 - 47.0 %    .3 (H) 81.4 - 97.8 fL    MCH 34.6 (H) 27.0 - 33.0 pg    MCHC 33.2 32.2 - 35.5 g/dL    RDW 45.1 35.9 - 50.0 fL    Platelet Count 343 164 - 446 K/uL    MPV 9.2 9.0 - 12.9 fL    Neutrophils-Polys 86.50 (H) 44.00 - 72.00 %    Lymphocytes 7.80 (L) 22.00 - 41.00 %    Monocytes 4.80 0.00 - 13.40 %    Eosinophils 0.20 0.00 - 6.90 %    Basophils 0.20 0.00 - 1.80 %    Immature Granulocytes 0.50 0.00 - 0.90 %    Nucleated RBC 0.00 0.00 - 0.20 /100 WBC    Neutrophils (Absolute) 11.31 (H) 1.82 - 7.42 K/uL    Lymphs (Absolute) 1.02 1.00 - 4.80 K/uL    Monos (Absolute) 0.62 0.00 - 0.85 K/uL    Eos (Absolute) 0.02 0.00 - 0.51 K/uL    Baso (Absolute) 0.02 0.00 - 0.12 K/uL    Immature Granulocytes (abs) 0.06 0.00 - 0.11 K/uL    NRBC (Absolute) 0.00 K/uL   COMP METABOLIC PANEL   Result Value Ref Range    Sodium 139 135 - 145 mmol/L    Potassium 4.4 3.6 - 5.5 mmol/L    Chloride 103 96 - 112 mmol/L    Co2 24 20 - 33 mmol/L    Anion Gap 12.0 7.0  - 16.0    Glucose 117 (H) 65 - 99 mg/dL    Bun 12 8 - 22 mg/dL    Creatinine 0.70 0.50 - 1.40 mg/dL    Calcium 8.9 8.5 - 10.5 mg/dL    Correct Calcium 8.7 8.5 - 10.5 mg/dL    AST(SGOT) 7 (L) 12 - 45 U/L    ALT(SGPT) 6 2 - 50 U/L    Alkaline Phosphatase 61 30 - 99 U/L    Total Bilirubin 0.6 0.1 - 1.5 mg/dL    Albumin 4.3 3.2 - 4.9 g/dL    Total Protein 7.9 6.0 - 8.2 g/dL    Globulin 3.6 (H) 1.9 - 3.5 g/dL    A-G Ratio 1.2 g/dL   LIPASE   Result Value Ref Range    Lipase 23 11 - 82 U/L   HCG QUAL SERUM   Result Value Ref Range    Beta-Hcg Qualitative Serum Negative Negative   URINALYSIS,CULTURE IF INDICATED    Specimen: Urine   Result Value Ref Range    Color DK Yellow     Character Clear     Specific Gravity 1.029 <1.035    Ph 6.0 5.0 - 8.0    Glucose Negative Negative mg/dL    Ketones >=160 Negative mg/dL    Protein 30 (A) Negative mg/dL    Bilirubin Negative Negative    Urobilinogen, Urine 1.0 Negative    Nitrite Negative Negative    Leukocyte Esterase Trace (A) Negative    Occult Blood Moderate (A) Negative    Micro Urine Req Microscopic    URINE MICROSCOPIC (W/UA)   Result Value Ref Range    WBC 0-2 /hpf    RBC 10-20 (A) /hpf    Bacteria Negative None /hpf    Epithelial Cells Negative /hpf    Hyaline Cast 3-5 (A) /lpf   ESTIMATED GFR   Result Value Ref Range    GFR (CKD-EPI) 109 >60 mL/min/1.73 m 2   POC CoV-2, FLU A/B, RSV by PCR   Result Value Ref Range    POC Influenza A RNA, PCR Negative Negative    POC Influenza B RNA, PCR Negative Negative    POC RSV, by PCR Negative Negative    POC SARS-CoV-2, PCR NotDetected        RADIOLOGY/PROCEDURES   I have independently interpreted the diagnostic imaging associated with this visit and am waiting the final reading from the radiologist.   [facility]    Radiologist interpretation:  US-PELVIC TRANSABDOMINAL ONLY   Final Result      1.  Fibroid uterus.   2.  Left ovarian 6.8 x 5.6 x 6.2 cm complex cystic lesion with low-level echoes  most consistent with a hemorrhagic cyst. Follow-up ultrasound may be of interest to document resolution.      CT-ABDOMEN-PELVIS WITH   Final Result      1.  Cystic lesion in the LEFT adnexa with adjacent fluid collection the pelvis somewhat suspicious for possible pelvic inflammatory disease though both the adnexal cystic lesion in the fluid are themselves nonspecific. Suggest further assessment with    pelvic ultrasound.   2.  11 mm LEFT hepatic lesion unchanged in over 2 years and very likely a cyst or hemangioma      DX-CHEST-PORTABLE (1 VIEW)   Final Result      No acute cardiac or pulmonary abnormalities are identified.          COURSE & MEDICAL DECISION MAKING    ASSESSMENT, COURSE AND PLAN  Care Narrative:     This is a pleasant 46-year-old female.  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  She has reassuring vital signs.  She is afebrile currently.  Again satting 100%.  She is presenting with flulike symptoms.  Headache, nausea vomiting, cough, body aches, fever.  She has had nausea and vomiting as well.  Her urine is dark but she is denying any dysuria.  Labs were ordered including a quadrivalent swab by triage.  CBC is available and she does have a increase in her white blood cell count of 13.1.  H&H 13 and 41.  She is on tamoxifen.  Given her decreased oral intake, concentrated urine, nausea vomiting, I have ordered IV fluids as well as antiemetics.    9:21 AM data reviewed.  No infiltrate noted on x-ray.  He is again previously mentioned, white blood cell count is elevated there is a shift.  Chemistry shows a glucose of 117, AST is low, 7 otherwise, her CMP is reassuring.  Urine analysis shows occult blood with increased protein, trace leukocyte esterase with elevated RBCs, 10-20.  No nitrites, no bacteria.    11:05 AM patient is reevaluated at the bedside.  She is reporting improvement in her headache.  She still having diffuse abdominal pain.  She has not had vomiting here in the ED.  This is after IV fluid  resuscitation and antiemetics.  We discussed lab results which show an elevated white blood cell count.  With this, given her persistent pain, I have ordered CT scanning and discussed plan of care with the patient.  She is not having any UTI symptoms.  We discussed some minor findings in her urine of trace leukocyte esterase, moderate blood, protein but no elevated bacteria and no nitrites.  Given these findings and a positive he of symptoms, I would not treat her with antibiotics for UTI.  Patient is agreeable to this plan of care.  Chest x-ray was reassuring.    12:31 PM CT results show a cystic lesion in the left adnexa with some mounding inflammatory changes and recommendation for further evaluation with pelvic ultrasound which was ordered.  She has a persistent 11 mm hepatic lesion likely cystic that is unchanged over the last 2 years.  Patient updated on plan of care.    2:34 PM D/W Dr. Campos, gynecology on-call today, regarding patient's presentation CT and ultrasound findings today.  He is recommending the patient have a Ca1 25.  We do not need to know the results today but I will help in follow-up.  He will see the patient in close follow up for repeat ultrasound.  The patient, and family are updated on this consultation.  At this point, I feel she is safe for discharge to home.  She will likely need pain medication as well as Zofran which were provided.  She is well-appearing overall and improved.  She is discharged home in stable condition.    Hydration: Based on the patient's presentation of Acute Vomiting and Dehydration the patient was given IV fluids. IV Hydration was used because oral hydration was not adequate alone. Upon recheck following hydration, the patient was improved.     ADDITIONAL PROBLEMS MANAGED    DISPOSITION AND DISCUSSIONS  I have discussed management of the patient with the following physicians and MANI's:  Dr. Campos, Gyn.    Discussion of management with other [facility] or appropriate  source(s): None     Escalation of care considered, and ultimately not performed:acute inpatient care management, however at this time, the patient is most appropriate for outpatient management    Barriers to care at this time, including but not limited to: None.     Decision tools and prescription drugs considered including, but not limited to: None.    FINAL DIAGNOSIS  1. Generalized abdominal pain    2. Cyst of left ovary    3. Nausea and vomiting, unspecified vomiting type           Electronically signed by: Yahaira Jackson D.O., 6/28/2024 8:24 AM       (4) no impairment

## 2024-08-11 ENCOUNTER — OUTPATIENT (OUTPATIENT)
Dept: OUTPATIENT SERVICES | Facility: HOSPITAL | Age: 59
LOS: 1 days | End: 2024-08-11
Payer: MEDICARE

## 2024-08-11 DIAGNOSIS — Z90.13 ACQUIRED ABSENCE OF BILATERAL BREASTS AND NIPPLES: Chronic | ICD-10-CM

## 2024-08-11 DIAGNOSIS — Z00.8 ENCOUNTER FOR OTHER GENERAL EXAMINATION: ICD-10-CM

## 2024-08-11 DIAGNOSIS — Z33.2 ENCOUNTER FOR ELECTIVE TERMINATION OF PREGNANCY: Chronic | ICD-10-CM

## 2024-08-11 DIAGNOSIS — M12.9 ARTHROPATHY, UNSPECIFIED: Chronic | ICD-10-CM

## 2024-08-11 DIAGNOSIS — K74.60 UNSPECIFIED CIRRHOSIS OF LIVER: ICD-10-CM

## 2024-08-11 DIAGNOSIS — O00.90 UNSPECIFIED ECTOPIC PREGNANCY WITHOUT INTRAUTERINE PREGNANCY: Chronic | ICD-10-CM

## 2024-08-11 PROCEDURE — A9585: CPT

## 2024-08-11 PROCEDURE — 74183 MRI ABD W/O CNTR FLWD CNTR: CPT

## 2024-08-26 ENCOUNTER — NON-APPOINTMENT (OUTPATIENT)
Age: 59
End: 2024-08-26

## 2024-08-26 ENCOUNTER — APPOINTMENT (OUTPATIENT)
Dept: SURGERY | Facility: CLINIC | Age: 59
End: 2024-08-26
Payer: MEDICARE

## 2024-08-26 DIAGNOSIS — K76.9 LIVER DISEASE, UNSPECIFIED: ICD-10-CM

## 2024-08-26 DIAGNOSIS — K74.60 UNSPECIFIED CIRRHOSIS OF LIVER: ICD-10-CM

## 2024-08-26 DIAGNOSIS — Z86.19 PERSONAL HISTORY OF OTHER INFECTIOUS AND PARASITIC DISEASES: ICD-10-CM

## 2024-08-26 DIAGNOSIS — K76.89 OTHER SPECIFIED DISEASES OF LIVER: ICD-10-CM

## 2024-08-26 PROCEDURE — 99213K: CUSTOM

## 2024-10-14 ENCOUNTER — APPOINTMENT (OUTPATIENT)
Dept: OBGYN | Facility: CLINIC | Age: 59
End: 2024-10-14
Payer: MEDICARE

## 2024-10-14 ENCOUNTER — ASOB RESULT (OUTPATIENT)
Age: 59
End: 2024-10-14

## 2024-10-14 VITALS — WEIGHT: 139 LBS | SYSTOLIC BLOOD PRESSURE: 105 MMHG | BODY MASS INDEX: 25.9 KG/M2 | DIASTOLIC BLOOD PRESSURE: 73 MMHG

## 2024-10-14 DIAGNOSIS — Z79.810 LONG TERM (CURRENT) USE OF SELECTIVE ESTROGEN RECEPTOR MODULATORS (SERMS): ICD-10-CM

## 2024-10-14 DIAGNOSIS — R87.610 ATYPICAL SQUAMOUS CELLS OF UNDETERMINED SIGNIFICANCE ON CYTOLOGIC SMEAR OF CERVIX (ASC-US): ICD-10-CM

## 2024-10-14 DIAGNOSIS — Z01.419 ENCOUNTER FOR GYNECOLOGICAL EXAMINATION (GENERAL) (ROUTINE) W/OUT ABNORMAL FINDINGS: ICD-10-CM

## 2024-10-14 DIAGNOSIS — R87.810 ATYPICAL SQUAMOUS CELLS OF UNDETERMINED SIGNIFICANCE ON CYTOLOGIC SMEAR OF CERVIX (ASC-US): ICD-10-CM

## 2024-10-14 PROCEDURE — 76856 US EXAM PELVIC COMPLETE: CPT

## 2024-10-14 PROCEDURE — 99386 PREV VISIT NEW AGE 40-64: CPT | Mod: 25

## 2024-10-14 PROCEDURE — 76830 TRANSVAGINAL US NON-OB: CPT

## 2024-10-14 RX ORDER — DENOSUMAB 60 MG/ML
60 INJECTION SUBCUTANEOUS
Refills: 0 | Status: ACTIVE | COMMUNITY

## 2024-10-15 LAB — HPV HIGH+LOW RISK DNA PNL CVX: NOT DETECTED

## 2024-10-18 LAB — CYTOLOGY CVX/VAG DOC THIN PREP: NORMAL

## 2024-11-13 ENCOUNTER — OUTPATIENT (OUTPATIENT)
Dept: OUTPATIENT SERVICES | Facility: HOSPITAL | Age: 59
LOS: 1 days | Discharge: HOME | End: 2024-11-13

## 2024-11-13 DIAGNOSIS — M12.9 ARTHROPATHY, UNSPECIFIED: Chronic | ICD-10-CM

## 2024-11-13 DIAGNOSIS — Z33.2 ENCOUNTER FOR ELECTIVE TERMINATION OF PREGNANCY: Chronic | ICD-10-CM

## 2024-11-13 DIAGNOSIS — Z90.13 ACQUIRED ABSENCE OF BILATERAL BREASTS AND NIPPLES: Chronic | ICD-10-CM

## 2024-11-13 DIAGNOSIS — O00.90 UNSPECIFIED ECTOPIC PREGNANCY WITHOUT INTRAUTERINE PREGNANCY: Chronic | ICD-10-CM

## 2024-11-13 DIAGNOSIS — C50.919 MALIGNANT NEOPLASM OF UNSPECIFIED SITE OF UNSPECIFIED FEMALE BREAST: ICD-10-CM

## 2024-11-18 ENCOUNTER — APPOINTMENT (OUTPATIENT)
Dept: HEPATOLOGY | Facility: CLINIC | Age: 59
End: 2024-11-18

## 2024-11-20 ENCOUNTER — RESULT REVIEW (OUTPATIENT)
Age: 59
End: 2024-11-20

## 2024-11-20 ENCOUNTER — APPOINTMENT (OUTPATIENT)
Dept: HEMATOLOGY ONCOLOGY | Facility: CLINIC | Age: 59
End: 2024-11-20
Payer: MEDICARE

## 2024-11-20 ENCOUNTER — APPOINTMENT (OUTPATIENT)
Dept: INFUSION THERAPY | Facility: HOSPITAL | Age: 59
End: 2024-11-20

## 2024-11-20 VITALS
OXYGEN SATURATION: 98 % | SYSTOLIC BLOOD PRESSURE: 128 MMHG | TEMPERATURE: 97.2 F | DIASTOLIC BLOOD PRESSURE: 85 MMHG | BODY MASS INDEX: 26.71 KG/M2 | HEART RATE: 72 BPM | WEIGHT: 143.3 LBS | RESPIRATION RATE: 16 BRPM

## 2024-11-20 DIAGNOSIS — Z79.810 LONG TERM (CURRENT) USE OF SELECTIVE ESTROGEN RECEPTOR MODULATORS (SERMS): ICD-10-CM

## 2024-11-20 DIAGNOSIS — C50.919 MALIGNANT NEOPLASM OF UNSPECIFIED SITE OF UNSPECIFIED FEMALE BREAST: ICD-10-CM

## 2024-11-20 DIAGNOSIS — R23.2 FLUSHING: ICD-10-CM

## 2024-11-20 LAB
BASOPHILS # BLD AUTO: 0.06 K/UL — SIGNIFICANT CHANGE UP (ref 0–0.2)
BASOPHILS NFR BLD AUTO: 0.9 % — SIGNIFICANT CHANGE UP (ref 0–2)
EOSINOPHIL # BLD AUTO: 0.1 K/UL — SIGNIFICANT CHANGE UP (ref 0–0.5)
EOSINOPHIL NFR BLD AUTO: 1.6 % — SIGNIFICANT CHANGE UP (ref 0–6)
HCT VFR BLD CALC: 42.1 % — SIGNIFICANT CHANGE UP (ref 34.5–45)
HGB BLD-MCNC: 13.8 G/DL — SIGNIFICANT CHANGE UP (ref 11.5–15.5)
IMM GRANULOCYTES NFR BLD AUTO: 0.2 % — SIGNIFICANT CHANGE UP (ref 0–0.9)
LYMPHOCYTES # BLD AUTO: 2.09 K/UL — SIGNIFICANT CHANGE UP (ref 1–3.3)
LYMPHOCYTES # BLD AUTO: 32.7 % — SIGNIFICANT CHANGE UP (ref 13–44)
MCHC RBC-ENTMCNC: 30.7 PG — SIGNIFICANT CHANGE UP (ref 27–34)
MCHC RBC-ENTMCNC: 32.8 G/DL — SIGNIFICANT CHANGE UP (ref 32–36)
MCV RBC AUTO: 93.6 FL — SIGNIFICANT CHANGE UP (ref 80–100)
MONOCYTES # BLD AUTO: 0.6 K/UL — SIGNIFICANT CHANGE UP (ref 0–0.9)
MONOCYTES NFR BLD AUTO: 9.4 % — SIGNIFICANT CHANGE UP (ref 2–14)
NEUTROPHILS # BLD AUTO: 3.54 K/UL — SIGNIFICANT CHANGE UP (ref 1.8–7.4)
NEUTROPHILS NFR BLD AUTO: 55.2 % — SIGNIFICANT CHANGE UP (ref 43–77)
NRBC # BLD: 0 /100 WBCS — SIGNIFICANT CHANGE UP (ref 0–0)
NRBC BLD-RTO: 0 /100 WBCS — SIGNIFICANT CHANGE UP (ref 0–0)
PLATELET # BLD AUTO: 167 K/UL — SIGNIFICANT CHANGE UP (ref 150–400)
RBC # BLD: 4.5 M/UL — SIGNIFICANT CHANGE UP (ref 3.8–5.2)
RBC # FLD: 13.1 % — SIGNIFICANT CHANGE UP (ref 10.3–14.5)
WBC # BLD: 6.4 K/UL — SIGNIFICANT CHANGE UP (ref 3.8–10.5)
WBC # FLD AUTO: 6.4 K/UL — SIGNIFICANT CHANGE UP (ref 3.8–10.5)

## 2024-11-20 PROCEDURE — G2211 COMPLEX E/M VISIT ADD ON: CPT

## 2024-11-20 PROCEDURE — 99214 OFFICE O/P EST MOD 30 MIN: CPT

## 2024-11-21 ENCOUNTER — APPOINTMENT (OUTPATIENT)
Dept: HEPATOLOGY | Facility: CLINIC | Age: 59
End: 2024-11-21

## 2024-11-21 VITALS
RESPIRATION RATE: 16 BRPM | SYSTOLIC BLOOD PRESSURE: 118 MMHG | TEMPERATURE: 98.1 F | WEIGHT: 142 LBS | BODY MASS INDEX: 26.13 KG/M2 | HEART RATE: 82 BPM | OXYGEN SATURATION: 96 % | HEIGHT: 62 IN | DIASTOLIC BLOOD PRESSURE: 77 MMHG

## 2024-11-21 DIAGNOSIS — C79.51 SECONDARY MALIGNANT NEOPLASM OF BONE: ICD-10-CM

## 2024-11-21 DIAGNOSIS — K74.60 UNSPECIFIED CIRRHOSIS OF LIVER: ICD-10-CM

## 2024-11-21 PROCEDURE — 99214 OFFICE O/P EST MOD 30 MIN: CPT

## 2024-11-26 LAB
ACARBOXYPROTHROMBIN SERPL-MCNC: 0.2 NG/ML
AFP-TM SERPL-MCNC: <1.8 NG/ML
ALBUMIN SERPL ELPH-MCNC: 4.4 G/DL
ALP BLD-CCNC: 40 U/L
ALT SERPL-CCNC: 16 U/L
ANION GAP SERPL CALC-SCNC: 13 MMOL/L
AST SERPL-CCNC: 20 U/L
BILIRUB SERPL-MCNC: 0.5 MG/DL
BUN SERPL-MCNC: 14 MG/DL
CALCIUM SERPL-MCNC: 10.5 MG/DL
CANCER AG19-9 SERPL-ACNC: 53 U/ML
CANCER AG27-29 SERPL-ACNC: 16.2 U/ML
CEA SERPL-MCNC: 1.9 NG/ML
CHLORIDE SERPL-SCNC: 102 MMOL/L
CO2 SERPL-SCNC: 26 MMOL/L
CREAT SERPL-MCNC: 0.72 MG/DL
EGFR: 97 ML/MIN/1.73M2
GLUCOSE SERPL-MCNC: 91 MG/DL
HBV CORE IGG+IGM SER QL: NONREACTIVE
HCV GENTYP BLD NAA+PROBE: NOT DETECTED
INR PPP: 0.97 RATIO
POTASSIUM SERPL-SCNC: 4.2 MMOL/L
PROT SERPL-MCNC: 7 G/DL
PT BLD: 11.5 SEC
SODIUM SERPL-SCNC: 141 MMOL/L
T3FREE SERPL-MCNC: 2.99 PG/ML
T4 FREE SERPL-MCNC: 1.2 NG/DL
TSH SERPL-ACNC: 3.48 UIU/ML

## 2025-01-21 ENCOUNTER — ASOB RESULT (OUTPATIENT)
Age: 60
End: 2025-01-21

## 2025-01-21 ENCOUNTER — APPOINTMENT (OUTPATIENT)
Dept: ANTEPARTUM | Facility: CLINIC | Age: 60
End: 2025-01-21
Payer: MEDICARE

## 2025-01-21 ENCOUNTER — NON-APPOINTMENT (OUTPATIENT)
Age: 60
End: 2025-01-21

## 2025-01-21 ENCOUNTER — APPOINTMENT (OUTPATIENT)
Dept: OBGYN | Facility: CLINIC | Age: 60
End: 2025-01-21
Payer: MEDICARE

## 2025-01-21 ENCOUNTER — APPOINTMENT (OUTPATIENT)
Dept: OPHTHALMOLOGY | Facility: CLINIC | Age: 60
End: 2025-01-21
Payer: MEDICARE

## 2025-01-21 VITALS
BODY MASS INDEX: 26.68 KG/M2 | WEIGHT: 145 LBS | SYSTOLIC BLOOD PRESSURE: 125 MMHG | DIASTOLIC BLOOD PRESSURE: 83 MMHG | HEIGHT: 62 IN

## 2025-01-21 DIAGNOSIS — Z79.810 LONG TERM (CURRENT) USE OF SELECTIVE ESTROGEN RECEPTOR MODULATORS (SERMS): ICD-10-CM

## 2025-01-21 DIAGNOSIS — R93.89 ABNORMAL FINDINGS ON DIAGNOSTIC IMAGING OF OTHER SPECIFIED BODY STRUCTURES: ICD-10-CM

## 2025-01-21 PROCEDURE — 76830 TRANSVAGINAL US NON-OB: CPT

## 2025-01-21 PROCEDURE — 76857 US EXAM PELVIC LIMITED: CPT

## 2025-01-21 PROCEDURE — 99213 OFFICE O/P EST LOW 20 MIN: CPT

## 2025-01-21 PROCEDURE — 92004 COMPRE OPH EXAM NEW PT 1/>: CPT

## 2025-02-07 ENCOUNTER — OUTPATIENT (OUTPATIENT)
Dept: OUTPATIENT SERVICES | Facility: HOSPITAL | Age: 60
LOS: 1 days | End: 2025-02-07
Payer: MEDICARE

## 2025-02-07 ENCOUNTER — APPOINTMENT (OUTPATIENT)
Dept: ULTRASOUND IMAGING | Facility: IMAGING CENTER | Age: 60
End: 2025-02-07
Payer: MEDICARE

## 2025-02-07 DIAGNOSIS — Z90.13 ACQUIRED ABSENCE OF BILATERAL BREASTS AND NIPPLES: Chronic | ICD-10-CM

## 2025-02-07 DIAGNOSIS — Z33.2 ENCOUNTER FOR ELECTIVE TERMINATION OF PREGNANCY: Chronic | ICD-10-CM

## 2025-02-07 DIAGNOSIS — O00.90 UNSPECIFIED ECTOPIC PREGNANCY WITHOUT INTRAUTERINE PREGNANCY: Chronic | ICD-10-CM

## 2025-02-07 DIAGNOSIS — M12.9 ARTHROPATHY, UNSPECIFIED: Chronic | ICD-10-CM

## 2025-02-07 PROCEDURE — 76700 US EXAM ABDOM COMPLETE: CPT

## 2025-02-07 PROCEDURE — 76700 US EXAM ABDOM COMPLETE: CPT | Mod: 26

## 2025-02-11 DIAGNOSIS — K76.9 LIVER DISEASE, UNSPECIFIED: ICD-10-CM

## 2025-02-11 DIAGNOSIS — K76.89 OTHER SPECIFIED DISEASES OF LIVER: ICD-10-CM

## 2025-02-11 DIAGNOSIS — Z86.19 PERSONAL HISTORY OF OTHER INFECTIOUS AND PARASITIC DISEASES: ICD-10-CM

## 2025-05-15 ENCOUNTER — OUTPATIENT (OUTPATIENT)
Dept: OUTPATIENT SERVICES | Facility: HOSPITAL | Age: 60
LOS: 1 days | Discharge: HOME | End: 2025-05-15

## 2025-05-15 DIAGNOSIS — Z33.2 ENCOUNTER FOR ELECTIVE TERMINATION OF PREGNANCY: Chronic | ICD-10-CM

## 2025-05-15 DIAGNOSIS — M12.9 ARTHROPATHY, UNSPECIFIED: Chronic | ICD-10-CM

## 2025-05-15 DIAGNOSIS — Z90.13 ACQUIRED ABSENCE OF BILATERAL BREASTS AND NIPPLES: Chronic | ICD-10-CM

## 2025-05-15 DIAGNOSIS — O00.90 UNSPECIFIED ECTOPIC PREGNANCY WITHOUT INTRAUTERINE PREGNANCY: Chronic | ICD-10-CM

## 2025-05-15 DIAGNOSIS — C50.919 MALIGNANT NEOPLASM OF UNSPECIFIED SITE OF UNSPECIFIED FEMALE BREAST: ICD-10-CM

## 2025-05-21 ENCOUNTER — APPOINTMENT (OUTPATIENT)
Dept: HEMATOLOGY ONCOLOGY | Facility: CLINIC | Age: 60
End: 2025-05-21
Payer: MEDICARE

## 2025-05-21 ENCOUNTER — APPOINTMENT (OUTPATIENT)
Dept: INFUSION THERAPY | Facility: HOSPITAL | Age: 60
End: 2025-05-21

## 2025-05-21 VITALS
DIASTOLIC BLOOD PRESSURE: 72 MMHG | RESPIRATION RATE: 16 BRPM | OXYGEN SATURATION: 97 % | WEIGHT: 140.85 LBS | HEART RATE: 69 BPM | SYSTOLIC BLOOD PRESSURE: 109 MMHG | BODY MASS INDEX: 25.77 KG/M2 | TEMPERATURE: 98.3 F

## 2025-05-21 DIAGNOSIS — Z79.810 LONG TERM (CURRENT) USE OF SELECTIVE ESTROGEN RECEPTOR MODULATORS (SERMS): ICD-10-CM

## 2025-05-21 DIAGNOSIS — C50.919 MALIGNANT NEOPLASM OF UNSPECIFIED SITE OF UNSPECIFIED FEMALE BREAST: ICD-10-CM

## 2025-05-21 PROCEDURE — G2211 COMPLEX E/M VISIT ADD ON: CPT

## 2025-05-21 PROCEDURE — 99214 OFFICE O/P EST MOD 30 MIN: CPT

## 2025-05-22 ENCOUNTER — APPOINTMENT (OUTPATIENT)
Dept: HEPATOLOGY | Facility: CLINIC | Age: 60
End: 2025-05-22
Payer: MEDICARE

## 2025-05-22 VITALS
RESPIRATION RATE: 16 BRPM | SYSTOLIC BLOOD PRESSURE: 113 MMHG | HEIGHT: 62 IN | DIASTOLIC BLOOD PRESSURE: 80 MMHG | OXYGEN SATURATION: 94 % | TEMPERATURE: 98.3 F | WEIGHT: 140 LBS | HEART RATE: 67 BPM | BODY MASS INDEX: 25.76 KG/M2

## 2025-05-22 DIAGNOSIS — K74.60 UNSPECIFIED CIRRHOSIS OF LIVER: ICD-10-CM

## 2025-05-22 DIAGNOSIS — K76.9 LIVER DISEASE, UNSPECIFIED: ICD-10-CM

## 2025-05-22 PROCEDURE — 99214 OFFICE O/P EST MOD 30 MIN: CPT

## 2025-05-27 ENCOUNTER — NON-APPOINTMENT (OUTPATIENT)
Age: 60
End: 2025-05-27

## 2025-05-27 LAB
ESTIMATED AVERAGE GLUCOSE: 105 MG/DL
HBA1C MFR BLD HPLC: 5.3 %

## 2025-07-21 ENCOUNTER — APPOINTMENT (OUTPATIENT)
Dept: ANTEPARTUM | Facility: CLINIC | Age: 60
End: 2025-07-21
Payer: MEDICARE

## 2025-07-21 ENCOUNTER — APPOINTMENT (OUTPATIENT)
Dept: OBGYN | Facility: CLINIC | Age: 60
End: 2025-07-21
Payer: MEDICARE

## 2025-07-21 ENCOUNTER — ASOB RESULT (OUTPATIENT)
Age: 60
End: 2025-07-21

## 2025-07-21 VITALS
BODY MASS INDEX: 25.95 KG/M2 | WEIGHT: 141 LBS | SYSTOLIC BLOOD PRESSURE: 111 MMHG | HEIGHT: 62 IN | DIASTOLIC BLOOD PRESSURE: 78 MMHG

## 2025-07-21 DIAGNOSIS — R93.89 ABNORMAL FINDINGS ON DIAGNOSTIC IMAGING OF OTHER SPECIFIED BODY STRUCTURES: ICD-10-CM

## 2025-07-21 DIAGNOSIS — Z79.810 LONG TERM (CURRENT) USE OF SELECTIVE ESTROGEN RECEPTOR MODULATORS (SERMS): ICD-10-CM

## 2025-07-21 PROCEDURE — 76857 US EXAM PELVIC LIMITED: CPT

## 2025-07-21 PROCEDURE — 76830 TRANSVAGINAL US NON-OB: CPT

## 2025-07-21 PROCEDURE — 99213 OFFICE O/P EST LOW 20 MIN: CPT

## 2025-09-16 ENCOUNTER — NON-APPOINTMENT (OUTPATIENT)
Age: 60
End: 2025-09-16

## 2025-09-17 ENCOUNTER — APPOINTMENT (OUTPATIENT)
Dept: MRI IMAGING | Facility: IMAGING CENTER | Age: 60
End: 2025-09-17

## 2025-09-17 ENCOUNTER — APPOINTMENT (OUTPATIENT)
Dept: SURGERY | Facility: CLINIC | Age: 60
End: 2025-09-17
Payer: MEDICARE

## 2025-09-17 PROCEDURE — 99213K: CUSTOM

## (undated) DEVICE — CATH IV SAFE BC 22G X 1" (BLUE)

## (undated) DEVICE — PACK IV START WITH CHG

## (undated) DEVICE — BITE BLOCK ADULT 20 X 27MM (GREEN)

## (undated) DEVICE — SENSOR O2 FINGER ADULT

## (undated) DEVICE — FOLEY HOLDER STATLOCK 2 WAY ADULT

## (undated) DEVICE — TUBING IV SET GRAVITY 3Y 100" MACRO

## (undated) DEVICE — BALLOON US ENDO

## (undated) DEVICE — SOL INJ NS 0.9% 500ML 2 PORT

## (undated) DEVICE — SYR ALLIANCE II INFLATION 60ML

## (undated) DEVICE — CATH IV SAFE BC 20G X 1.16" (PINK)

## (undated) DEVICE — TUBING SUCTION CONN 6FT STERILE

## (undated) DEVICE — TUBING SUCTION 20FT

## (undated) DEVICE — SUCTION YANKAUER NO CONTROL VENT